# Patient Record
Sex: MALE | Race: BLACK OR AFRICAN AMERICAN | Employment: OTHER | URBAN - METROPOLITAN AREA
[De-identification: names, ages, dates, MRNs, and addresses within clinical notes are randomized per-mention and may not be internally consistent; named-entity substitution may affect disease eponyms.]

---

## 2017-02-01 DIAGNOSIS — R29.898 WEAKNESS OF BOTH LEGS: ICD-10-CM

## 2017-02-01 DIAGNOSIS — G89.29 OTHER CHRONIC PAIN: ICD-10-CM

## 2017-02-01 RX ORDER — HYDROCODONE BITARTRATE AND ACETAMINOPHEN 10; 325 MG/1; MG/1
1 TABLET ORAL
Qty: 90 TAB | Refills: 0 | Status: SHIPPED | OUTPATIENT
Start: 2017-02-01 | End: 2017-02-06 | Stop reason: SDUPTHER

## 2017-02-01 NOTE — TELEPHONE ENCOUNTER
Last Visit: 12/13/2016 with MD Cody Escudero    Next Appointment: 03/14/2017 with MD Cody Escudero   Previous Refill Encounters: 12/13/2016 per MD Cody Escudero #[phone]     Requested Prescriptions     Pending Prescriptions Disp Refills    HYDROcodone-acetaminophen (NORCO)  mg tablet 90 Tab 0     Sig: Take 1 Tab by mouth three (3) times daily as needed for Pain. Max Daily Amount: 3 Tabs.  Take for chronic pain

## 2017-02-06 RX ORDER — HYDROCODONE BITARTRATE AND ACETAMINOPHEN 10; 325 MG/1; MG/1
1 TABLET ORAL
Qty: 90 TAB | Refills: 0 | Status: SHIPPED | OUTPATIENT
Start: 2017-02-06 | End: 2017-03-14 | Stop reason: SDUPTHER

## 2017-02-06 NOTE — TELEPHONE ENCOUNTER
Rx for Norco 10/325 mg # 90 i tid prn pain signed and patient was notified ready for pickup at [facility]

## 2017-03-14 ENCOUNTER — OFFICE VISIT (OUTPATIENT)
Dept: ORTHOPEDIC SURGERY | Age: 72
End: 2017-03-14

## 2017-03-14 VITALS
WEIGHT: 223 LBS | SYSTOLIC BLOOD PRESSURE: 127 MMHG | TEMPERATURE: 98 F | HEART RATE: 76 BPM | RESPIRATION RATE: 18 BRPM | OXYGEN SATURATION: 96 % | BODY MASS INDEX: 33.8 KG/M2 | HEIGHT: 68 IN | DIASTOLIC BLOOD PRESSURE: 71 MMHG

## 2017-03-14 DIAGNOSIS — M48.061 LUMBAR SPINAL STENOSIS: ICD-10-CM

## 2017-03-14 DIAGNOSIS — R29.898 WEAKNESS OF BOTH LEGS: ICD-10-CM

## 2017-03-14 DIAGNOSIS — G89.29 OTHER CHRONIC PAIN: ICD-10-CM

## 2017-03-14 DIAGNOSIS — M47.816 LUMBAR FACET ARTHROPATHY: ICD-10-CM

## 2017-03-14 DIAGNOSIS — H54.8 LEGALLY BLIND: ICD-10-CM

## 2017-03-14 DIAGNOSIS — Z51.81 THERAPEUTIC DRUG MONITORING: ICD-10-CM

## 2017-03-14 DIAGNOSIS — K59.00 CONSTIPATION, UNSPECIFIED CONSTIPATION TYPE: ICD-10-CM

## 2017-03-14 DIAGNOSIS — G89.29 OTHER CHRONIC PAIN: Primary | ICD-10-CM

## 2017-03-14 RX ORDER — HYDROCODONE BITARTRATE AND ACETAMINOPHEN 10; 325 MG/1; MG/1
TABLET ORAL
Qty: 90 TAB | Refills: 0 | Status: SHIPPED | OUTPATIENT
Start: 2017-05-12 | End: 2017-06-13 | Stop reason: SDUPTHER

## 2017-03-14 RX ORDER — FLUTICASONE PROPIONATE 50 MCG
2 SPRAY, SUSPENSION (ML) NASAL
COMMUNITY

## 2017-03-14 RX ORDER — HYDROCODONE BITARTRATE AND ACETAMINOPHEN 10; 325 MG/1; MG/1
1 TABLET ORAL
Qty: 90 TAB | Refills: 0 | Status: SHIPPED | OUTPATIENT
Start: 2017-03-14 | End: 2017-06-13 | Stop reason: SDUPTHER

## 2017-03-14 RX ORDER — POLYETHYLENE GLYCOL 3350 17 G/17G
17 POWDER, FOR SOLUTION ORAL
Qty: 510 G | Refills: 5 | Status: SHIPPED | OUTPATIENT
Start: 2017-03-14 | End: 2018-03-12 | Stop reason: SDUPTHER

## 2017-03-14 RX ORDER — HYDROCODONE BITARTRATE AND ACETAMINOPHEN 10; 325 MG/1; MG/1
TABLET ORAL
Qty: 90 TAB | Refills: 0 | Status: SHIPPED | OUTPATIENT
Start: 2017-04-13 | End: 2017-06-13 | Stop reason: SDUPTHER

## 2017-03-14 NOTE — PATIENT INSTRUCTIONS
Low Back Arthritis: Exercises  Your Care Instructions  Here are some examples of typical rehabilitation exercises for your condition. Start each exercise slowly. Ease off the exercise if you start to have pain. Your doctor or physical therapist will tell you when you can start these exercises and which ones will work best for you. When you are not being active, find a comfortable position for rest. Some people are comfortable on the floor or a medium-firm bed with a small pillow under their head and another under their knees. Some people prefer to lie on their side with a pillow between their knees. Don't stay in one position for too long. Take short walks (10 to 20 minutes) every 2 to 3 hours. Avoid slopes, hills, and stairs until you feel better. Walk only distances you can manage without pain, especially leg pain. How to do the exercises  Pelvic tilt    1. Lie on your back with your knees bent. 2. \"Brace\" your stomachtighten your muscles by pulling in and imagining your belly button moving toward your spine. 3. Press your lower back into the floor. You should feel your hips and pelvis rock back. 4. Hold for 6 seconds while breathing smoothly. 5. Relax and allow your pelvis and hips to rock forward. 6. Repeat 8 to 12 times. Back stretches    1. Get down on your hands and knees on the floor. 2. Relax your head and allow it to droop. Round your back up toward the ceiling until you feel a nice stretch in your upper, middle, and lower back. Hold this stretch for as long as it feels comfortable, or about 15 to 30 seconds. 3. Return to the starting position with a flat back while you are on your hands and knees. 4. Let your back sway by pressing your stomach toward the floor. Lift your buttocks toward the ceiling. 5. Hold this position for 15 to 30 seconds. 6. Repeat 2 to 4 times. Follow-up care is a key part of your treatment and safety.  Be sure to make and go to all appointments, and call your doctor if you are having problems. It's also a good idea to know your test results and keep a list of the medicines you take. Where can you learn more? Go to http://slava-todd.info/. Enter W158 in the search box to learn more about \"Low Back Arthritis: Exercises. \"  Current as of: May 23, 2016  Content Version: 11.1  © 8699-5010 [employer]. Care instructions adapted under license by [employer] (which disclaims liability or warranty for this information). If you have questions about a medical condition or this instruction, always ask your healthcare professional. [place] [address] any warranty or liability for your use of this information.

## 2017-03-14 NOTE — MR AVS SNAPSHOT
Visit Information Date & Time Provider Department Dept. Phone Encounter #  
 3/14/2017 11:30 AM Choco Kidd, [address] [facility] [phone] Follow-up Instructions Return in about 3 months (around 6/14/2017). Routing History Upcoming Health Maintenance Date Due DTaP/Tdap/Td series (1 - Tdap) 11/7/1964 FOBT Q 1 YEAR AGE 50-75 11/7/1993 ZOSTER VACCINE AGE 60> 11/7/2003 GLAUCOMA SCREENING Q2Y 11/7/2008 MEDICARE YEARLY EXAM 11/7/2008 INFLUENZA AGE 9 TO ADULT 8/1/2016 Pneumococcal 65+ High/Highest Risk (2 of 2 - PPSV23) 1/27/2017 Allergies as of 3/14/2017  Review Complete On: 3/14/2017 By: Vandana Cooney LPN Severity Noted Reaction Type Reactions Contrast Agent [Iodine]  01/01/1995    Hives Current Immunizations  Never Reviewed No immunizations on file. Not reviewed this visit You Were Diagnosed With   
  
 Codes Comments Other chronic pain    -  Primary ICD-10-CM: G89.29 ICD-9-CM: 338.29 Therapeutic drug monitoring     ICD-10-CM: Z51.81 
ICD-9-CM: V58.83 Weakness of both legs     ICD-10-CM: M62.81 ICD-9-CM: 729.89 Lumbar spinal stenosis     ICD-10-CM: M48.06 
ICD-9-CM: 724.02 Lumbar facet arthropathy (HCC)     ICD-10-CM: M12.88 ICD-9-CM: 721.3 Legally blind     ICD-10-CM: H54.8 ICD-9-CM: 369.4 Constipation, unspecified constipation type     ICD-10-CM: K59.00 ICD-9-CM: 564.00 Vitals BP Pulse Temp Resp Height(growth percentile) Weight(growth percentile) 127/71 76 98 °F (36.7 °C) (Oral) 18 5' 8\" (1.727 m) 223 lb (101.2 kg) SpO2 BMI Smoking Status 96% 33.91 kg/m2 Never Smoker BMI and BSA Data Body Mass Index Body Surface Area  
 33.91 kg/m 2 2.2 m 2 Preferred Pharmacy Pharmacy Name Phone [address], [facility] [place] [phone] Your Updated Medication List  
  
   
This list is accurate as of: 3/14/17 12:27 PM.  Always use your most recent med list.  
  
  
  
  
 acyclovir 400 mg tablet Commonly known as:  ZOVIRAX ADALAT CC PO Take 60 mg by mouth daily. ALLOPURINOL PO Take 300 mg by mouth daily. aspirin delayed-release 81 mg tablet Take  by mouth daily. CELEXA PO Take 10 mg by mouth daily. DULoxetine 30 mg capsule Commonly known as:  CYMBALTA FLONASE 50 mcg/actuation nasal spray Generic drug:  fluticasone 2 Sprays by Both Nostrils route daily. * HYDROcodone-acetaminophen  mg tablet Commonly known as:  Ericka Willis Take 1 Tab by mouth three (3) times daily as needed for Pain. Max Daily Amount: 3 Tabs. Take for chronic pain  
  
 * HYDROcodone-acetaminophen  mg tablet Commonly known as:  Ericka Willis Take 1 TID as needed for chronic pain  Indications: Pain Start taking on:  4/13/2017  
  
 * HYDROcodone-acetaminophen  mg tablet Commonly known as:  Ericka Willis 1 po TID as needed for chronic pain  Indications: Pain Start taking on:  5/12/2017  
  
 oxyCODONE-acetaminophen 7.5-325 mg per tablet Commonly known as:  PERCOCET 7.5  
1 po q pm prn severe pain  
  
 polyethylene glycol 17 gram/dose powder Commonly known as:  [place] Armor Take 17 g by mouth daily as needed. PROAIR HFA 90 mcg/actuation inhaler Generic drug:  albuterol SPIRONOLACTONE PO Take 25 mg by mouth two (2) times a day. TOPAMAX PO Take 100 mg by mouth two (2) times a day. Indications: TREMORS * Notice: This list has 3 medication(s) that are the same as other medications prescribed for you. Read the directions carefully, and ask your doctor or other care provider to review them with you. Prescriptions Printed Refills HYDROcodone-acetaminophen (NORCO)  mg tablet 0 Sig: Take 1 Tab by mouth three (3) times daily as needed for Pain.  Max Daily Amount: 3 Tabs. Take for chronic pain  
 Class: Print Route: Oral  
 HYDROcodone-acetaminophen (NORCO)  mg tablet 0 Starting on: 2017 Si po TID as needed for chronic pain  Indications: Pain Class: Print HYDROcodone-acetaminophen (NORCO)  mg tablet 0 Starting on: 2017 Sig: Take 1 TID as needed for chronic pain  Indications: Pain Class: Print Prescriptions Sent to Pharmacy Refills  
 polyethylene glycol (MIRALAX) 17 gram/dose powder 5 Sig: Take 17 g by mouth daily as needed. Class: Normal  
 Pharmacy: [facility] [address] [place] [address] #: [phone] Route: Oral  
  
Follow-up Instructions Return in about 3 months (around 2017). To-Do List   
 2017 Lab:  DRUG SCREEN UR - W/ CONFIRM Patient Instructions Low Back Arthritis: Exercises Your Care Instructions Here are some examples of typical rehabilitation exercises for your condition. Start each exercise slowly. Ease off the exercise if you start to have pain. Your doctor or physical therapist will tell you when you can start these exercises and which ones will work best for you. When you are not being active, find a comfortable position for rest. Some people are comfortable on the floor or a medium-firm bed with a small pillow under their head and another under their knees. Some people prefer to lie on their side with a pillow between their knees. Don't stay in one position for too long. Take short walks (10 to 20 minutes) every 2 to 3 hours. Avoid slopes, hills, and stairs until you feel better. Walk only distances you can manage without pain, especially leg pain. How to do the exercises Pelvic tilt 1. Lie on your back with your knees bent. 2. \"Brace\" your stomachtighten your muscles by pulling in and imagining your belly button moving toward your spine. 3. Press your lower back into the floor. You should feel your hips and pelvis rock back. 4. Hold for 6 seconds while breathing smoothly. 5. Relax and allow your pelvis and hips to rock forward. 6. Repeat 8 to 12 times. Back stretches 1. Get down on your hands and knees on the floor. 2. Relax your head and allow it to droop. Round your back up toward the ceiling until you feel a nice stretch in your upper, middle, and lower back. Hold this stretch for as long as it feels comfortable, or about 15 to 30 seconds. 3. Return to the starting position with a flat back while you are on your hands and knees. 4. Let your back sway by pressing your stomach toward the floor. Lift your buttocks toward the ceiling. 5. Hold this position for 15 to 30 seconds. 6. Repeat 2 to 4 times. Follow-up care is a key part of your treatment and safety. Be sure to make and go to all appointments, and call your doctor if you are having problems. It's also a good idea to know your test results and keep a list of the medicines you take. Where can you learn more? Go to http://slava-todd.info/. Enter C562 in the search box to learn more about \"Low Back Arthritis: Exercises. \" Current as of: May 23, 2016 Content Version: 11.1 © 4578-3013 [employer]. Care instructions adapted under license by [employer] (which disclaims liability or warranty for this information). If you have questions about a medical condition or this instruction, always ask your healthcare professional. [place] [address] any warranty or liability for your use of this information. Introducing [facility]! Juan Manuel Hillman introduces iSites patient portal. Now you can access parts of your medical record, email your doctor's office, and request medication refills online. 1. In your internet browser, go to [email or web address] 2. Click on the First Time User? Click Here link in the Sign In box. You will see the New Member Sign Up page. 3. Enter your [employer] Access Code exactly as it appears below. You will not need to use this code after youve completed the sign-up process. If you do not sign up before the expiration date, you must request a new code. · [employer] Access Code: [email or web address] Expires: 6/12/2017 11:31 AM 
 
4. Enter the last four digits of your Social Security Number (xxxx) and Date of Birth (mm/dd/yyyy) as indicated and click Submit. You will be taken to the next sign-up page. 5. Create a Fashion To Figure ID. This will be your [employer] login ID and cannot be changed, so think of one that is secure and easy to remember. 6. Create a [employer] password. You can change your password at any time. 7. Enter your Password Reset Question and Answer. This can be used at a later time if you forget your password. 8. Enter your e-mail address. You will receive e-mail notification when new information is available in [address] 9. Click Sign Up. You can now view and download portions of your medical record. 10. Click the Download Summary menu link to download a portable copy of your medical information. If you have questions, please visit the Frequently Asked Questions section of the Fashion To [employer] website. Remember, Fashion To Figure is NOT to be used for urgent needs. For medical emergencies, dial 911. Now available from your iPhone and Android! Please provide this summary of care documentation to your next provider. Your primary care clinician is listed as Mendel Sprain. If you have any questions after today's visit, please call [phone].

## 2017-03-14 NOTE — PROGRESS NOTES
[facility]  [address] [place], [address]  Phone: [phone]  Fax: [phone]          HISTORY OF PRESENT ILLNESS:  Charlette Caldwell is a 66 y.o. male with history of chronic lumbar pain. Pt reports 7/10 lower back pain today. He states that he ran out of Norco  mg since his last office visit. Pt generally takes the Norco  mg as his pain warrants with benefit. At one point he had discontinued the medication because of the constipation but has gotten significant relief with Miralax. He requests a refill of Miralax at this time. He also recently had his service connection adjusted at the [place]; he reports it is 60% now and he will be receiving an increase in his monthly benefits. He thought he had 70% service connection and he will contact his [place] representative about this. If he has further questions, he will follow up with Dr. Koffi Gastelum at the [place]; his next scheduled appt is in August of 2017. Pt at this time desires to continue with current care. Of note, his wife is having significant health issues which are currently pulmonary and she is no longer able to drive him to his appts. She is currently undergoing testing and is on oxygen.      Pain Scale: 7/10    PCP: Randal Blanc MD       Past Medical History:   Diagnosis Date    Allergy     Anxiety     ED (erectile dysfunction)     Elevated prostate specific antigen (PSA)     Essential hypertension     Essential tremor     Gout     Hypotestosteronism     Ill-defined condition     ESSENTIAL TREMORS    Nocturia     Nodular prostate without urinary obstruction     Panic attack     Prostate cancer ([facility] [address] 2/14/13    PNBx-T2a - Prostatic Volume: 26.7 grams - [place] 6 (3 + 3) -  Dr. Simona Sanchez Thyroid disease     small tumor        Social History     Social History    Marital status:      Spouse name: N/A    Number of children: N/A    Years of education: N/A     Occupational History    Not on file. Social History Main Topics    Smoking status: Never Smoker    Smokeless tobacco: Never Used    Alcohol use Yes      Comment: seldom    Drug use: No    Sexual activity: Not on file     Other Topics Concern    Not on file     Social History Narrative       Current Outpatient Prescriptions   Medication Sig Dispense Refill    fluticasone (FLONASE) 50 mcg/actuation nasal spray 2 Sprays by Both Nostrils route daily.  HYDROcodone-acetaminophen (NORCO)  mg tablet Take 1 Tab by mouth three (3) times daily as needed for Pain. Max Daily Amount: 3 Tabs. Take for chronic pain 90 Tab 0    polyethylene glycol (MIRALAX) 17 gram/dose powder Take 17 g by mouth daily as needed. 510 g 5    [START ON 5/12/2017] HYDROcodone-acetaminophen (NORCO)  mg tablet 1 po TID as needed for chronic pain  Indications: Pain 90 Tab 0    [START ON 4/13/2017] HYDROcodone-acetaminophen (NORCO)  mg tablet Take 1 TID as needed for chronic pain  Indications: Pain 90 Tab 0    DULoxetine (CYMBALTA) 30 mg capsule       aspirin delayed-release 81 mg tablet Take  by mouth daily.  NIFEDIPINE (ADALAT CC PO) Take 60 mg by mouth daily.  SPIRONOLACTONE PO Take 25 mg by mouth two (2) times a day.  ALLOPURINOL PO Take 300 mg by mouth daily.  CITALOPRAM HYDROBROMIDE (CELEXA PO) Take 10 mg by mouth daily.  TOPIRAMATE (TOPAMAX PO) Take 100 mg by mouth two (2) times a day. Indications: TREMORS      oxyCODONE-acetaminophen (PERCOCET 7.5) 7.5-325 mg per tablet 1 po q pm prn severe pain 30 Tab 0    PROAIR HFA 90 mcg/actuation inhaler       acyclovir (ZOVIRAX) 400 mg tablet   3       Allergies   Allergen Reactions    Contrast Agent [Iodine] Hives         REVIEW OF SYSTEMS    Constitutional: Negative for fever, chills, or weight change. Respiratory: Negative for cough or shortness of breath.      Cardiovascular: Negative for chest pain or palpitations. Gastrointestinal: Positive for constipation. Negative for acid reflux or change in bowel habits. Genitourinary: Negative for dysuria and flank pain. Musculoskeletal: Positive for lumbar pain. Skin: Negative for rash. Neurological: Negative for headaches, dizziness, or numbness. Endo/Heme/Allergies: Negative for increased bruising. Psychiatric/Behavioral: Negative for difficulty with sleep. PHYSICAL EXAMINATION  Visit Vitals    /71    Pulse 76    Temp 98 °F (36.7 °C) (Oral)    Resp 18    Ht 5' 8\" (1.727 m)    Wt 223 lb (101.2 kg)    SpO2 96%    BMI 33.91 kg/m2       Constitutional: Awake, alert, and in no acute distress; pt requires assistance with ambulation due to his visual impairment  Neurological: 1+ symmetrical DTRs in the lower extremities. Sensation to light touch is intact. Skin: warm, dry, and intact. Musculoskeletal: Moderate pain with extension and axial loading. No pain with internal or external rotation of his hips. Negative straight leg raise bilaterally. Pt ambulates with the assistance of a single point cane. Hip Flex  Quads Hamstrings Ankle DF EHL Ankle PF   Right  4/5  4/5  4/5  4/5  4/5  4/5   Left  4/5  4/5  4/5  4/5  4/5  4/5     IMAGING:    Lumbar Spine MRI from 5/12/2015 was personally reviewed with the Pt and demonstrated:  IMPRESSION:  1. Partially evaluated complex left renal lesions. Recommend further evaluation with an ultrasound and/or contrast enhanced multiphase CT. 2. Multilevel degenerative disc disease and facet arthropathy with scoliotic deformity. This cause various degrees of spinal canal and neuroforaminal narrowing.    3. There is mild lumbar dextroscoliosis. Vertebral body height and alignment are normal. There is moderate disc space narrowing and osteophytosis from L1-L2 through L5-S1. There is facet arthropathy in the lower lumbar spine. Prostatic seed implants are noted.     [facility] was seen today for back pain and medication refill. Diagnoses and all orders for this visit:    Other chronic pain  -     HYDROcodone-acetaminophen (NORCO)  mg tablet; Take 1 Tab by mouth three (3) times daily as needed for Pain. Max Daily Amount: 3 Tabs. Take for chronic pain  -     DRUG SCREEN UR - W/ CONFIRM; Future  -     HYDROcodone-acetaminophen (NORCO)  mg tablet; 1 po TID as needed for chronic pain  Indications: Pain  -     HYDROcodone-acetaminophen (NORCO)  mg tablet; Take 1 TID as needed for chronic pain  Indications: Pain    Therapeutic drug monitoring  -     HYDROcodone-acetaminophen (NORCO)  mg tablet; 1 po TID as needed for chronic pain  Indications: Pain  -     HYDROcodone-acetaminophen (NORCO)  mg tablet; Take 1 TID as needed for chronic pain  Indications: Pain    Weakness of both legs    Lumbar spinal stenosis    Lumbar facet arthropathy (HCC)    Legally blind    Constipation, unspecified constipation type  -     polyethylene glycol (MIRALAX) 17 gram/dose powder; Take 17 g by mouth daily as needed. IMPRESSION AND PLAN:  Roosevelt Barr is a 66 y.o. male with history of chronic lumbar pain. Pt reports 7/10 lower back pain today. He states that he ran out of Norco  mg since his last office visit and generally takes it as his pain warrants with benefit. 1) Pt was given information on lumbar arthritis exercises. 2) He received a refill of Norco  mg 1 tab TID prn pain. 3) A UDS was obtained. 4) Pt received a refill of Miralax 17 gram daily. 5) Mr. Issac Bronson has a reminder for a \"due or due soon\" health maintenance. I have asked that he contact his primary care provider, Dereje Lloyd MD, for follow-up on this health maintenance. 6)  reviewed. 7) Pt will follow-up in 3 months.       Written by Chon Shields, as dictated by Shruti [place] MD.

## 2017-04-10 DIAGNOSIS — C61 MALIGNANT NEOPLASM OF PROSTATE (HCC): ICD-10-CM

## 2017-04-21 ENCOUNTER — OFFICE VISIT (OUTPATIENT)
Dept: SURGERY | Age: 72
End: 2017-04-21

## 2017-04-21 VITALS
DIASTOLIC BLOOD PRESSURE: 70 MMHG | RESPIRATION RATE: 19 BRPM | WEIGHT: 223 LBS | HEIGHT: 68 IN | OXYGEN SATURATION: 97 % | SYSTOLIC BLOOD PRESSURE: 118 MMHG | BODY MASS INDEX: 33.8 KG/M2

## 2017-04-21 DIAGNOSIS — R10.31 RIGHT LOWER QUADRANT ABDOMINAL PAIN: Primary | ICD-10-CM

## 2017-04-21 PROBLEM — R10.9 ABDOMINAL PAIN: Status: ACTIVE | Noted: 2017-04-21

## 2017-04-21 NOTE — PROGRESS NOTES
Progress Note    Patient: Carlito Jarrell  MRN: [id number]  SSN: [id number]   YOB: 1945  Age: 66 y.o. Sex: male     Chief Complaint   Patient presents with   Estela Horton        HPI    Mr. Shayla Roger is a 64-year-old gentleman who is significantly debilitated who has a history of prostate cancer and blindness. He is here for abdominal wall hernia on the right lower quadrant. He has no surgical wound at that site but it is possible that this could be a Spegailian hernia. He tells me does not bother him very often and is never cause any significant clinical issue. He has a nonpalpable hernia at this point today. We have talked in detail about the possibilities but overall I think he is safe and does not require an immediate operation. Past Medical History:   Diagnosis Date    Allergy     Anxiety     ED (erectile dysfunction)     Elevated prostate specific antigen (PSA)     Essential hypertension     Essential tremor     Gout     Hypotestosteronism     Ill-defined condition     ESSENTIAL TREMORS    Legally blind     Nocturia     Nodular prostate without urinary obstruction     Panic attack     Prostate cancer ([facility] [address] 2/14/13    PNBx-T2a - Prostatic Volume: 26.7 grams - Jeffery 6 (3 + 3) -  Dr. Skelton [facility] Thyroid disease     small tumor     Past Surgical History:   Procedure Laterality Date    HX APPENDECTOMY      HX BACK SURGERY  1982    HX CARPAL TUNNEL RELEASE      HX CATARACT REMOVAL      HX HERNIA REPAIR      umbilical     Allergies   Allergen Reactions    Contrast Agent [Iodine] Hives     Current Outpatient Prescriptions   Medication Sig Dispense Refill    fluticasone (FLONASE) 50 mcg/actuation nasal spray 2 Sprays by Both Nostrils route daily.  HYDROcodone-acetaminophen (NORCO)  mg tablet Take 1 Tab by mouth three (3) times daily as needed for Pain. Max Daily Amount: 3 Tabs.  Take for chronic pain 90 Tab 0    polyethylene glycol (MIRALAX) 17 gram/dose powder Take 17 g by mouth daily as needed. 510 g 5    [START ON 5/12/2017] HYDROcodone-acetaminophen (NORCO)  mg tablet 1 po TID as needed for chronic pain  Indications: Pain 90 Tab 0    HYDROcodone-acetaminophen (NORCO)  mg tablet Take 1 TID as needed for chronic pain  Indications: Pain 90 Tab 0    oxyCODONE-acetaminophen (PERCOCET 7.5) 7.5-325 mg per tablet 1 po q pm prn severe pain 30 Tab 0    PROAIR HFA 90 mcg/actuation inhaler       DULoxetine (CYMBALTA) 30 mg capsule       acyclovir (ZOVIRAX) 400 mg tablet   3    aspirin delayed-release 81 mg tablet Take  by mouth daily.  NIFEDIPINE (ADALAT CC PO) Take 60 mg by mouth daily.  SPIRONOLACTONE PO Take 25 mg by mouth two (2) times a day.  ALLOPURINOL PO Take 300 mg by mouth daily.  CITALOPRAM HYDROBROMIDE (CELEXA PO) Take 10 mg by mouth daily.  TOPIRAMATE (TOPAMAX PO) Take 100 mg by mouth two (2) times a day. Indications: TREMORS       Social History     Social History    Marital status:      Spouse name: N/A    Number of children: N/A    Years of education: N/A     Occupational History    Not on file.      Social History Main Topics    Smoking status: Never Smoker    Smokeless tobacco: Never Used    Alcohol use Yes      Comment: seldom    Drug use: No    Sexual activity: Not on file     Other Topics Concern    Not on file     Social History Narrative     Family History   Problem Relation Age of Onset    Cancer Sister          Review of systems:  Patient denies any reflux, emesis, abdominal pain, change in bowel habits, hematochezia, melena, fever, weight loss, fatigue chills, dermatitis, abnormal moles, change in vision, vertigo, epistaxis, dysphagia, hoarseness, chest pain, palpitations, hypertension, edema, cough, shortness of breath, wheezing, hemoptysis, snoring, hematuria, diabetes, thyroid disease, anemia, bruising, history of blood transfusion, dizziness, headache, or fainting. Physical Examination    Well developed well nourished male in no apparent distress  Visit Vitals    /70 (BP 1 Location: Left arm, BP Patient Position: Sitting)    Resp 19    Ht 5' 8\" (1.727 m)    Wt 101.2 kg (223 lb)    SpO2 97%    BMI 33.91 kg/m2      Head: normocephalic, atraumatic  Mouth: Clear, no overt lesions, oral mucosa pink and moist  Neck: supple, no masses, no adenopathy or carotid bruits, trachea midline  Resp: clear to auscultation bilaterally, no wheeze, rhonchi or rales, excursions normal and symmetrical  Cardio: Regular rate and rhythm, no murmurs, clicks, gallops or rubs, no edema or varicosities  Abdomen: soft, nontender, nondistended, normoactive bowel sounds, no hernias, no hepatosplenomegaly,   Back: Deferred  Extremeties: warm, well-perfused, no tenderness or swelling, normal gait/station  Neuro: sensation and strength grossly intact and symmetrical  Psych: alert and oriented to person, place and time  Breast exam deferred    IMPRESSION  Possible abdominal wall hernia which is minimally symptomatic    PLAN  No orders of the defined types were placed in this encounter.     Follow-up as needed  Carli Hernandez MD

## 2017-04-21 NOTE — MR AVS SNAPSHOT
Visit Information Date & Time Provider Department Dept. Phone Encounter #  
 4/21/2017 10:30 AM Mayank Elkins MD [facility] [phone] [id number] Your Appointments 6/13/2017 10:45 AM  
Follow Up with Diamond Schuster MD  
[facility] Appt Note: 3 MO F/U LOW BACK  
 [address] [facility] [address]  
[phone]  
  
   
 [address] [facility] [address] Upcoming Health Maintenance Date Due DTaP/Tdap/Td series (1 - Tdap) 11/7/1964 FOBT Q 1 YEAR AGE 50-75 11/7/1993 ZOSTER VACCINE AGE 60> 11/7/2003 GLAUCOMA SCREENING Q2Y 11/7/2008 MEDICARE YEARLY EXAM 11/7/2008 INFLUENZA AGE 9 TO ADULT 8/1/2016 Pneumococcal 65+ High/Highest Risk (2 of 2 - PPSV23) 1/27/2017 Allergies as of 4/21/2017  Review Complete On: 4/21/2017 By: Ravi Santos LPN Severity Noted Reaction Type Reactions Contrast Agent [Iodine]  01/01/1995    Hives Current Immunizations  Never Reviewed No immunizations on file. Not reviewed this visit Vitals BP Resp Height(growth percentile) Weight(growth percentile) SpO2 BMI  
 118/70 (BP 1 Location: Left arm, BP Patient Position: Sitting) 19 5' 8\" (1.727 m) 223 lb (101.2 kg) 97% 33.91 kg/m2 Smoking Status Never Smoker Vitals History BMI and BSA Data Body Mass Index Body Surface Area  
 33.91 kg/m 2 2.2 m 2 Preferred Pharmacy Pharmacy Name Phone [address] [place] [address], [address] [place] [address] Road [phone] Your Updated Medication List  
  
   
This list is accurate as of: 4/21/17 10:40 AM.  Always use your most recent med list.  
  
  
  
  
 acyclovir 400 mg tablet Commonly known as:  ZOVIRAX ADALAT CC PO Take 60 mg by mouth daily. ALLOPURINOL PO Take 300 mg by mouth daily. aspirin delayed-release 81 mg tablet Take  by mouth daily. CELEXA PO Take 10 mg by mouth daily. DULoxetine 30 mg capsule Commonly known as:  CYMBALTA FLONASE 50 mcg/actuation nasal spray Generic drug:  fluticasone 2 Sprays by Both Nostrils route daily. * HYDROcodone-acetaminophen  mg tablet Commonly known as:  [address] Take 1 Tab by mouth three (3) times daily as needed for Pain. Max Daily Amount: 3 Tabs. Take for chronic pain  
  
 * HYDROcodone-acetaminophen  mg tablet Commonly known as:  [address] Take 1 TID as needed for chronic pain  Indications: Pain  
  
 * HYDROcodone-acetaminophen  mg tablet Commonly known as:  [address] 1 po TID as needed for chronic pain  Indications: Pain Start taking on:  5/12/2017  
  
 oxyCODONE-acetaminophen 7.5-325 mg per tablet Commonly known as:  PERCOCET 7.5  
1 po q pm prn severe pain  
  
 polyethylene glycol 17 gram/dose powder Commonly known as:  Aida Faribault Take 17 g by mouth daily as needed. PROAIR HFA 90 mcg/actuation inhaler Generic drug:  albuterol SPIRONOLACTONE PO Take 25 mg by mouth two (2) times a day. TOPAMAX PO Take 100 mg by mouth two (2) times a day. Indications: TREMORS * Notice: This list has 3 medication(s) that are the same as other medications prescribed for you. Read the directions carefully, and ask your doctor or other care provider to review them with you. Patient Instructions Call the office at [phone]  if you have any questions or concerns. Introducing [facility]! Tanis Epley introduces [employer] patient portal. Now you can access parts of your medical record, email your doctor's office, and request medication refills online. 1. In your internet browser, go to [email or web address] 2. Click on the First Time User? Click Here link in the Sign In box. You will see the New Member Sign Up page. 3. Enter your Daily [employer] Access Code exactly as it appears below. You will not need to use this code after youve completed the sign-up process. If you do not sign up before the expiration date, you must request a new code. · [employer] Access Code: [email or web address] Expires: 6/12/2017 11:31 AM 
 
4. Enter the last four digits of your Social Security Number (xxxx) and Date of Birth (mm/dd/yyyy) as indicated and click Submit. You will be taken to the next sign-up page. 5. Create a Daily [employer] ID. This will be your Daily [employer] login ID and cannot be changed, so think of one that is secure and easy to remember. 6. Create a [employer] password. You can change your password at any time. 7. Enter your Password Reset Question and Answer. This can be used at a later time if you forget your password. 8. Enter your e-mail address. You will receive e-mail notification when new information is available in [address] 9. Click Sign Up. You can now view and download portions of your medical record. 10. Click the Download Summary menu link to download a portable copy of your medical information. If you have questions, please visit the Frequently Asked Questions section of the [employer] website. Remember, [employer] is NOT to be used for urgent needs. For medical emergencies, dial 911. Now available from your iPhone and Android! Please provide this summary of care documentation to your next provider. Your primary care clinician is listed as Raji Rivera. If you have any questions after today's visit, please call [phone].

## 2017-05-06 ENCOUNTER — HOSPITAL ENCOUNTER (OUTPATIENT)
Dept: LAB | Age: 72
Discharge: HOME | End: 2017-05-06
Payer: MEDICARE

## 2017-05-06 DIAGNOSIS — N40.0 BENIGN PROSTATIC HYPERTROPHY: ICD-10-CM

## 2017-05-06 LAB
ANION GAP BLD CALC-SCNC: 8 MMOL/L (ref 3–18)
BUN SERPL-MCNC: 17 MG/DL (ref 7–18)
BUN/CREAT SERPL: 13 (ref 12–20)
CALCIUM SERPL-MCNC: 9.1 MG/DL (ref 8.5–10.1)
CHLORIDE SERPL-SCNC: 107 MMOL/L (ref 100–108)
CO2 SERPL-SCNC: 26 MMOL/L (ref 21–32)
CREAT SERPL-MCNC: 1.29 MG/DL (ref 0.6–1.3)
GLUCOSE SERPL-MCNC: 103 MG/DL (ref 74–99)
POTASSIUM SERPL-SCNC: 4 MMOL/L (ref 3.5–5.5)
SODIUM SERPL-SCNC: 141 MMOL/L (ref 136–145)

## 2017-05-06 PROCEDURE — 84154 ASSAY OF PSA FREE: CPT | Performed by: RADIOLOGY

## 2017-05-06 PROCEDURE — 84403 ASSAY OF TOTAL TESTOSTERONE: CPT | Performed by: RADIOLOGY

## 2017-05-07 LAB
COMMENT, TESC2: ABNORMAL
PSA FREE MFR SERPL: 10 %
PSA FREE SERPL-MCNC: 0.08 NG/ML
PSA SERPL-MCNC: 0.8 NG/ML (ref 0–4)
TESTOST SERPL-MCNC: 259 NG/DL (ref 348–1197)

## 2017-05-10 ENCOUNTER — HOSPITAL ENCOUNTER (OUTPATIENT)
Dept: RADIATION THERAPY | Age: 72
Discharge: HOME | End: 2017-05-10
Payer: MEDICARE

## 2017-05-10 PROCEDURE — 99211 OFF/OP EST MAY X REQ PHY/QHP: CPT

## 2017-06-13 ENCOUNTER — OFFICE VISIT (OUTPATIENT)
Dept: ORTHOPEDIC SURGERY | Age: 72
End: 2017-06-13

## 2017-06-13 VITALS
OXYGEN SATURATION: 97 % | TEMPERATURE: 98 F | HEART RATE: 72 BPM | BODY MASS INDEX: 33.34 KG/M2 | SYSTOLIC BLOOD PRESSURE: 132 MMHG | DIASTOLIC BLOOD PRESSURE: 69 MMHG | RESPIRATION RATE: 18 BRPM | WEIGHT: 220 LBS | HEIGHT: 68 IN

## 2017-06-13 DIAGNOSIS — H54.8 LEGALLY BLIND: ICD-10-CM

## 2017-06-13 DIAGNOSIS — R26.9 GAIT ABNORMALITY: ICD-10-CM

## 2017-06-13 DIAGNOSIS — G89.29 OTHER CHRONIC PAIN: Primary | ICD-10-CM

## 2017-06-13 DIAGNOSIS — M48.061 LUMBAR SPINAL STENOSIS: ICD-10-CM

## 2017-06-13 DIAGNOSIS — Z51.81 THERAPEUTIC DRUG MONITORING: ICD-10-CM

## 2017-06-13 DIAGNOSIS — K59.00 CONSTIPATION, UNSPECIFIED CONSTIPATION TYPE: ICD-10-CM

## 2017-06-13 DIAGNOSIS — M47.816 LUMBAR FACET ARTHROPATHY: ICD-10-CM

## 2017-06-13 RX ORDER — INDOMETHACIN 25 MG/1
CAPSULE ORAL
COMMUNITY
Start: 2013-12-17 | End: 2017-12-01

## 2017-06-13 RX ORDER — HYDROCODONE BITARTRATE AND ACETAMINOPHEN 10; 325 MG/1; MG/1
1 TABLET ORAL
Qty: 90 TAB | Refills: 0 | Status: SHIPPED | OUTPATIENT
Start: 2017-06-13 | End: 2017-09-12 | Stop reason: SDUPTHER

## 2017-06-13 RX ORDER — LORAZEPAM 0.5 MG/1
TABLET ORAL
COMMUNITY
Start: 2013-08-20 | End: 2017-12-01

## 2017-06-13 RX ORDER — BUTENAFINE HYDROCHLORIDE 10 MG/G
CREAM TOPICAL AS NEEDED
COMMUNITY
Start: 2009-02-12

## 2017-06-13 RX ORDER — HYDROCODONE BITARTRATE AND ACETAMINOPHEN 10; 325 MG/1; MG/1
1 TABLET ORAL
Qty: 90 TAB | Refills: 0 | Status: SHIPPED | OUTPATIENT
Start: 2017-07-13 | End: 2017-09-12 | Stop reason: SDUPTHER

## 2017-06-13 RX ORDER — HYDROCODONE BITARTRATE AND ACETAMINOPHEN 10; 325 MG/1; MG/1
1 TABLET ORAL
Qty: 90 TAB | Refills: 0 | Status: SHIPPED | OUTPATIENT
Start: 2017-08-12 | End: 2017-09-12 | Stop reason: SDUPTHER

## 2017-06-13 RX ORDER — HYDROCORTISONE 25 MG/G
CREAM TOPICAL
COMMUNITY
Start: 2007-07-05 | End: 2018-03-12 | Stop reason: SDUPTHER

## 2017-06-13 RX ORDER — ACETAMINOPHEN 325 MG/1
TABLET ORAL
COMMUNITY
End: 2017-12-01

## 2017-06-13 NOTE — MR AVS SNAPSHOT
Visit Information Date & Time Provider Department Dept. Phone Encounter #  
 6/13/2017 10:45 AM Mey Sterling MD [facility] [phone] [id number] Follow-up Instructions Return in about 3 months (around 9/13/2017) for Medication follow up. Upcoming Health Maintenance Date Due DTaP/Tdap/Td series (1 - Tdap) 11/7/1964 FOBT Q 1 YEAR AGE 50-75 11/7/1993 ZOSTER VACCINE AGE 60> 11/7/2003 GLAUCOMA SCREENING Q2Y 11/7/2008 MEDICARE YEARLY EXAM 11/7/2008 Pneumococcal 65+ High/Highest Risk (2 of 2 - PPSV23) 1/27/2017 INFLUENZA AGE 9 TO ADULT 8/1/2017 Allergies as of 6/13/2017  Review Complete On: 6/13/2017 By: Kyara Laird LPN Severity Noted Reaction Type Reactions Contrast Agent [Iodine]  01/01/1995    Hives Current Immunizations  Never Reviewed No immunizations on file. Not reviewed this visit You Were Diagnosed With   
  
 Codes Comments Other chronic pain    -  Primary ICD-10-CM: G89.29 ICD-9-CM: 338.29 Therapeutic drug monitoring     ICD-10-CM: Z51.81 
ICD-9-CM: V58.83 Lumbar spinal stenosis     ICD-10-CM: M48.06 
ICD-9-CM: 724.02 Lumbar facet arthropathy (HCC)     ICD-10-CM: M12.88 ICD-9-CM: 721.3 Gait abnormality     ICD-10-CM: R26.9 ICD-9-CM: 382. 2 Legally blind     ICD-10-CM: H54.8 ICD-9-CM: 369.4 Constipation, unspecified constipation type     ICD-10-CM: K59.00 ICD-9-CM: 564.00 Vitals BP Pulse Temp Resp Height(growth percentile) Weight(growth percentile) 132/69 72 98 °F (36.7 °C) (Oral) 18 5' 8\" (1.727 m) 220 lb (99.8 kg) SpO2 BMI Smoking Status 97% 33.45 kg/m2 Never Smoker Vitals History BMI and BSA Data Body Mass Index Body Surface Area  
 33.45 kg/m 2 2.19 m 2 Preferred Pharmacy Pharmacy Name Phone [address] [phone] Your Updated Medication List  
  
   
This list is accurate as of: 6/13/17 12:11 PM.  Always use your most recent med list.  
  
  
  
  
 acetaminophen 325 mg tablet Commonly known as:  TYLENOL Take  by mouth. acyclovir 400 mg tablet Commonly known as:  ZOVIRAX ADALAT CC PO Take 60 mg by mouth daily. ALLOPURINOL PO Take 300 mg by mouth daily. aspirin delayed-release 81 mg tablet Take  by mouth daily. DULoxetine 30 mg capsule Commonly known as:  CYMBALTA Take 30 mg by mouth daily. FLONASE 50 mcg/actuation nasal spray Generic drug:  fluticasone 2 Sprays by Both Nostrils route daily. * HYDROcodone-acetaminophen  mg tablet Commonly known as:  Maryfrances Grumbles Take 1 Tab by mouth three (3) times daily as needed for Pain. Max Daily Amount: 3 Tabs. Indications: Chronic Pain  
  
 * HYDROcodone-acetaminophen  mg tablet Commonly known as:  Maryfrances Grumbles Take 1 Tab by mouth three (3) times daily as needed for Pain. Max Daily Amount: 3 Tabs. Indications: Chronic Pain Start taking on:  7/13/2017  
  
 * HYDROcodone-acetaminophen  mg tablet Commonly known as:  Maryfrances Grumbles Take 1 Tab by mouth three (3) times daily as needed for Pain. Max Daily Amount: 3 Tabs. Indications: Chronic Pain Start taking on:  8/12/2017  
  
 hydrocortisone 2.5 % rectal cream  
Commonly known as:  ANUSOL-HC Insert  into rectum. indomethacin 25 mg capsule Commonly known as:  INDOCIN Take  by mouth. LORazepam 0.5 mg tablet Commonly known as:  ATIVAN Take  by mouth. MENTAX 1 % topical cream  
Generic drug:  butenafine Mentax 1 % External Cream APPLY SPARINGLY TO AFFECTED AREA(S) ONCE DAILY  Quantity: 60    Matt Amato M.D.;  Started 12-Feb-2009 Active  
  
 polyethylene glycol 17 gram/dose powder Commonly known as:  Gwenyth Space Take 17 g by mouth daily as needed. SPIRONOLACTONE PO Take 25 mg by mouth two (2) times a day.   
  
 TOPAMAX PO  
 Take 100 mg by mouth two (2) times a day. Indications: TREMORS * Notice: This list has 3 medication(s) that are the same as other medications prescribed for you. Read the directions carefully, and ask your doctor or other care provider to review them with you. Prescriptions Printed Refills HYDROcodone-acetaminophen (NORCO)  mg tablet 0 Sig: Take 1 Tab by mouth three (3) times daily as needed for Pain. Max Daily Amount: 3 Tabs. Indications: Chronic Pain Class: Print Route: Oral  
 HYDROcodone-acetaminophen (NORCO)  mg tablet 0 Starting on: 7/13/2017 Sig: Take 1 Tab by mouth three (3) times daily as needed for Pain. Max Daily Amount: 3 Tabs. Indications: Chronic Pain Class: Print Route: Oral  
 HYDROcodone-acetaminophen (NORCO)  mg tablet 0 Starting on: 8/12/2017 Sig: Take 1 Tab by mouth three (3) times daily as needed for Pain. Max Daily Amount: 3 Tabs. Indications: Chronic Pain Class: Print Route: Oral  
  
Follow-up Instructions Return in about 3 months (around 9/13/2017) for Medication follow up. Please provide this summary of care documentation to your next provider. Your primary care clinician is listed as Eda Coombs. If you have any questions after today's visit, please call [phone].

## 2017-06-13 NOTE — PROGRESS NOTES
[facility]  [address] [place] [address], [facility] [address]  Phone: [phone]  Fax: [phone]      Laurence Kim was seen today for back pain. Diagnoses and all orders for this visit:    Other chronic pain  -     HYDROcodone-acetaminophen (NORCO)  mg tablet; Take 1 Tab by mouth three (3) times daily as needed for Pain. Max Daily Amount: 3 Tabs. Indications: Chronic Pain  -     HYDROcodone-acetaminophen (NORCO)  mg tablet; Take 1 Tab by mouth three (3) times daily as needed for Pain. Max Daily Amount: 3 Tabs. Indications: Chronic Pain  -     HYDROcodone-acetaminophen (NORCO)  mg tablet; Take 1 Tab by mouth three (3) times daily as needed for Pain. Max Daily Amount: 3 Tabs. Indications: Chronic Pain    Therapeutic drug monitoring  -     HYDROcodone-acetaminophen (NORCO)  mg tablet; Take 1 Tab by mouth three (3) times daily as needed for Pain. Max Daily Amount: 3 Tabs. Indications: Chronic Pain  -     HYDROcodone-acetaminophen (NORCO)  mg tablet; Take 1 Tab by mouth three (3) times daily as needed for Pain. Max Daily Amount: 3 Tabs. Indications: Chronic Pain    Lumbar spinal stenosis    Lumbar facet arthropathy (HCC)    Gait abnormality    Legally blind    Constipation, unspecified constipation type         IMPRESSION AND PLAN:  Georgette Patterson is a 66 y.o. male with history of chronic lumbar pain who comes in today for medication refills. He will continue on current medication regimen. 1) Continue on current medications. 4) Mr. Annie Elliott has a reminder for a \"due or due soon\" health maintenance. I have asked that he contact his primary care provider, Yuki Christiansen MD, for follow-up on this health maintenance. 5)  demonstrated consistency with prescribing. 6) Last UDS from 03/24/17 was consistent. 7) Pt will follow-up in 3 months.       HISTORY OF PRESENT ILLNESS:  Georgette Patterson is a 66 y.o. male with history of chronic lumbar pain who comes in today for medication refills. He complains of pain in his buttocks left more than right. It is getting worse but is using cane now after using walker previously. He cannot walk long distances and he feels like his legs will give out if he does not sit down after he stands for a while. He complains that the Norco helps but it makes him sleepy. He denies history of renal problems. Pt needs refill of prescription for Miralax which does help with constipation. Pain Scale: 7/10    PCP: Sarah Laura MD       Past Medical History:   Diagnosis Date    Allergy     Anxiety     ED (erectile dysfunction)     Elevated prostate specific antigen (PSA)     Essential hypertension     Essential tremor     Gout     Hypotestosteronism     Ill-defined condition     ESSENTIAL TREMORS    Legally blind     Nocturia     Nodular prostate without urinary obstruction     Panic attack     Prostate cancer ([facility] [address] 2/14/13    PNBx-T2a - Prostatic Volume: 26.7 grams - Jeffery 6 (3 + 3) -  Dr. Aly Due Thyroid disease     small tumor        Social History     Social History    Marital status:      Spouse name: N/A    Number of children: N/A    Years of education: N/A     Occupational History    Not on file. Social History Main Topics    Smoking status: Never Smoker    Smokeless tobacco: Never Used    Alcohol use Yes      Comment: seldom    Drug use: No    Sexual activity: Not on file     Other Topics Concern    Not on file     Social History Narrative       Current Outpatient Prescriptions   Medication Sig Dispense Refill    hydrocortisone (ANUSOL-HC) 2.5 % rectal cream Insert  into rectum.  acetaminophen (TYLENOL) 325 mg tablet Take  by mouth.       butenafine (MENTAX) 1 % topical cream Mentax 1 % External Cream  APPLY SPARINGLY TO AFFECTED AREA(S) ONCE DAILY   Quantity: 60       Sun Bhat M.D.;  Started 12-Feb-2009  Active      HYDROcodone-acetaminophen (NORCO)  mg tablet Take 1 Tab by mouth three (3) times daily as needed for Pain. Max Daily Amount: 3 Tabs. Indications: Chronic Pain 90 Tab 0    [START ON 7/13/2017] HYDROcodone-acetaminophen (NORCO)  mg tablet Take 1 Tab by mouth three (3) times daily as needed for Pain. Max Daily Amount: 3 Tabs. Indications: Chronic Pain 90 Tab 0    [START ON 8/12/2017] HYDROcodone-acetaminophen (NORCO)  mg tablet Take 1 Tab by mouth three (3) times daily as needed for Pain. Max Daily Amount: 3 Tabs. Indications: Chronic Pain 90 Tab 0    fluticasone (FLONASE) 50 mcg/actuation nasal spray 2 Sprays by Both Nostrils route daily.  polyethylene glycol (MIRALAX) 17 gram/dose powder Take 17 g by mouth daily as needed. 510 g 5    DULoxetine (CYMBALTA) 30 mg capsule Take 30 mg by mouth daily.  aspirin delayed-release 81 mg tablet Take  by mouth daily.  NIFEDIPINE (ADALAT CC PO) Take 60 mg by mouth daily.  SPIRONOLACTONE PO Take 25 mg by mouth two (2) times a day.  ALLOPURINOL PO Take 300 mg by mouth daily.  TOPIRAMATE (TOPAMAX PO) Take 100 mg by mouth two (2) times a day. Indications: TREMORS      indomethacin (INDOCIN) 25 mg capsule Take  by mouth.  LORazepam (ATIVAN) 0.5 mg tablet Take  by mouth.  acyclovir (ZOVIRAX) 400 mg tablet   3       Allergies   Allergen Reactions    Contrast Agent [Iodine] Hives         REVIEW OF SYSTEMS    Constitutional: Negative for fever, chills, or weight change. Respiratory: Negative for cough or shortness of breath. Cardiovascular: Negative for chest pain or palpitations. Gastrointestinal: Negative for acid reflux, change in bowel habits, or constipation. Genitourinary: Negative for dysuria and flank pain. Musculoskeletal: Positive for lumbar/sacral pain. Skin: Negative for rash. Neurological: Negative for headaches, dizziness, or numbness. Endo/Heme/Allergies: Negative for increased bruising. Psychiatric/Behavioral: Negative for difficulty with sleep. PHYSICAL EXAMINATION  Visit Vitals    /69    Pulse 72    Temp 98 °F (36.7 °C) (Oral)    Resp 18    Ht 5' 8\" (1.727 m)    Wt 220 lb (99.8 kg)    SpO2 97%    BMI 33.45 kg/m2       Constitutional: Awake, alert, and in no acute distress; Pt is visually impaired  Eyes: Visually impaired. Neurological: 1+ symmetrical DTRs in the upper extremities. 1+ symmetrical DTRs in the lower extremities. Sensation to light touch is intact. Negative Alexey's sign bilaterally. Skin: warm, dry, and intact. Musculoskeletal: Pain at L4/L5 and L5/S1. Mild-moderate pain with extension, axial loading, less pain with forward flexion. No pain with internal or external rotation of his hips. Negative straight leg raise bilaterally; difficulty transitioning sit to stand        Quads   Right +4/5   Left  4/5     IMAGING:       Lumbar Spine MRI from 5/12/2015 was personally reviewed with the Pt and demonstrated:  IMPRESSION:  1. Partially evaluated complex left renal lesions. Recommend further evaluation with an ultrasound and/or contrast enhanced multiphase CT. 2. Multilevel degenerative disc disease and facet arthropathy with scoliotic deformity. This cause various degrees of spinal canal and neuroforaminal narrowing.    3. There is mild lumbar dextroscoliosis. Vertebral body height and alignment are normal. There is moderate disc space narrowing and osteophytosis from L1-L2 through L5-S1. There is facet arthropathy in the lower lumbar spine. Prostatic seed implants are noted. Written by Corrinne Card, as dictated by Nunu Rosas MD.  I, Dr. Nunu Rosas confirm that all documentation is accurate.

## 2017-09-12 ENCOUNTER — OFFICE VISIT (OUTPATIENT)
Dept: ORTHOPEDIC SURGERY | Age: 72
End: 2017-09-12

## 2017-09-12 VITALS
SYSTOLIC BLOOD PRESSURE: 128 MMHG | HEART RATE: 88 BPM | BODY MASS INDEX: 32.58 KG/M2 | HEIGHT: 68 IN | WEIGHT: 215 LBS | DIASTOLIC BLOOD PRESSURE: 77 MMHG

## 2017-09-12 DIAGNOSIS — Z79.891 USE OF OPIATES FOR THERAPEUTIC PURPOSES: ICD-10-CM

## 2017-09-12 DIAGNOSIS — R29.898 WEAKNESS OF BOTH LEGS: ICD-10-CM

## 2017-09-12 DIAGNOSIS — R26.9 GAIT ABNORMALITY: ICD-10-CM

## 2017-09-12 DIAGNOSIS — Z86.19 HISTORY OF SHINGLES: ICD-10-CM

## 2017-09-12 DIAGNOSIS — M47.816 LUMBAR FACET ARTHROPATHY: ICD-10-CM

## 2017-09-12 DIAGNOSIS — M48.061 LUMBAR SPINAL STENOSIS: ICD-10-CM

## 2017-09-12 DIAGNOSIS — G89.29 OTHER CHRONIC PAIN: ICD-10-CM

## 2017-09-12 DIAGNOSIS — H54.8 LEGALLY BLIND: ICD-10-CM

## 2017-09-12 DIAGNOSIS — G89.29 OTHER CHRONIC PAIN: Primary | ICD-10-CM

## 2017-09-12 RX ORDER — HYDROCODONE BITARTRATE AND ACETAMINOPHEN 10; 325 MG/1; MG/1
1 TABLET ORAL
Qty: 90 TAB | Refills: 0 | Status: SHIPPED | OUTPATIENT
Start: 2017-10-12 | End: 2017-12-01

## 2017-09-12 RX ORDER — HYDROCODONE BITARTRATE AND ACETAMINOPHEN 10; 325 MG/1; MG/1
1 TABLET ORAL
Qty: 90 TAB | Refills: 0 | Status: SHIPPED | OUTPATIENT
Start: 2017-11-11 | End: 2017-12-01

## 2017-09-12 RX ORDER — HYDROCODONE BITARTRATE AND ACETAMINOPHEN 10; 325 MG/1; MG/1
1 TABLET ORAL
Qty: 90 TAB | Refills: 0 | Status: SHIPPED | OUTPATIENT
Start: 2017-09-12 | End: 2017-12-12 | Stop reason: SDUPTHER

## 2017-09-12 NOTE — MR AVS SNAPSHOT
Visit Information Date & Time Provider Department Dept. Phone Encounter #  
 9/12/2017  1:15 PM Cecilia Harding MD 4 [facility], [address] [facility] [phone] [id number] Follow-up Instructions Return in about 3 months (around 12/12/2017) for Medication follow up. Upcoming Health Maintenance Date Due DTaP/Tdap/Td series (1 - Tdap) 11/7/1964 FOBT Q 1 YEAR AGE 50-75 11/7/1993 ZOSTER VACCINE AGE 60> 9/7/2003 GLAUCOMA SCREENING Q2Y 11/7/2008 MEDICARE YEARLY EXAM 11/7/2008 Pneumococcal 65+ High/Highest Risk (2 of 2 - PPSV23) 1/27/2017 INFLUENZA AGE 9 TO ADULT 8/1/2017 Allergies as of 9/12/2017  Review Complete On: 9/12/2017 By: Temitope Jesus Severity Noted Reaction Type Reactions Contrast Agent [Iodine]  01/01/1995    Hives Current Immunizations  Never Reviewed No immunizations on file. Not reviewed this visit You Were Diagnosed With   
  
 Codes Comments Other chronic pain    -  Primary ICD-10-CM: G89.29 ICD-9-CM: 338.29 Use of opiates for therapeutic purposes     ICD-10-CM: Z79.891 ICD-9-CM: V58.69 Lumbar facet arthropathy     ICD-10-CM: M12.88 ICD-9-CM: 721.3 Lumbar spinal stenosis     ICD-10-CM: M48.06 
ICD-9-CM: 724.02 Gait abnormality     ICD-10-CM: R26.9 ICD-9-CM: 336. 2 Weakness of both legs     ICD-10-CM: R29.898 ICD-9-CM: 729.89 Legally blind     ICD-10-CM: H54.8 ICD-9-CM: 369.4 Therapeutic drug monitoring     ICD-10-CM: Z51.81 
ICD-9-CM: V58.83 Vitals BP Pulse Height(growth percentile) Weight(growth percentile) BMI Smoking Status 128/77 88 5' 8\" (1.727 m) 215 lb (97.5 kg) 32.69 kg/m2 Never Smoker Vitals History BMI and BSA Data Body Mass Index Body Surface Area  
 32.69 kg/m 2 2.16 m 2 Preferred Pharmacy Pharmacy Name Phone [address], [address] [phone] Your Updated Medication List  
  
   
This list is accurate as of: 9/12/17  2:23 PM.  Always use your most recent med list.  
  
  
  
  
 acetaminophen 325 mg tablet Commonly known as:  TYLENOL Take  by mouth. acyclovir 400 mg tablet Commonly known as:  ZOVIRAX ADALAT CC PO Take 60 mg by mouth daily. ALLOPURINOL PO Take 300 mg by mouth daily. aspirin delayed-release 81 mg tablet Take  by mouth daily. DULoxetine 30 mg capsule Commonly known as:  CYMBALTA Take 30 mg by mouth daily. FLONASE 50 mcg/actuation nasal spray Generic drug:  fluticasone 2 Sprays by Both Nostrils route daily. * HYDROcodone-acetaminophen  mg tablet Commonly known as:  Candy Quintanilla Take 1 Tab by mouth three (3) times daily as needed for Pain. Max Daily Amount: 3 Tabs. Indications: Chronic Pain  
  
 * HYDROcodone-acetaminophen  mg tablet Commonly known as:  Candy Angieyland Take 1 Tab by mouth three (3) times daily as needed for Pain. Max Daily Amount: 3 Tabs. Indications: Chronic Pain Start taking on:  10/12/2017  
  
 * HYDROcodone-acetaminophen  mg tablet Commonly known as:  Candy Adamsyland Take 1 Tab by mouth three (3) times daily as needed for Pain. Max Daily Amount: 3 Tabs. Indications: Chronic Pain Start taking on:  11/11/2017  
  
 hydrocortisone 2.5 % rectal cream  
Commonly known as:  ANUSOL-HC Insert  into rectum. indomethacin 25 mg capsule Commonly known as:  INDOCIN Take  by mouth. LORazepam 0.5 mg tablet Commonly known as:  ATIVAN Take  by mouth. MENTAX 1 % topical cream  
Generic drug:  butenafine Mentax 1 % External Cream APPLY SPARINGLY TO AFFECTED AREA(S) ONCE DAILY  Quantity: 60    Emanuel Quintero M.D.;  Started 12-Feb-2009 Active  
  
 polyethylene glycol 17 gram/dose powder Commonly known as:  Claudeen Cast Take 17 g by mouth daily as needed. SPIRONOLACTONE PO Take 25 mg by mouth two (2) times a day.   
  
 TOPAMAX PO  
 Take 100 mg by mouth two (2) times a day. Indications: TREMORS * Notice: This list has 3 medication(s) that are the same as other medications prescribed for you. Read the directions carefully, and ask your doctor or other care provider to review them with you. Prescriptions Printed Refills HYDROcodone-acetaminophen (NORCO)  mg tablet 0 Sig: Take 1 Tab by mouth three (3) times daily as needed for Pain. Max Daily Amount: 3 Tabs. Indications: Chronic Pain Class: Print Route: Oral  
 HYDROcodone-acetaminophen (NORCO)  mg tablet 0 Starting on: 10/12/2017 Sig: Take 1 Tab by mouth three (3) times daily as needed for Pain. Max Daily Amount: 3 Tabs. Indications: Chronic Pain Class: Print Route: Oral  
 HYDROcodone-acetaminophen (NORCO)  mg tablet 0 Starting on: 11/11/2017 Sig: Take 1 Tab by mouth three (3) times daily as needed for Pain. Max Daily Amount: 3 Tabs. Indications: Chronic Pain Class: Print Route: Oral  
  
Follow-up Instructions Return in about 3 months (around 12/12/2017) for Medication follow up. To-Do List   
 09/12/2017 Lab:  DRUG SCREEN UR - W/ CONFIRM Patient Instructions Low Back Arthritis: Exercises Your Care Instructions Here are some examples of typical rehabilitation exercises for your condition. Start each exercise slowly. Ease off the exercise if you start to have pain. Your doctor or physical therapist will tell you when you can start these exercises and which ones will work best for you. When you are not being active, find a comfortable position for rest. Some people are comfortable on the floor or a medium-firm bed with a small pillow under their head and another under their knees. Some people prefer to lie on their side with a pillow between their knees. Don't stay in one position for too long. Take short walks (10 to 20 minutes) every 2 to 3 hours.  Avoid slopes, hills, and stairs until you feel better. Walk only distances you can manage without pain, especially leg pain. How to do the exercises Pelvic tilt 1. Lie on your back with your knees bent. 2. \"Brace\" your stomachtighten your muscles by pulling in and imagining your belly button moving toward your spine. 3. Press your lower back into the floor. You should feel your hips and pelvis rock back. 4. Hold for 6 seconds while breathing smoothly. 5. Relax and allow your pelvis and hips to rock forward. 6. Repeat 8 to 12 times. Back stretches 1. Get down on your hands and knees on the floor. 2. Relax your head and allow it to droop. Round your back up toward the ceiling until you feel a nice stretch in your upper, middle, and lower back. Hold this stretch for as long as it feels comfortable, or about 15 to 30 seconds. 3. Return to the starting position with a flat back while you are on your hands and knees. 4. Let your back sway by pressing your stomach toward the floor. Lift your buttocks toward the ceiling. 5. Hold this position for 15 to 30 seconds. 6. Repeat 2 to 4 times. Follow-up care is a key part of your treatment and safety. Be sure to make and go to all appointments, and call your doctor if you are having problems. It's also a good idea to know your test results and keep a list of the medicines you take. Where can you learn more? Go to http://slava-todd.info/. Enter S414 in the search box to learn more about \"Low Back Arthritis: Exercises. \" Current as of: March 21, 2017 Content Version: 11.3 © 0685-2705 [employer]. Care instructions adapted under license by [employer] (which disclaims liability or warranty for this information). If you have questions about a medical condition or this instruction, always ask your healthcare professional. [address] any warranty or liability for your use of this information. Please provide this summary of care documentation to your next provider. Your primary care clinician is listed as Justin Billings. If you have any questions after today's visit, please call [phone].

## 2017-09-12 NOTE — PATIENT INSTRUCTIONS

## 2017-09-12 NOTE — PROGRESS NOTES
[facility]  [address] [place] [address], [facility] [address]  Phone: [phone]  Fax: [phone]      ASSESSMENT   Diagnoses and all orders for this visit:    1. Other chronic pain  -     DRUG SCREEN UR - W/ CONFIRM; Future  -     HYDROcodone-acetaminophen (NORCO)  mg tablet; Take 1 Tab by mouth three (3) times daily as needed for Pain. Max Daily Amount: 3 Tabs. Indications: Chronic Pain  -     HYDROcodone-acetaminophen (NORCO)  mg tablet; Take 1 Tab by mouth three (3) times daily as needed for Pain. Max Daily Amount: 3 Tabs. Indications: Chronic Pain  -     HYDROcodone-acetaminophen (NORCO)  mg tablet; Take 1 Tab by mouth three (3) times daily as needed for Pain. Max Daily Amount: 3 Tabs. Indications: Chronic Pain    2. Use of opiates for therapeutic purposes  -     DRUG SCREEN UR - W/ CONFIRM; Future  -     HYDROcodone-acetaminophen (NORCO)  mg tablet; Take 1 Tab by mouth three (3) times daily as needed for Pain. Max Daily Amount: 3 Tabs. Indications: Chronic Pain  -     HYDROcodone-acetaminophen (NORCO)  mg tablet; Take 1 Tab by mouth three (3) times daily as needed for Pain. Max Daily Amount: 3 Tabs. Indications: Chronic Pain  -     HYDROcodone-acetaminophen (NORCO)  mg tablet; Take 1 Tab by mouth three (3) times daily as needed for Pain. Max Daily Amount: 3 Tabs. Indications: Chronic Pain    3. Lumbar facet arthropathy    4. Lumbar spinal stenosis    5. Gait abnormality    6. Weakness of both legs    7. Legally blind    8. History of shingles         IMPRESSION AND PLAN:  Taz Marquez is a 66 y.o. male with history of chronic lumbar pain. He denies any change in his lower back pain since his last office visit. Pt continues to take Norco  mg 1 tab TID and admits to relief in his shingles symptoms when taking the medication. 1) Pt was given information on lumbar arthritis exercises.    2) He received a refill of Norco  mg 1 tab TID prn pain. 3) A UDS/oral swab was obtained. 4) Mr. Fayrene Dakins has a reminder for a \"due or due soon\" health maintenance. I have asked that he contact his primary care provider, Syd Spring MD, for follow-up on this health maintenance. 5)  demonstrated consistency with prescribing. 6) Last UDS from 03/14/2017 was consistent. 7) Pt will follow-up in 3 months. HISTORY OF PRESENT ILLNESS:  Sandy Stevens is a 66 y.o. male with history of chronic lumbar pain. Pt denies any change in his lower back pain since his last office visit. He admits that he occasionally feels unstable on his feet and generally ambulates with the assistance of a rolling walker. Pt has been prescribed a wheelchair but reports that is cumbersome to use. He continues to take Norco  mg TID with benefit. Pt denies any constipation with the Norco and reports that he takes Miralax as needed. He states that he had shingles in 06/2017 in the right buttock and right thigh that has since healed. Pt reports relief in his shingles symptoms when taking the Norco. He admits that he continues to experience intermittent pain in the right leg and buttock but reports that this is no longer as severe. Pt at this time desires to continue with current care.     Pain Scale: 6/10    PCP: Syd Spring MD       Past Medical History:   Diagnosis Date    Allergy     Anxiety     ED (erectile dysfunction)     Elevated prostate specific antigen (PSA)     Essential hypertension     Essential tremor     Gout     Hypotestosteronism     Ill-defined condition     ESSENTIAL TREMORS    Legally blind     Nocturia     Nodular prostate without urinary obstruction     Panic attack     Prostate cancer ([facility] [address] 2/14/13    PNBx-T2a - Prostatic Volume: 26.7 grams - [place] 6 (3 + 3) -  Dr. Indira Nichols Thyroid disease     small tumor        Social History     Social History    Marital status:      Spouse name: N/A  Number of children: N/A    Years of education: N/A     Occupational History    Not on file. Social History Main Topics    Smoking status: Never Smoker    Smokeless tobacco: Never Used    Alcohol use Yes      Comment: seldom    Drug use: No    Sexual activity: Not on file     Other Topics Concern    Not on file     Social History Narrative       Current Outpatient Prescriptions   Medication Sig Dispense Refill    HYDROcodone-acetaminophen (NORCO)  mg tablet Take 1 Tab by mouth three (3) times daily as needed for Pain. Max Daily Amount: 3 Tabs. Indications: Chronic Pain 90 Tab 0    [START ON 10/12/2017] HYDROcodone-acetaminophen (NORCO)  mg tablet Take 1 Tab by mouth three (3) times daily as needed for Pain. Max Daily Amount: 3 Tabs. Indications: Chronic Pain 90 Tab 0    [START ON 11/11/2017] HYDROcodone-acetaminophen (NORCO)  mg tablet Take 1 Tab by mouth three (3) times daily as needed for Pain. Max Daily Amount: 3 Tabs. Indications: Chronic Pain 90 Tab 0    indomethacin (INDOCIN) 25 mg capsule Take  by mouth.  acetaminophen (TYLENOL) 325 mg tablet Take  by mouth.  fluticasone (FLONASE) 50 mcg/actuation nasal spray 2 Sprays by Both Nostrils route daily.  polyethylene glycol (MIRALAX) 17 gram/dose powder Take 17 g by mouth daily as needed. 510 g 5    DULoxetine (CYMBALTA) 30 mg capsule Take 30 mg by mouth daily.  acyclovir (ZOVIRAX) 400 mg tablet   3    aspirin delayed-release 81 mg tablet Take  by mouth daily.  NIFEDIPINE (ADALAT CC PO) Take 60 mg by mouth daily.  SPIRONOLACTONE PO Take 25 mg by mouth two (2) times a day.  ALLOPURINOL PO Take 300 mg by mouth daily.  TOPIRAMATE (TOPAMAX PO) Take 100 mg by mouth two (2) times a day. Indications: TREMORS      hydrocortisone (ANUSOL-HC) 2.5 % rectal cream Insert  into rectum.  LORazepam (ATIVAN) 0.5 mg tablet Take  by mouth.       butenafine (MENTAX) 1 % topical cream Mentax 1 % External Cream  APPLY SPARINGLY TO AFFECTED AREA(S) ONCE DAILY   Quantity: 60       Rell [place] M.D.;  Started 12-Feb-2009  Active         Allergies   Allergen Reactions    Contrast Agent [Iodine] Hives         REVIEW OF SYSTEMS    Constitutional: Negative for fever, chills, or weight change. Respiratory: Negative for cough or shortness of breath. Cardiovascular: Negative for chest pain or palpitations. Gastrointestinal: Negative for acid reflux, change in bowel habits, or constipation. Genitourinary: Negative for dysuria and flank pain. Musculoskeletal: Positive for lumbar pain. Skin: Negative for rash. Neurological: Negative for headaches, dizziness, or numbness. Endo/Heme/Allergies: Negative for increased bruising. Psychiatric/Behavioral: Negative for difficulty with sleep. PHYSICAL EXAMINATION  Visit Vitals    /77    Pulse 88    Ht 5' 8\" (1.727 m)    Wt 215 lb (97.5 kg)    BMI 32.69 kg/m2       Constitutional: Awake, alert, and in no acute distress  Neurological: 1+ symmetrical DTRs in the lower extremities. Sensation to light touch is intact. Skin: warm, dry, and intact. Musculoskeletal: Tenderness to palpation in the lower lumbar region. Moderate pain with extension and axial loading. No pain with internal or external rotation of his hips. Negative straight leg raise bilaterally. Pt ambulates with the assistance of a rolling walker. Hip Flex  Quads Hamstrings Ankle DF EHL Ankle PF   Right -4/5 -4/5 -4/5 +4/5 +4/5 +4/5   Left -4/5 -4/5 -4/5 +4/5 +4/5 +4/5     IMAGING:    Lumbar Spine MRI from 5/12/2015 was personally reviewed with the Pt and demonstrated:  IMPRESSION:  1. Partially evaluated complex left renal lesions. Recommend further evaluation with an ultrasound and/or contrast enhanced multiphase CT. 2. Multilevel degenerative disc disease and facet arthropathy with scoliotic deformity.  This cause various degrees of spinal canal and neuroforaminal narrowing.    3. There is mild lumbar dextroscoliosis. Vertebral body height and alignment are normal. There is moderate disc space narrowing and osteophytosis from L1-L2 through L5-S1. There is facet arthropathy in the lower lumbar spine. Prostatic seed implants are noted. Written by Eim Lobo, as dictated by Sharron Love MD.  I, Dr. Sharron Love confirm that all documentation is accurate.

## 2017-11-20 ENCOUNTER — HOSPITAL ENCOUNTER (OUTPATIENT)
Dept: LAB | Age: 72
Discharge: HOME | End: 2017-11-20
Payer: MEDICARE

## 2017-11-20 DIAGNOSIS — C61 PROSTATE CANCER (HCC): ICD-10-CM

## 2017-11-20 LAB — PSA SERPL-MCNC: 0.7 NG/ML (ref 0–4)

## 2017-11-20 PROCEDURE — 84153 ASSAY OF PSA TOTAL: CPT | Performed by: UROLOGY

## 2017-11-20 PROCEDURE — 36415 COLL VENOUS BLD VENIPUNCTURE: CPT | Performed by: UROLOGY

## 2017-12-01 NOTE — PROGRESS NOTES
Per 12/1/2017 notes, Dr. Josselin Floyd and patient reviewed PSA. Patient is to follow up in 1 year.

## 2017-12-12 ENCOUNTER — OFFICE VISIT (OUTPATIENT)
Dept: ORTHOPEDIC SURGERY | Age: 72
End: 2017-12-12

## 2017-12-12 VITALS
BODY MASS INDEX: 30.35 KG/M2 | WEIGHT: 212 LBS | HEART RATE: 63 BPM | SYSTOLIC BLOOD PRESSURE: 118 MMHG | HEIGHT: 70 IN | DIASTOLIC BLOOD PRESSURE: 61 MMHG | TEMPERATURE: 98.1 F | RESPIRATION RATE: 14 BRPM

## 2017-12-12 DIAGNOSIS — Z79.891 USE OF OPIATES FOR THERAPEUTIC PURPOSES: ICD-10-CM

## 2017-12-12 DIAGNOSIS — M47.816 LUMBAR FACET ARTHROPATHY: ICD-10-CM

## 2017-12-12 DIAGNOSIS — G89.29 OTHER CHRONIC PAIN: Primary | ICD-10-CM

## 2017-12-12 DIAGNOSIS — G89.29 OTHER CHRONIC PAIN: ICD-10-CM

## 2017-12-12 DIAGNOSIS — M48.062 SPINAL STENOSIS OF LUMBAR REGION WITH NEUROGENIC CLAUDICATION: ICD-10-CM

## 2017-12-12 RX ORDER — HYDROCODONE BITARTRATE AND ACETAMINOPHEN 10; 325 MG/1; MG/1
1 TABLET ORAL
Qty: 90 TAB | Refills: 0 | Status: SHIPPED | OUTPATIENT
Start: 2018-01-11 | End: 2018-03-12 | Stop reason: SDUPTHER

## 2017-12-12 RX ORDER — HYDROCODONE BITARTRATE AND ACETAMINOPHEN 10; 325 MG/1; MG/1
1 TABLET ORAL
Qty: 90 TAB | Refills: 0 | Status: SHIPPED | OUTPATIENT
Start: 2017-12-12 | End: 2018-03-12 | Stop reason: SDUPTHER

## 2017-12-12 RX ORDER — HYDROCODONE BITARTRATE AND ACETAMINOPHEN 10; 325 MG/1; MG/1
1 TABLET ORAL
Qty: 90 TAB | Refills: 0 | Status: SHIPPED | OUTPATIENT
Start: 2018-02-10 | End: 2018-06-11 | Stop reason: SDUPTHER

## 2017-12-12 NOTE — MR AVS SNAPSHOT
Visit Information Date & Time Provider Department Dept. Phone Encounter #  
 12/12/2017  1:45 PM Fabián Santizo MD [facility] [phone] [id number] Follow-up Instructions Return in about 3 months (around 3/12/2018) for Medication follow up.  
  
 12/4/2018  9:30 AM  
Any with Fabrice Neves MD  
[facility]) Appt Note: Return in about 1 year (around 12/1/2018) for Prostate CA, PSA prior. [address] [address]  
[phone]  
  
   
 [place] [id number] Upcoming Health Maintenance Date Due DTaP/Tdap/Td series (1 - Tdap) 11/7/1964 FOBT Q 1 YEAR AGE 50-75 11/7/1993 ZOSTER VACCINE AGE 60> 9/7/2003 GLAUCOMA SCREENING Q2Y 11/7/2008 MEDICARE YEARLY EXAM 11/7/2008 Pneumococcal 65+ High/Highest Risk (2 of 2 - PPSV23) 1/27/2017 Influenza Age 5 to Adult 8/1/2017 Allergies as of 12/12/2017  Review Complete On: 12/12/2017 By: Fabián Santizo MD  
  
 Severity Noted Reaction Type Reactions Contrast Agent [Iodine]  01/01/1995    Hives Current Immunizations  Never Reviewed No immunizations on file. Not reviewed this visit You Were Diagnosed With   
  
 Codes Comments Other chronic pain    -  Primary ICD-10-CM: G89.29 ICD-9-CM: 338.29 Use of opiates for therapeutic purposes     ICD-10-CM: Z79.891 ICD-9-CM: V58.69 Spinal stenosis of lumbar region with neurogenic claudication     ICD-10-CM: M48.062 
ICD-9-CM: 724.03 Lumbar facet arthropathy     ICD-10-CM: M12.88 ICD-9-CM: 721.3 Vitals BP Pulse Temp Resp Height(growth percentile) Weight(growth percentile)  
 118/61 63 98.1 °F (36.7 °C) (Oral) 14 5' 10\" (1.778 m) 212 lb (96.2 kg) BMI Smoking Status 30.42 kg/m2 Never Smoker BMI and BSA Data  Body Mass Index Body Surface Area  
 30.42 kg/m 2 2.18 m 2  
  
  
 Preferred Pharmacy Pharmacy Name Phone [address], [facility] [phone] Your Updated Medication List  
  
   
This list is accurate as of: 12/12/17  2:35 PM.  Always use your most recent med list.  
  
  
  
  
 ADALAT CC PO Take 60 mg by mouth daily. ALLOPURINOL PO Take 300 mg by mouth daily. aspirin delayed-release 81 mg tablet Take  by mouth daily. DULoxetine 30 mg capsule Commonly known as:  CYMBALTA Take 30 mg by mouth daily. FLONASE 50 mcg/actuation nasal spray Generic drug:  fluticasone 2 Sprays by Both Nostrils route daily. * HYDROcodone-acetaminophen  mg tablet Commonly known as:  Patriziaa Pock Take 1 Tab by mouth three (3) times daily as needed. Max Daily Amount: 3 Tabs. Indications: Chronic Pain G89.29, DNF until start date * HYDROcodone-acetaminophen  mg tablet Commonly known as:  Patriziaa Pock Take 1 Tab by mouth three (3) times daily as needed. Max Daily Amount: 3 Tabs. Indications: Chronic Pain G89.29, DNF until start date Start taking on:  1/11/2018  
  
 * HYDROcodone-acetaminophen  mg tablet Commonly known as:  Patriziaa Pock Take 1 Tab by mouth three (3) times daily as needed. Max Daily Amount: 3 Tabs. Indications: Chronic Pain G89.29, DNF until start date. Start taking on:  2/10/2018  
  
 hydrocortisone 2.5 % rectal cream  
Commonly known as:  ANUSOL-HC Insert  into rectum. MENTAX 1 % topical cream  
Generic drug:  butenafine Mentax 1 % External Cream APPLY SPARINGLY TO AFFECTED AREA(S) ONCE DAILY  Quantity: 60    Ellyn Jc M.D.;  Started 12-Feb-2009 Active  
  
 polyethylene glycol 17 gram/dose powder Commonly known as:  Christine Clause Take 17 g by mouth daily as needed. SPIRONOLACTONE PO Take 25 mg by mouth two (2) times a day. TOPAMAX PO Take 100 mg by mouth two (2) times a day.  Indications: TREMORS  
  
 * Notice: This list has 3 medication(s) that are the same as other medications prescribed for you. Read the directions carefully, and ask your doctor or other care provider to review them with you. Prescriptions Printed Refills HYDROcodone-acetaminophen (NORCO)  mg tablet 0 Sig: Take 1 Tab by mouth three (3) times daily as needed. Max Daily Amount: 3 Tabs. Indications: Chronic Pain G89.29, DNF until start date Class: Print Route: Oral  
 HYDROcodone-acetaminophen (NORCO)  mg tablet 0 Starting on: 1/11/2018 Sig: Take 1 Tab by mouth three (3) times daily as needed. Max Daily Amount: 3 Tabs. Indications: Chronic Pain G89.29, DNF until start date Class: Print Route: Oral  
 HYDROcodone-acetaminophen (NORCO)  mg tablet 0 Starting on: 2/10/2018 Sig: Take 1 Tab by mouth three (3) times daily as needed. Max Daily Amount: 3 Tabs. Indications: Chronic Pain G89.29, DNF until start date. Class: Print Route: Oral  
  
Follow-up Instructions Return in about 3 months (around 3/12/2018) for Medication follow up. Patient Instructions Low Back Arthritis: Exercises Your Care Instructions Here are some examples of typical rehabilitation exercises for your condition. Start each exercise slowly. Ease off the exercise if you start to have pain. Your doctor or physical therapist will tell you when you can start these exercises and which ones will work best for you. When you are not being active, find a comfortable position for rest. Some people are comfortable on the floor or a medium-firm bed with a small pillow under their head and another under their knees. Some people prefer to lie on their side with a pillow between their knees. Don't stay in one position for too long. Take short walks (10 to 20 minutes) every 2 to 3 hours. Avoid slopes, hills, and stairs until you feel better.  Walk only distances you can manage without pain, especially leg pain. How to do the exercises Pelvic tilt 1. Lie on your back with your knees bent. 2. \"Brace\" your stomach-tighten your muscles by pulling in and imagining your belly button moving toward your spine. 3. Press your lower back into the floor. You should feel your hips and pelvis rock back. 4. Hold for 6 seconds while breathing smoothly. 5. Relax and allow your pelvis and hips to rock forward. 6. Repeat 8 to 12 times. Back stretches 1. Get down on your hands and knees on the floor. 2. Relax your head and allow it to droop. Round your back up toward the ceiling until you feel a nice stretch in your upper, middle, and lower back. Hold this stretch for as long as it feels comfortable, or about 15 to 30 seconds. 3. Return to the starting position with a flat back while you are on your hands and knees. 4. Let your back sway by pressing your stomach toward the floor. Lift your buttocks toward the ceiling. 5. Hold this position for 15 to 30 seconds. 6. Repeat 2 to 4 times. Follow-up care is a key part of your treatment and safety. Be sure to make and go to all appointments, and call your doctor if you are having problems. It's also a good idea to know your test results and keep a list of the medicines you take. Where can you learn more? Go to http://slava-todd.info/. Enter B212 in the search box to learn more about \"Low Back Arthritis: Exercises. \" Current as of: March 21, 2017 Content Version: 11.4 © 0155-7636 [employer]. Care instructions adapted under license by [employer] (which disclaims liability or warranty for this information). If you have questions about a medical condition or this instruction, always ask your healthcare professional. [address] any warranty or liability for your use of this information. Please provide this summary of care documentation to your next provider. Your primary care clinician is listed as David Monsivais. If you have any questions after today's visit, please call [phone].

## 2017-12-12 NOTE — PROGRESS NOTES
[facility]  [address] [place] [address], [facility]  Phone: [phone]  Fax: [phone]      ASSESSMENT   Diagnoses and all orders for this visit:    1. Other chronic pain  -     HYDROcodone-acetaminophen (NORCO)  mg tablet; Take 1 Tab by mouth three (3) times daily as needed. Max Daily Amount: 3 Tabs. Indications: Chronic Pain G89.29, DNF until start date  -     HYDROcodone-acetaminophen (NORCO)  mg tablet; Take 1 Tab by mouth three (3) times daily as needed. Max Daily Amount: 3 Tabs. Indications: Chronic Pain G89.29, DNF until start date  -     HYDROcodone-acetaminophen (NORCO)  mg tablet; Take 1 Tab by mouth three (3) times daily as needed. Max Daily Amount: 3 Tabs. Indications: Chronic Pain G89.29, DNF until start date.  -     DRUG SCREEN UR - W/ CONFIRM; Future    2. Use of opiates for therapeutic purposes  -     HYDROcodone-acetaminophen (NORCO)  mg tablet; Take 1 Tab by mouth three (3) times daily as needed. Max Daily Amount: 3 Tabs. Indications: Chronic Pain G89.29, DNF until start date  -     HYDROcodone-acetaminophen (NORCO)  mg tablet; Take 1 Tab by mouth three (3) times daily as needed. Max Daily Amount: 3 Tabs. Indications: Chronic Pain G89.29, DNF until start date  -     HYDROcodone-acetaminophen (NORCO)  mg tablet; Take 1 Tab by mouth three (3) times daily as needed. Max Daily Amount: 3 Tabs. Indications: Chronic Pain G89.29, DNF until start date.  -     DRUG SCREEN UR - W/ CONFIRM; Future    3. Spinal stenosis of lumbar region with neurogenic claudication    4. Lumbar facet arthropathy       IMPRESSION AND PLAN:  Ambrocio Abbott is a 74 y.o. male with history of chronic lumbar pain. He denies any change since his last office visit and takes up to 3 tabs Norco  mg as his pain warrants. 1) Pt was given information on lumbar arthritis exercises.    2) He received a refill of Norco  mg 1 tab TID prn pain. 3) A UDS was obtained. 4) Mr. Lóen Mae has a reminder for a \"due or due soon\" health maintenance. I have asked that he contact his primary care provider, Epifanio Sorto MD, for follow-up on this health maintenance. 5)  demonstrated consistency with prescribing. 6) Last UDS from 09/12/2017 was consistent. 7) Pt will follow-up in 3 months or sooner if needed. HISTORY OF PRESENT ILLNESS:  Susana Cassidy is a 74 y.o. male with history of chronic lumbar pain. He denies any change since his last office visit. Pt has been prescribed Norco  mg and takes up to 3 tabs daily as his pain warrants. He admits to intermittent constipation with the Norco and takes Miralax with benefit. Pt states that he keeps his pain medication locked up. Of note, patient's MME/day is 30. Pt at this time desires to continue with current care. Pain Scale: /10    PCP: Epifanio Sorto MD       Past Medical History:   Diagnosis Date    Allergy     Anxiety     ED (erectile dysfunction)     Elevated prostate specific antigen (PSA)     Essential hypertension     Essential tremor     Gout     Hypotestosteronism     Ill-defined condition     ESSENTIAL TREMORS    Legally blind     Nocturia     Nodular prostate without urinary obstruction     Panic attack     Prostate cancer ([facility] [address] 2/14/13    PNBx-T2a - Prostatic Volume: 26.7 grams - [place] 6 (3 + 3) -  Dr. Kinza Domingo Thyroid disease     small tumor        Social History     Social History    Marital status:      Spouse name: N/A    Number of children: N/A    Years of education: N/A     Occupational History    Not on file.      Social History Main Topics    Smoking status: Never Smoker    Smokeless tobacco: Never Used    Alcohol use Yes      Comment: seldom    Drug use: No    Sexual activity: Not on file     Other Topics Concern    Not on file     Social History Narrative       Current Outpatient Prescriptions   Medication Sig Dispense Refill    HYDROcodone-acetaminophen (NORCO)  mg tablet Take 1 Tab by mouth three (3) times daily as needed. Max Daily Amount: 3 Tabs. Indications: Chronic Pain G89.29, DNF until start date 90 Tab 0    [START ON 1/11/2018] HYDROcodone-acetaminophen (NORCO)  mg tablet Take 1 Tab by mouth three (3) times daily as needed. Max Daily Amount: 3 Tabs. Indications: Chronic Pain G89.29, DNF until start date 90 Tab 0    [START ON 2/10/2018] HYDROcodone-acetaminophen (NORCO)  mg tablet Take 1 Tab by mouth three (3) times daily as needed. Max Daily Amount: 3 Tabs. Indications: Chronic Pain G89.29, DNF until start date. 90 Tab 0    hydrocortisone (ANUSOL-HC) 2.5 % rectal cream Insert  into rectum.  butenafine (MENTAX) 1 % topical cream Mentax 1 % External Cream  APPLY SPARINGLY TO AFFECTED AREA(S) ONCE DAILY   Quantity: 60       Any Shoulders M.D.;  Started 12-Feb-2009  Active      fluticasone (FLONASE) 50 mcg/actuation nasal spray 2 Sprays by Both Nostrils route daily.  polyethylene glycol (MIRALAX) 17 gram/dose powder Take 17 g by mouth daily as needed. 510 g 5    DULoxetine (CYMBALTA) 30 mg capsule Take 30 mg by mouth daily.  aspirin delayed-release 81 mg tablet Take  by mouth daily.  NIFEDIPINE (ADALAT CC PO) Take 60 mg by mouth daily.  SPIRONOLACTONE PO Take 25 mg by mouth two (2) times a day.  ALLOPURINOL PO Take 300 mg by mouth daily.  TOPIRAMATE (TOPAMAX PO) Take 100 mg by mouth two (2) times a day. Indications: TREMORS         Allergies   Allergen Reactions    Contrast Agent [Iodine] Hives         REVIEW OF SYSTEMS    Constitutional: Negative for fever, chills, or weight change. Respiratory: Negative for cough or shortness of breath. Cardiovascular: Negative for chest pain or palpitations. Gastrointestinal: Negative for acid reflux, change in bowel habits, or constipation. Genitourinary: Negative for dysuria and flank pain. Musculoskeletal: Positive for lumbar pain. Skin: Negative for rash. Neurological: Negative for headaches, dizziness, or numbness. Endo/Heme/Allergies: Negative for increased bruising. Psychiatric/Behavioral: Negative for difficulty with sleep. PHYSICAL EXAMINATION  Visit Vitals    /61    Pulse 63    Temp 98.1 °F (36.7 °C) (Oral)    Resp 14    Ht 5' 10\" (1.778 m)    Wt 212 lb (96.2 kg)    BMI 30.42 kg/m2       Constitutional: Awake, alert, and in no acute distress. Neurological: 1+ symmetrical DTRs in the lower extremities. Sensation to light touch is intact. Skin: warm, dry, and intact. Musculoskeletal: Tenderness to palpation in the lower lumbar region. Moderate pain with extension and axial loading. No pain with internal or external rotation of his hips. Negative straight leg raise bilaterally. Patient ambulates with the assistance of a rolling walker. He is visually impaired. Hip Flex  Quads Hamstrings Ankle DF EHL Ankle PF   Right +4/5 +4/5 +4/5 +4/5 +4/5 +4/5   Left -4/5 -4/5 -4/5 +4/5 +4/5 +4/5     IMAGING:    Lumbar Spine MRI from 5/12/2015 was personally reviewed with the Pt and demonstrated:  IMPRESSION:  1. Partially evaluated complex left renal lesions. Recommend further evaluation with an ultrasound and/or contrast enhanced multiphase CT. 2. Multilevel degenerative disc disease and facet arthropathy with scoliotic deformity. This cause various degrees of spinal canal and neuroforaminal narrowing.    3. There is mild lumbar dextroscoliosis. Vertebral body height and alignment are normal. There is moderate disc space narrowing and osteophytosis from L1-L2 through L5-S1. There is facet arthropathy in the lower lumbar spine. Prostatic seed implants are noted. Written by Hugo Mcdowell, as dictated by Judi Saez MD.  I, Dr. Judi Saez confirm that all documentation is accurate.

## 2018-03-12 ENCOUNTER — OFFICE VISIT (OUTPATIENT)
Dept: ORTHOPEDIC SURGERY | Age: 73
End: 2018-03-12

## 2018-03-12 VITALS
TEMPERATURE: 98.6 F | HEIGHT: 70 IN | SYSTOLIC BLOOD PRESSURE: 130 MMHG | RESPIRATION RATE: 16 BRPM | WEIGHT: 202 LBS | DIASTOLIC BLOOD PRESSURE: 72 MMHG | HEART RATE: 80 BPM | BODY MASS INDEX: 28.92 KG/M2 | OXYGEN SATURATION: 98 %

## 2018-03-12 DIAGNOSIS — R26.9 GAIT ABNORMALITY: ICD-10-CM

## 2018-03-12 DIAGNOSIS — K64.9 HEMORRHOIDS, UNSPECIFIED HEMORRHOID TYPE: ICD-10-CM

## 2018-03-12 DIAGNOSIS — H54.8 LEGALLY BLIND: ICD-10-CM

## 2018-03-12 DIAGNOSIS — R29.898 WEAKNESS OF BOTH LEGS: ICD-10-CM

## 2018-03-12 DIAGNOSIS — K59.00 CONSTIPATION, UNSPECIFIED CONSTIPATION TYPE: ICD-10-CM

## 2018-03-12 DIAGNOSIS — Z79.891 USE OF OPIATES FOR THERAPEUTIC PURPOSES: ICD-10-CM

## 2018-03-12 DIAGNOSIS — G89.29 OTHER CHRONIC PAIN: Primary | ICD-10-CM

## 2018-03-12 RX ORDER — HYDROCORTISONE 25 MG/G
CREAM TOPICAL 2 TIMES DAILY
Qty: 90 G | Refills: 1 | Status: SHIPPED | OUTPATIENT
Start: 2018-03-12

## 2018-03-12 RX ORDER — HYDROCODONE BITARTRATE AND ACETAMINOPHEN 10; 325 MG/1; MG/1
1 TABLET ORAL
Qty: 90 TAB | Refills: 0 | Status: SHIPPED | OUTPATIENT
Start: 2018-04-11 | End: 2018-06-11 | Stop reason: SDUPTHER

## 2018-03-12 RX ORDER — ERGOCALCIFEROL 1.25 MG/1
50000 CAPSULE ORAL
COMMUNITY
End: 2019-12-11

## 2018-03-12 RX ORDER — POLYETHYLENE GLYCOL 3350 17 G/17G
17 POWDER, FOR SOLUTION ORAL
Qty: 510 G | Refills: 5 | Status: SHIPPED | OUTPATIENT
Start: 2018-03-12 | End: 2019-07-02 | Stop reason: SDUPTHER

## 2018-03-12 RX ORDER — HYDROCODONE BITARTRATE AND ACETAMINOPHEN 10; 325 MG/1; MG/1
1 TABLET ORAL
Qty: 90 TAB | Refills: 0 | Status: SHIPPED | OUTPATIENT
Start: 2018-05-10 | End: 2018-06-11 | Stop reason: SDUPTHER

## 2018-03-12 RX ORDER — HYDROCODONE BITARTRATE AND ACETAMINOPHEN 10; 325 MG/1; MG/1
1 TABLET ORAL
Qty: 90 TAB | Refills: 0 | Status: CANCELLED | OUTPATIENT
Start: 2018-03-12

## 2018-03-12 NOTE — PATIENT INSTRUCTIONS

## 2018-03-12 NOTE — MR AVS SNAPSHOT
[address] [place] [address] [address] [place] [address] 
[phone] Patient: Yolis Fuller MRN: [id number] Visit Information Date & Time Provider Department Dept. Phone Encounter #  
 3/12/2018  1:45 PM Liz Raza MD [facility] [phone] [id number] Follow-up Instructions Return in about 3 months (around 6/12/2018) for Medication follow up. Routing History 12/4/2018  9:30 AM  
Any with Sebastian Garcia MD  
[facility]) Appt Note: Return in about 1 year (around 12/1/2018) for Prostate CA, PSA prior. [address] [place] [address]  
[phone]  
  
   
 [place] [id number] Upcoming Health Maintenance Date Due DTaP/Tdap/Td series (1 - Tdap) 11/7/1964 FOBT Q 1 YEAR AGE 50-75 11/7/1993 ZOSTER VACCINE AGE 60> 9/7/2003 GLAUCOMA SCREENING Q2Y 11/7/2008 Bone Densitometry (Dexa) Screening 11/7/2008 MEDICARE YEARLY EXAM 11/7/2008 Pneumococcal 65+ High/Highest Risk (2 of 2 - PPSV23) 1/27/2017 Influenza Age 5 to Adult 8/1/2017 Allergies as of 3/12/2018  Review Complete On: 3/12/2018 By: Liz Raza MD  
  
 Severity Noted Reaction Type Reactions Contrast Agent [Iodine]  01/01/1995    Hives Current Immunizations  Never Reviewed No immunizations on file. Not reviewed this visit You Were Diagnosed With   
  
 Codes Comments Other chronic pain    -  Primary ICD-10-CM: G89.29 ICD-9-CM: 338.29 Use of opiates for therapeutic purposes     ICD-10-CM: Z79.891 ICD-9-CM: V58.69 Weakness of both legs     ICD-10-CM: R29.898 ICD-9-CM: 729.89 Constipation, unspecified constipation type     ICD-10-CM: K59.00 ICD-9-CM: 564.00 Gait abnormality     ICD-10-CM: R26.9 ICD-9-CM: 781.2 Hemorrhoids, unspecified hemorrhoid type     ICD-10-CM: K64.9 ICD-9-CM: 455.6 Legally blind     ICD-10-CM: H54.8 ICD-9-CM: 369.4 Vitals BP Pulse Temp Resp Height(growth percentile) Weight(growth percentile) 130/72 80 98.6 °F (37 °C) (Oral) 16 5' 10\" (1.778 m) 202 lb (91.6 kg) SpO2 BMI Smoking Status 98% 28.98 kg/m2 Never Smoker Vitals History BMI and BSA Data Body Mass Index Body Surface Area  
 28.98 kg/m 2 2.13 m 2 Preferred Pharmacy Pharmacy Name Phone [address] [place] [address], [facility] [address] [facility] [address] [phone] Your Updated Medication List  
  
   
This list is accurate as of 3/12/18  2:44 PM.  Always use your most recent med list.  
  
  
  
  
 ADALAT CC PO Take 60 mg by mouth daily. ALLOPURINOL PO Take 300 mg by mouth daily. aspirin delayed-release 81 mg tablet Take  by mouth daily. DULoxetine 30 mg capsule Commonly known as:  CYMBALTA Take 30 mg by mouth daily. FLONASE 50 mcg/actuation nasal spray Generic drug:  fluticasone 2 Sprays by Both Nostrils route daily. * HYDROcodone-acetaminophen  mg tablet Commonly known as:  Donna Records Take 1 Tab by mouth three (3) times daily as needed. Max Daily Amount: 3 Tabs. Indications: Chronic Pain G89.29, DNF until start date. * HYDROcodone-acetaminophen  mg tablet Commonly known as:  Donna Records Take 1 Tab by mouth three (3) times daily as needed. Max Daily Amount: 3 Tabs. Indications: Chronic Pain G89.29 Start taking on:  4/11/2018  
  
 * HYDROcodone-acetaminophen  mg tablet Commonly known as:  Donna Records Take 1 Tab by mouth three (3) times daily as needed. Max Daily Amount: 3 Tabs. Indications: Chronic Pain G89.29 Start taking on:  5/10/2018  
  
 hydrocortisone 2.5 % rectal cream  
Commonly known as:  ANUSOL-HC Insert  into rectum two (2) times a day. Use as needed as directed MENTAX 1 % topical cream  
Generic drug:  butenafine Mentax 1 % External Cream APPLY SPARINGLY TO AFFECTED AREA(S) ONCE DAILY  Quantity: 60    Mane An M.D.;  Started 12-Feb-2009 Active  
  
 polyethylene glycol 17 gram/dose powder Commonly known as:  Luli Christie Take 17 g by mouth daily as needed. SPIRONOLACTONE PO Take 25 mg by mouth two (2) times a day. TOPAMAX PO Take 100 mg by mouth two (2) times a day. Indications: TREMORS  
  
 VITAMIN D2 50,000 unit capsule Generic drug:  ergocalciferol Take 50,000 Units by mouth. * Notice: This list has 3 medication(s) that are the same as other medications prescribed for you. Read the directions carefully, and ask your doctor or other care provider to review them with you. Prescriptions Printed Refills HYDROcodone-acetaminophen (NORCO)  mg tablet 0 Starting on: 5/10/2018 Sig: Take 1 Tab by mouth three (3) times daily as needed. Max Daily Amount: 3 Tabs. Indications: Chronic Pain G89.29 Class: Print Route: Oral  
 HYDROcodone-acetaminophen (NORCO)  mg tablet 0 Starting on: 4/11/2018 Sig: Take 1 Tab by mouth three (3) times daily as needed. Max Daily Amount: 3 Tabs. Indications: Chronic Pain G89.29 Class: Print Route: Oral  
  
Prescriptions Sent to Pharmacy Refills  
 polyethylene glycol (MIRALAX) 17 gram/dose powder 5 Sig: Take 17 g by mouth daily as needed. Class: Normal  
 Pharmacy: [address] [place] [address] Ph #: [phone] Route: Oral  
 hydrocortisone (ANUSOL-HC) 2.5 % rectal cream 1 Sig: Insert  into rectum two (2) times a day. Use as needed as directed Class: Normal  
 Pharmacy: [address] [place] [address] Ph #: [phone] Route: Rectal  
  
Follow-up Instructions Return in about 3 months (around 6/12/2018) for Medication follow up. Patient Instructions Low Back Arthritis: Exercises Your Care Instructions Here are some examples of typical rehabilitation exercises for your condition. Start each exercise slowly. Ease off the exercise if you start to have pain. Your doctor or physical therapist will tell you when you can start these exercises and which ones will work best for you. When you are not being active, find a comfortable position for rest. Some people are comfortable on the floor or a medium-firm bed with a small pillow under their head and another under their knees. Some people prefer to lie on their side with a pillow between their knees. Don't stay in one position for too long. Take short walks (10 to 20 minutes) every 2 to 3 hours. Avoid slopes, hills, and stairs until you feel better. Walk only distances you can manage without pain, especially leg pain. How to do the exercises Pelvic tilt 1. Lie on your back with your knees bent. 2. \"Brace\" your stomach-tighten your muscles by pulling in and imagining your belly button moving toward your spine. 3. Press your lower back into the floor. You should feel your hips and pelvis rock back. 4. Hold for 6 seconds while breathing smoothly. 5. Relax and allow your pelvis and hips to rock forward. 6. Repeat 8 to 12 times. Back stretches 1. Get down on your hands and knees on the floor. 2. Relax your head and allow it to droop. Round your back up toward the ceiling until you feel a nice stretch in your upper, middle, and lower back. Hold this stretch for as long as it feels comfortable, or about 15 to 30 seconds. 3. Return to the starting position with a flat back while you are on your hands and knees. 4. Let your back sway by pressing your stomach toward the floor. Lift your buttocks toward the ceiling. 5. Hold this position for 15 to 30 seconds. 6. Repeat 2 to 4 times. Follow-up care is a key part of your treatment and safety.  Be sure to make and go to all appointments, and call your doctor if you are having problems. It's also a good idea to know your test results and keep a list of the medicines you take. Where can you learn more? Go to http://slava-todd.info/. Enter F024 in the search box to learn more about \"Low Back Arthritis: Exercises. \" Current as of: March 21, 2017 Content Version: 11.4 © 5215-1574 [employer]. Care instructions adapted under license by [employer] (which disclaims liability or warranty for this information). If you have questions about a medical condition or this instruction, always ask your healthcare professional. [address] any warranty or liability for your use of this information. Please provide this summary of care documentation to your next provider. Your primary care clinician is listed as [employer]. If you have any questions after today's visit, please call [phone].

## 2018-03-12 NOTE — PROGRESS NOTES
[facility]  Denise Hanna., [address] [place] [address], [facility] [address]  Phone: [phone]  Fax: [phone]    Pt's YOB: 1945    ASSESSMENT   Diagnoses and all orders for this visit:    1. Other chronic pain  -     HYDROcodone-acetaminophen (NORCO)  mg tablet; Take 1 Tab by mouth three (3) times daily as needed. Max Daily Amount: 3 Tabs. Indications: Chronic Pain G89.29  -     HYDROcodone-acetaminophen (NORCO)  mg tablet; Take 1 Tab by mouth three (3) times daily as needed. Max Daily Amount: 3 Tabs. Indications: Chronic Pain G89.29    2. Use of opiates for therapeutic purposes  -     HYDROcodone-acetaminophen (NORCO)  mg tablet; Take 1 Tab by mouth three (3) times daily as needed. Max Daily Amount: 3 Tabs. Indications: Chronic Pain G89.29  -     HYDROcodone-acetaminophen (NORCO)  mg tablet; Take 1 Tab by mouth three (3) times daily as needed. Max Daily Amount: 3 Tabs. Indications: Chronic Pain G89.29    3. Weakness of both legs    4. Constipation, unspecified constipation type  -     polyethylene glycol (MIRALAX) 17 gram/dose powder; Take 17 g by mouth daily as needed. 5. Gait abnormality    6. Hemorrhoids, unspecified hemorrhoid type  -     hydrocortisone (ANUSOL-HC) 2.5 % rectal cream; Insert  into rectum two (2) times a day. Use as needed as directed    7. Legally blind         IMPRESSION AND PLAN:  Archie Herrera is a 74 y.o. male with history of chronic lumbar pain. Pt reports 7/10 pain and continues to take Norco  mg 2-3 tabs daily as his pain warrants with benefit. 1) Pt was given information on lumbar arthritis exercises. 2) He received a refill of Norco  mg 1 tab TID prn chronic pain. 3) Pt also received a refill of Miralax 17 gram daily as needed. 4) He was encouraged to keep his pain medication under lock and key.    5) Pt received a refill of Anusol-HC 2.5% rectal cream.  6) Mr. Santacruz Deepa has a reminder for a \"due or due soon\" health maintenance. I have asked that he contact his primary care provider, Fred Santamaria MD, for follow-up on this health maintenance. 7)  demonstrated consistency with prescribing. 8) Last UDS from 12/12/2017 was consistent. 9) Pt will follow-up in 3 months or sooner if needed. HISTORY OF PRESENT ILLNESS:  Yolis Fuller is a 74 y.o. male with history of chronic lumbar pain. Pt reports that he is doing well at this time and is experiencing 7/10 pain. Pt has been prescribed Norco  mg and takes 2-3 tabs daily as his pain warrants. He admits to improvement in his pain when taking the Norco and this allows him to be more functional.. Of note, patient's MME/day is 20-30. His wife recently passed away from terminal COPD since his last office visit. He was able to assist in a limited way with her care and she had been on hospice due to her severe respiratory condition. He does have in home assistance Monday-Friday from the [facility]. He gets help with cooking, ADLs and in home needs from 8-12 and reports he goes to his sister-in-law's home or his daughter'sy home on the weekend. He is visually impaired and weak and not able to provide for his own physical needs without assistance. He requests a refill on the Anusol pads (which he forgot to request from Dr. Noemi Rojas) as well as the Miralax which he uses for constipation. He does keep his medicine locked up. . Pt at this time desires to continue with current care. He has three daughter's, a step-daughter, one that lives in [place], and one that lives in [place].      Pain Scale: 7/10    PCP: Fred Santamaria MD     Past Medical History:   Diagnosis Date    Allergy     Anxiety     ED (erectile dysfunction)     Elevated prostate specific antigen (PSA)     Essential hypertension     Essential tremor     Gout     Hypotestosteronism     Ill-defined condition     ESSENTIAL TREMORS    Legally blind  Nocturia     Nodular prostate without urinary obstruction     Panic attack     Prostate cancer ([facility] [address] 2/14/13    PNBx-T2a - Prostatic Volume: 26.7 grams - [place] 6 (3 + 3) -  Dr. Claudia Contreras Thyroid disease     small tumor        Social History     Social History    Marital status:      Spouse name: N/A    Number of children: N/A    Years of education: N/A     Occupational History    Not on file. Social History Main Topics    Smoking status: Never Smoker    Smokeless tobacco: Never Used    Alcohol use Yes      Comment: seldom    Drug use: No    Sexual activity: Not on file     Other Topics Concern    Not on file     Social History Narrative       Current Outpatient Prescriptions   Medication Sig Dispense Refill    ergocalciferol (VITAMIN D2) 50,000 unit capsule Take 50,000 Units by mouth.  [START ON 5/10/2018] HYDROcodone-acetaminophen (NORCO)  mg tablet Take 1 Tab by mouth three (3) times daily as needed. Max Daily Amount: 3 Tabs. Indications: Chronic Pain G89.29 90 Tab 0    [START ON 4/11/2018] HYDROcodone-acetaminophen (NORCO)  mg tablet Take 1 Tab by mouth three (3) times daily as needed. Max Daily Amount: 3 Tabs. Indications: Chronic Pain G89.29 90 Tab 0    polyethylene glycol (MIRALAX) 17 gram/dose powder Take 17 g by mouth daily as needed. 510 g 5    hydrocortisone (ANUSOL-HC) 2.5 % rectal cream Insert  into rectum two (2) times a day. Use as needed as directed 90 g 1    HYDROcodone-acetaminophen (NORCO)  mg tablet Take 1 Tab by mouth three (3) times daily as needed. Max Daily Amount: 3 Tabs. Indications: Chronic Pain G89.29, DNF until start date. 90 Tab 0    butenafine (MENTAX) 1 % topical cream Mentax 1 % External Cream  APPLY SPARINGLY TO AFFECTED AREA(S) ONCE DAILY   Quantity: 60       Stefanie Cage M.D.;  Started 12-Feb-2009  Active      fluticasone (FLONASE) 50 mcg/actuation nasal spray 2 Sprays by Both Nostrils route daily.       DULoxetine (CYMBALTA) 30 mg capsule Take 30 mg by mouth daily.  aspirin delayed-release 81 mg tablet Take  by mouth daily.  NIFEDIPINE (ADALAT CC PO) Take 60 mg by mouth daily.  SPIRONOLACTONE PO Take 25 mg by mouth two (2) times a day.  ALLOPURINOL PO Take 300 mg by mouth daily.  TOPIRAMATE (TOPAMAX PO) Take 100 mg by mouth two (2) times a day. Indications: TREMORS         Allergies   Allergen Reactions    Contrast Agent [Iodine] Hives         REVIEW OF SYSTEMS    Constitutional: Negative for fever, chills, or weight change; pt is legally blind   Respiratory: Negative for cough or shortness of breath. Cardiovascular: Negative for chest pain or palpitations. Gastrointestinal: Negative for acid reflux, change in bowel habits; Positive for hemorrhoids and constipation. Genitourinary: Negative for dysuria and flank pain. Musculoskeletal: Positive for lumbar pain and leg weakness  Skin: Negative for rash. Neurological: Negative for headaches, dizziness, or numbness. Endo/Heme/Allergies: Negative for increased bruising. Psychiatric/Behavioral: Negative for difficulty with sleep. PHYSICAL EXAMINATION  Visit Vitals    /72    Pulse 80    Temp 98.6 °F (37 °C) (Oral)    Resp 16    Ht 5' 10\" (1.778 m)    Wt 202 lb (91.6 kg)    SpO2 98%    BMI 28.98 kg/m2       Constitutional: Awake, alert, and in no acute distress. Neurological: 1+ symmetrical DTRs in the lower extremities. Sensation to light touch is intact. Skin: warm, dry, and intact. Musculoskeletal: Tenderness to palpation in the lower lumbar region. Moderate pain with extension and axial loading. Difficulty transitioning sit to stand. No pain with internal or external rotation of his hips. Negative straight leg raise bilaterally. Patient ambulates with the assistance of a rolling walker.          Biceps  Triceps Deltoids Wrist Ext Wrist Flex Hand Intrin   Right +4/5 +4/5 +4/5 +4/5 +4/5 +4/5   Left +4/5 +4/5 +4/5 +4/5 +4/5 +4/5      Hip Flex  Quads Hamstrings Ankle DF EHL Ankle PF   Right -4/5 -4/5 -4/5 +4/5 +4/5 +4/5   Left -4/5 -4/5 -4/5 +4/5 +4/5 +4/5     IMAGING:    Lumbar Spine MRI from 5/12/2015 was personally reviewed with the Pt and demonstrated:  IMPRESSION:  1. Partially evaluated complex left renal lesions. Recommend further evaluation with an ultrasound and/or contrast enhanced multiphase CT. 2. Multilevel degenerative disc disease and facet arthropathy with scoliotic deformity. This cause various degrees of spinal canal and neuroforaminal narrowing.    3. There is mild lumbar dextroscoliosis. Vertebral body height and alignment are normal. There is moderate disc space narrowing and osteophytosis from L1-L2 through L5-S1. There is facet arthropathy in the lower lumbar spine. Prostatic seed implants are noted. Written by Gomez Mahoney, as dictated by Tiki Carson MD.  I, Dr. Tiki Carson confirm that all documentation is accurate.

## 2018-05-02 ENCOUNTER — HOSPITAL ENCOUNTER (OUTPATIENT)
Dept: LAB | Age: 73
Discharge: HOME | End: 2018-05-02
Payer: MEDICARE

## 2018-05-02 DIAGNOSIS — E29.1 HYPOGONADISM MALE: ICD-10-CM

## 2018-05-02 DIAGNOSIS — C61 MALIGNANT NEOPLASM OF PROSTATE (HCC): ICD-10-CM

## 2018-05-02 DIAGNOSIS — C61 PROSTATE CANCER (HCC): ICD-10-CM

## 2018-05-02 LAB — PSA SERPL-MCNC: 0.4 NG/ML (ref 0–4)

## 2018-05-02 PROCEDURE — 84153 ASSAY OF PSA TOTAL: CPT | Performed by: UROLOGY

## 2018-05-02 PROCEDURE — 36415 COLL VENOUS BLD VENIPUNCTURE: CPT | Performed by: UROLOGY

## 2018-05-09 ENCOUNTER — HOSPITAL ENCOUNTER (OUTPATIENT)
Dept: RADIATION THERAPY | Age: 73
Discharge: HOME | End: 2018-05-09

## 2018-06-11 ENCOUNTER — OFFICE VISIT (OUTPATIENT)
Dept: ORTHOPEDIC SURGERY | Age: 73
End: 2018-06-11

## 2018-06-11 VITALS
HEIGHT: 70 IN | HEART RATE: 69 BPM | SYSTOLIC BLOOD PRESSURE: 124 MMHG | TEMPERATURE: 97.5 F | BODY MASS INDEX: 29.95 KG/M2 | DIASTOLIC BLOOD PRESSURE: 75 MMHG | WEIGHT: 209.2 LBS | OXYGEN SATURATION: 99 % | RESPIRATION RATE: 15 BRPM

## 2018-06-11 DIAGNOSIS — G89.29 OTHER CHRONIC PAIN: Primary | ICD-10-CM

## 2018-06-11 DIAGNOSIS — M25.551 RIGHT HIP PAIN: ICD-10-CM

## 2018-06-11 DIAGNOSIS — R29.898 WEAKNESS OF BOTH LEGS: ICD-10-CM

## 2018-06-11 DIAGNOSIS — Z79.891 USE OF OPIATES FOR THERAPEUTIC PURPOSES: ICD-10-CM

## 2018-06-11 DIAGNOSIS — M48.062 SPINAL STENOSIS OF LUMBAR REGION WITH NEUROGENIC CLAUDICATION: ICD-10-CM

## 2018-06-11 DIAGNOSIS — G89.29 OTHER CHRONIC PAIN: ICD-10-CM

## 2018-06-11 DIAGNOSIS — M70.61 TROCHANTERIC BURSITIS OF RIGHT HIP: ICD-10-CM

## 2018-06-11 DIAGNOSIS — R26.9 GAIT ABNORMALITY: ICD-10-CM

## 2018-06-11 DIAGNOSIS — M47.816 LUMBAR FACET ARTHROPATHY: ICD-10-CM

## 2018-06-11 RX ORDER — HYDROCODONE BITARTRATE AND ACETAMINOPHEN 10; 325 MG/1; MG/1
1 TABLET ORAL
Qty: 90 TAB | Refills: 0 | Status: SHIPPED | OUTPATIENT
Start: 2018-08-10 | End: 2018-09-13

## 2018-06-11 RX ORDER — DICLOFENAC SODIUM 10 MG/G
GEL TOPICAL 4 TIMES DAILY
Qty: 5 EACH | Refills: 3 | Status: SHIPPED | OUTPATIENT
Start: 2018-06-11 | End: 2019-07-02 | Stop reason: SDUPTHER

## 2018-06-11 RX ORDER — HYDROCODONE BITARTRATE AND ACETAMINOPHEN 10; 325 MG/1; MG/1
1 TABLET ORAL
Qty: 90 TAB | Refills: 0 | Status: SHIPPED | OUTPATIENT
Start: 2018-07-11 | End: 2018-09-13

## 2018-06-11 RX ORDER — HYDROCODONE BITARTRATE AND ACETAMINOPHEN 10; 325 MG/1; MG/1
1 TABLET ORAL
Qty: 90 TAB | Refills: 0 | Status: SHIPPED | OUTPATIENT
Start: 2018-06-11 | End: 2018-09-13

## 2018-06-11 NOTE — PROGRESS NOTES
[facility]  [address] [place] [address], [facility]  Phone: [phone]  Fax: [phone]    Pt's YOB: 1945    ASSESSMENT   Diagnoses and all orders for this visit:    1. Other chronic pain  -     DRUG SCREEN UR - W/ CONFIRM; Future  -     HYDROcodone-acetaminophen (NORCO)  mg tablet; Take 1 Tab by mouth three (3) times daily as needed. Max Daily Amount: 3 Tabs. Indications: Chronic Pain G89.29, DNF until start date.  -     HYDROcodone-acetaminophen (NORCO)  mg tablet; Take 1 Tab by mouth three (3) times daily as needed. Max Daily Amount: 3 Tabs. Indications: Chronic Pain G89.29, DNF until start date.  -     HYDROcodone-acetaminophen (NORCO)  mg tablet; Take 1 Tab by mouth three (3) times daily as needed. Max Daily Amount: 3 Tabs. Indications: Chronic Pain G89.29, DNF until start date. 2. Use of opiates for therapeutic purposes  -     DRUG SCREEN UR - W/ CONFIRM; Future  -     HYDROcodone-acetaminophen (NORCO)  mg tablet; Take 1 Tab by mouth three (3) times daily as needed. Max Daily Amount: 3 Tabs. Indications: Chronic Pain G89.29, DNF until start date.  -     HYDROcodone-acetaminophen (NORCO)  mg tablet; Take 1 Tab by mouth three (3) times daily as needed. Max Daily Amount: 3 Tabs. Indications: Chronic Pain G89.29, DNF until start date.  -     HYDROcodone-acetaminophen (NORCO)  mg tablet; Take 1 Tab by mouth three (3) times daily as needed. Max Daily Amount: 3 Tabs. Indications: Chronic Pain G89.29, DNF until start date. 3. Spinal stenosis of lumbar region with neurogenic claudication    4. Trochanteric bursitis of right hip    5. Lumbar facet arthropathy (HCC)    6. Right hip pain  -     diclofenac (VOLTAREN) 1 % gel; Apply  to affected area four (4) times daily. Apply to right hip 4 times per day as directed    7. Gait abnormality    8.  Weakness of both legs         IMPRESSION AND PLAN:  [facility] Maxi Walker is a 74 y.o. male with history of chronic lumbar pain. He complains of increased pain in the right hip that extends down the leg below the knee when walking. Pt takes Norco  mg 1 tab TID with benefit. Malik Garcia 1) Pt was given information on lumbar arthritis and trochanteric bursitis exercises. 2) He received a refill of Norco  mg 1 tab TID prn pain. 3) Pt was prescribed Voltaren 1% gel 4 grams to right hip 4x daily. 4) A UDS was obtained. 5) Mr. Diamond Wilson has a reminder for a \"due or due soon\" health maintenance. I have asked that he contact his primary care provider, Georgette Shelton MD, for follow-up on this health maintenance. 6)  demonstrated consistency with prescribing. 7) Last UDS from 03/12/2018 was consistent. 8) Pt will follow-up in 3 months or sooner if needed. HISTORY OF PRESENT ILLNESS:  Thomas Disla is a 74 y.o. male with history of chronic lumbar pain. He complains of increased pain in the right hip that extends down the leg below the knee when walking. Pt states that he can only stand for short periods of time to brush his teeth due to pain. He admits that he also experiences pain in the right leg/hip when sitting and laying down. Pt has been prescribed Norco  mg (MME is 30)  and takes 1 tab TID. He currently takes aspirin. Pt admits to frequent pain in the elbows but denies any pain in the shoulders or knees. He takes Miralax as needed for constipation. Pt will follow up with his PCP, Dr. Maritza Dhaliwal, tomorrow and at that time will request a refill of his hemorrhoid cream. Pt at this time desires to proceed with medication evaluation.     Pain Scale: 7/10    PCP: Georgette Shelton MD     Past Medical History:   Diagnosis Date    Allergy     Anxiety     ED (erectile dysfunction)     Elevated prostate specific antigen (PSA)     Essential hypertension     Essential tremor     Gout     Hypotestosteronism     Ill-defined condition     ESSENTIAL TREMORS    Legally blind     Nocturia     Nodular prostate without urinary obstruction     Panic attack     Prostate cancer ([facility] [address] 2/14/13    PNBx-T2a - Prostatic Volume: 26.7 grams - [place] 6 (3 + 3) -  Dr. Danielle Uribe Thyroid disease     small tumor        Social History     Social History    Marital status:      Spouse name: N/A    Number of children: N/A    Years of education: N/A     Occupational History    Not on file. Social History Main Topics    Smoking status: Never Smoker    Smokeless tobacco: Never Used    Alcohol use Yes      Comment: seldom    Drug use: No    Sexual activity: Not on file     Other Topics Concern    Not on file     Social History Narrative       Current Outpatient Prescriptions   Medication Sig Dispense Refill    AFEDITAB CR 60 mg ER tablet       diclofenac (VOLTAREN) 1 % gel Apply  to affected area four (4) times daily. Apply to right hip 4 times per day as directed 5 Each 3    [START ON 8/10/2018] HYDROcodone-acetaminophen (NORCO)  mg tablet Take 1 Tab by mouth three (3) times daily as needed. Max Daily Amount: 3 Tabs. Indications: Chronic Pain G89.29, DNF until start date. 90 Tab 0    [START ON 7/11/2018] HYDROcodone-acetaminophen (NORCO)  mg tablet Take 1 Tab by mouth three (3) times daily as needed. Max Daily Amount: 3 Tabs. Indications: Chronic Pain G89.29, DNF until start date. 90 Tab 0    HYDROcodone-acetaminophen (NORCO)  mg tablet Take 1 Tab by mouth three (3) times daily as needed. Max Daily Amount: 3 Tabs. Indications: Chronic Pain G89.29, DNF until start date. 90 Tab 0    ergocalciferol (VITAMIN D2) 50,000 unit capsule Take 50,000 Units by mouth.  polyethylene glycol (MIRALAX) 17 gram/dose powder Take 17 g by mouth daily as needed. 510 g 5    hydrocortisone (ANUSOL-HC) 2.5 % rectal cream Insert  into rectum two (2) times a day.  Use as needed as directed 90 g 1    butenafine (MENTAX) 1 % topical cream Mentax 1 % External Cream  APPLY SPARINGLY TO AFFECTED AREA(S) ONCE DAILY   Quantity: 60       Vivian Harvey M.D.;  Started 12-Feb-2009  Active      fluticasone (FLONASE) 50 mcg/actuation nasal spray 2 Sprays by Both Nostrils route daily.  DULoxetine (CYMBALTA) 30 mg capsule Take 30 mg by mouth daily.  aspirin delayed-release 81 mg tablet Take  by mouth daily.  NIFEDIPINE (ADALAT CC PO) Take 60 mg by mouth daily.  SPIRONOLACTONE PO Take 25 mg by mouth two (2) times a day.  ALLOPURINOL PO Take 300 mg by mouth daily.  TOPIRAMATE (TOPAMAX PO) Take 100 mg by mouth two (2) times a day. Indications: TREMORS         Allergies   Allergen Reactions    Contrast Agent [Iodine] Hives         REVIEW OF SYSTEMS    Constitutional: Negative for fever, chills, or weight change. Respiratory: Negative for cough or shortness of breath. Cardiovascular: Negative for chest pain or palpitations. Gastrointestinal: Negative for acid reflux, change in bowel habits, or constipation. Genitourinary: Negative for dysuria and flank pain. Musculoskeletal: Positive for lumbar and hip pain. Skin: Negative for rash. Neurological: Negative for headaches, dizziness, or numbness. Endo/Heme/Allergies: Negative for increased bruising. Psychiatric/Behavioral: Negative for difficulty with sleep. PHYSICAL EXAMINATION  Visit Vitals    /75    Pulse 69    Temp 97.5 °F (36.4 °C)    Resp 15    Ht 5' 10\" (1.778 m)    Wt 209 lb 3.2 oz (94.9 kg)    SpO2 99%    BMI 30.02 kg/m2       Constitutional: Awake, alert, and in no acute distress. Neurological: 1+ symmetrical DTRs in the lower extremities. Sensation to light touch is intact. Skin: warm, dry, and intact. Musculoskeletal: Tenderness to palpation in the lower lumbar region. Moderate pain with extension and axial loading. Pain with palpation over the right greater trochanter. No pain with internal or external rotation of his hips.  Negative straight leg raise bilaterally. Patient ambulates with the assistance of a rolling walker; he is visually impaired     Hip Flex  Quads Hamstrings Ankle DF EHL Ankle PF   Right  4/5 4/5 4/5 4/5 4/5 4/5   Left  4/5 4/5 4/5 4/5 4/5 4/5     IMAGING:    Lumbar Spine MRI from 5/12/2015 was personally reviewed with the Pt and demonstrated:  IMPRESSION:  1. Partially evaluated complex left renal lesions. Recommend further evaluation with an ultrasound and/or contrast enhanced multiphase CT. 2. Multilevel degenerative disc disease and facet arthropathy with scoliotic deformity. This cause various degrees of spinal canal and neuroforaminal narrowing.    3. There is mild lumbar dextroscoliosis. Vertebral body height and alignment are normal. There is moderate disc space narrowing and osteophytosis from L1-L2 through L5-S1. There is facet arthropathy in the lower lumbar spine. Prostatic seed implants are noted. Written by Douglas Marie, as dictated by Hal Arzate MD.  I, Dr. Hal Arzate confirm that all documentation is accurate.

## 2018-06-11 NOTE — MR AVS SNAPSHOT
[address] [place] [address] [address] [place] [address] 
[phone] Patient: Thomas Disla MRN: [id number] CLL:[id number] Visit Information Date & Time Provider Department Dept. Phone Encounter #  
 6/11/2018  1:30 PM Lisette Bedolla MD [facility] [phone] Follow-up Instructions Return in about 3 months (around 9/11/2018) for Medication follow up.  
  
 12/4/2018  9:30 AM  
Any with Parul Duncan MD  
[facility]) Appt Note: Return in about 1 year (around 12/1/2018) for Prostate CA, PSA prior. [address] [facility] [address]  
[phone]  
  
   
 [place] [id number] Upcoming Health Maintenance Date Due DTaP/Tdap/Td series (1 - Tdap) 11/7/1964 FOBT Q 1 YEAR AGE 50-75 11/7/1993 ZOSTER VACCINE AGE 60> 9/7/2003 GLAUCOMA SCREENING Q2Y 11/7/2008 Pneumococcal 65+ High/Highest Risk (2 of 2 - PPSV23) 1/27/2017 MEDICARE YEARLY EXAM 3/14/2018 Influenza Age 5 to Adult 8/1/2018 Allergies as of 6/11/2018  Review Complete On: 6/11/2018 By: Lisette Bedolla MD  
  
 Severity Noted Reaction Type Reactions Contrast Agent [Iodine]  01/01/1995    Hives Current Immunizations  Never Reviewed No immunizations on file. Not reviewed this visit You Were Diagnosed With   
  
 Codes Comments Other chronic pain    -  Primary ICD-10-CM: G89.29 ICD-9-CM: 338.29 Use of opiates for therapeutic purposes     ICD-10-CM: Z79.891 ICD-9-CM: V58.69 Spinal stenosis of lumbar region with neurogenic claudication     ICD-10-CM: M48.062 
ICD-9-CM: 724.03 Lumbar facet arthropathy (HCC)     ICD-10-CM: M46.96 
ICD-9-CM: 721.3 Right hip pain     ICD-10-CM: M25.551 ICD-9-CM: 719.45 Gait abnormality     ICD-10-CM: R26.9 ICD-9-CM: 781.2 Weakness of both legs     ICD-10-CM: R29.898 ICD-9-CM: 729.89 Vitals BP Pulse Temp Resp Height(growth percentile) Weight(growth percentile) 124/75 69 97.5 °F (36.4 °C) 15 5' 10\" (1.778 m) 209 lb 3.2 oz (94.9 kg) SpO2 BMI Smoking Status 99% 30.02 kg/m2 Never Smoker BMI and BSA Data Body Mass Index Body Surface Area 30.02 kg/m 2 2.16 m 2 Preferred Pharmacy Pharmacy Name Phone [address], [facility] [address] [phone] Your Updated Medication List  
  
   
This list is accurate as of 6/11/18  2:14 PM.  Always use your most recent med list.  
  
  
  
  
 * ADALAT CC PO Take 60 mg by mouth daily. * AFEDITAB CR 60 mg ER tablet Generic drug:  NIFEdipine ER  
  
 ALLOPURINOL PO Take 300 mg by mouth daily. aspirin delayed-release 81 mg tablet Take  by mouth daily. diclofenac 1 % Gel Commonly known as:  VOLTAREN Apply  to affected area four (4) times daily. Apply to right hip 4 times per day as directed DULoxetine 30 mg capsule Commonly known as:  CYMBALTA Take 30 mg by mouth daily. FLONASE 50 mcg/actuation nasal spray Generic drug:  fluticasone 2 Sprays by Both Nostrils route daily. * HYDROcodone-acetaminophen  mg tablet Commonly known as:  Bisi Daniels Take 1 Tab by mouth three (3) times daily as needed. Max Daily Amount: 3 Tabs. Indications: Chronic Pain G89.29, DNF until start date. * HYDROcodone-acetaminophen  mg tablet Commonly known as:  Bisi Daniels Take 1 Tab by mouth three (3) times daily as needed. Max Daily Amount: 3 Tabs. Indications: Chronic Pain G89.29, DNF until start date. Start taking on:  7/11/2018  
  
 * HYDROcodone-acetaminophen  mg tablet Commonly known as:  Bisi Daniels Take 1 Tab by mouth three (3) times daily as needed. Max Daily Amount: 3 Tabs. Indications: Chronic Pain G89.29, DNF until start date. Start taking on:  8/10/2018  
  
 hydrocortisone 2.5 % rectal cream  
Commonly known as:  ANUSOL-HC Insert  into rectum two (2) times a day. Use as needed as directed MENTAX 1 % topical cream  
Generic drug:  butenafine Mentax 1 % External Cream APPLY SPARINGLY TO AFFECTED AREA(S) ONCE DAILY  Quantity: 60    Katelin Sorenson M.D.;  Started 12-Feb-2009 Active  
  
 polyethylene glycol 17 gram/dose powder Commonly known as:  José Miguel Burnettter Take 17 g by mouth daily as needed. SPIRONOLACTONE PO Take 25 mg by mouth two (2) times a day. TOPAMAX PO Take 100 mg by mouth two (2) times a day. Indications: TREMORS  
  
 VITAMIN D2 50,000 unit capsule Generic drug:  ergocalciferol Take 50,000 Units by mouth. * Notice: This list has 5 medication(s) that are the same as other medications prescribed for you. Read the directions carefully, and ask your doctor or other care provider to review them with you. Prescriptions Printed Refills  
 diclofenac (VOLTAREN) 1 % gel 3 Sig: Apply  to affected area four (4) times daily. Apply to right hip 4 times per day as directed Class: Print Route: Topical  
 HYDROcodone-acetaminophen (NORCO)  mg tablet 0 Starting on: 8/10/2018 Sig: Take 1 Tab by mouth three (3) times daily as needed. Max Daily Amount: 3 Tabs. Indications: Chronic Pain G89.29, DNF until start date. Class: Print Route: Oral  
 HYDROcodone-acetaminophen (NORCO)  mg tablet 0 Starting on: 7/11/2018 Sig: Take 1 Tab by mouth three (3) times daily as needed. Max Daily Amount: 3 Tabs. Indications: Chronic Pain G89.29, DNF until start date. Class: Print Route: Oral  
 HYDROcodone-acetaminophen (NORCO)  mg tablet 0 Sig: Take 1 Tab by mouth three (3) times daily as needed. Max Daily Amount: 3 Tabs. Indications: Chronic Pain G89.29, DNF until start date. Class: Print Route: Oral  
  
Follow-up Instructions Return in about 3 months (around 9/11/2018) for Medication follow up. To-Do List   
 06/11/2018 Lab:  DRUG SCREEN UR - W/ CONFIRM   
  
 05/08/2019 8:00 AM  
  Appointment with Lizzette [facility] RADIATION THERAPY ([phone]) ATTENTION: The Appointment Time in [address] may not reflect your scheduled appointment time as the time is only a place mg. If you have any questions about your appointment time, please call [facility] Radiation Therapy at [phone]. Patient Instructions Low Back Arthritis: Exercises Your Care Instructions Here are some examples of typical rehabilitation exercises for your condition. Start each exercise slowly. Ease off the exercise if you start to have pain. Your doctor or physical therapist will tell you when you can start these exercises and which ones will work best for you. When you are not being active, find a comfortable position for rest. Some people are comfortable on the floor or a medium-firm bed with a small pillow under their head and another under their knees. Some people prefer to lie on their side with a pillow between their knees. Don't stay in one position for too long. Take short walks (10 to 20 minutes) every 2 to 3 hours. Avoid slopes, hills, and stairs until you feel better. Walk only distances you can manage without pain, especially leg pain. How to do the exercises Pelvic tilt 1. Lie on your back with your knees bent. 2. \"Brace\" your stomach-tighten your muscles by pulling in and imagining your belly button moving toward your spine. 3. Press your lower back into the floor. You should feel your hips and pelvis rock back. 4. Hold for 6 seconds while breathing smoothly. 5. Relax and allow your pelvis and hips to rock forward. 6. Repeat 8 to 12 times. Back stretches 1. Get down on your hands and knees on the floor. 2. Relax your head and allow it to droop.  Round your back up toward the ceiling until you feel a nice stretch in your upper, middle, and lower back. Hold this stretch for as long as it feels comfortable, or about 15 to 30 seconds. 3. Return to the starting position with a flat back while you are on your hands and knees. 4. Let your back sway by pressing your stomach toward the floor. Lift your buttocks toward the ceiling. 5. Hold this position for 15 to 30 seconds. 6. Repeat 2 to 4 times. Follow-up care is a key part of your treatment and safety. Be sure to make and go to all appointments, and call your doctor if you are having problems. It's also a good idea to know your test results and keep a list of the medicines you take. Where can you learn more? Go to http://slava-todd.info/. Enter G651 in the search box to learn more about \"Low Back Arthritis: Exercises. \" Current as of: March 21, 2017 Content Version: 11.4 © 8292-7974 [employer]. Care instructions adapted under license by [employer] (which disclaims liability or warranty for this information). If you have questions about a medical condition or this instruction, always ask your healthcare professional. [address] any warranty or liability for your use of this information. Trochanteric Bursitis: Exercises Your Care Instructions Here are some examples of typical rehabilitation exercises for your condition. Start each exercise slowly. Ease off the exercise if you start to have pain. Your doctor or physical therapist will tell you when you can start these exercises and which ones will work best for you. How to do the exercises Hamstring wall stretch 7. Lie on your back in a doorway, with your good leg through the open door. 8. Slide your affected leg up the wall to straighten your knee. You should feel a gentle stretch down the back of your leg. 1. Do not arch your back. 2. Do not bend either knee. 3. Keep one heel touching the floor and the other heel touching the wall. Do not point your toes. 9. Hold the stretch for at least 1 minute to begin. Then try to lengthen the time you hold the stretch to as long as 6 minutes. 10. Repeat 2 to 4 times. 11. If you do not have a place to do this exercise in a doorway, there is another way to do it: 
12. Lie on your back, and bend the knee of your affected leg. 13. Loop a towel under the ball and toes of that foot, and hold the ends of the towel in your hands. 14. Straighten your knee, and slowly pull back on the towel. You should feel a gentle stretch down the back of your leg. 15. Hold the stretch for 15 to 30 seconds. Or even better, hold the stretch for 1 minute if you can. 16. Repeat 2 to 4 times. Straight-leg raises to the outside 7. Lie on your side, with your affected leg on top. 8. Tighten the front thigh muscles of your top leg to keep your knee straight. 9. Keep your hip and your leg straight in line with the rest of your body, and keep your knee pointing forward. Do not drop your hip back. 10. Lift your top leg straight up toward the ceiling, about 12 inches off the floor. Hold for about 6 seconds, then slowly lower your leg. 11. Repeat 8 to 12 times. Clamshell 1. Lie on your side, with your affected leg on top and your head propped on a pillow. Keep your feet and knees together and your knees bent. 2. Raise your top knee, but keep your feet together. Do not let your hips roll back. Your legs should open up like a clamshell. 3. Hold for 6 seconds. 4. Slowly lower your knee back down. Rest for 10 seconds. 5. Repeat 8 to 12 times. Standing quadriceps stretch 1. If you are not steady on your feet, hold on to a chair, counter, or wall. You can also lie on your stomach or your side to do this exercise.  
2. Bend the knee of the leg you want to stretch, and reach behind you to grab the front of your foot or ankle with the hand on the same side. For example, if you are stretching your right leg, use your right hand. 3. Keeping your knees next to each other, pull your foot toward your buttock until you feel a gentle stretch across the front of your hip and down the front of your thigh. Your knee should be pointed directly to the ground, and not out to the side. 4. Hold the stretch for 15 to 30 seconds. 5. Repeat 2 to 4 times. Piriformis stretch 1. Lie on your back with your legs straight. 2. Lift your affected leg and bend your knee. With your opposite hand, reach across your body, and then gently pull your knee toward your opposite shoulder. 3. Hold the stretch for 15 to 30 seconds. 4. Repeat 2 to 4 times. Double knee-to-chest 
 
1. Lie on your back with your knees bent and your feet flat on the floor. You can put a small pillow under your head and neck if it is more comfortable. 2. Bring both knees to your chest. 
3. Keep your lower back pressed to the floor. Hold for 15 to 30 seconds. 4. Relax, and lower your knees to the starting position. 5. Repeat 2 to 4 times. Follow-up care is a key part of your treatment and safety. Be sure to make and go to all appointments, and call your doctor if you are having problems. It's also a good idea to know your test results and keep a list of the medicines you take. Where can you learn more? Go to http://slava-todd.info/. Enter H643 in the search box to learn more about \"Trochanteric Bursitis: Exercises. \" Current as of: March 21, 2017 Content Version: 11.4 © 7190-2630 Healthwise[employer] Incorporated. Care instructions adapted under license by [employer] (which disclaims liability or warranty for this information).  If you have questions about a medical condition or this instruction, always ask your healthcare professional. [address] any warranty or liability for your use of this information. Introducing [facility]! [employer] introduces [employer] patient portal. Now you can access parts of your medical record, email your doctor's office, and request medication refills online. 1. In your internet browser, go to https://[employer]. [employer] 2. Click on the First Time User? Click Here link in the Sign In box. You will see the New Member Sign Up page. 3. Enter your [employer] Access Code exactly as it appears below. You will not need to use this code after youve completed the sign-up process. If you do not sign up before the expiration date, you must request a new code. · [employer] Access Code: [email or web address] Expires: 9/9/2018  1:34 PM 
 
4. Enter the last four digits of your Social Security Number (xxxx) and Date of Birth (mm/dd/yyyy) as indicated and click Submit. You will be taken to the next sign-up page. 5. Create a [employer] ID. This will be your [employer] login ID and cannot be changed, so think of one that is secure and easy to remember. 6. Create a [employer] password. You can change your password at any time. 7. Enter your Password Reset Question and Answer. This can be used at a later time if you forget your password. 8. Enter your e-mail address. You will receive e-mail notification when new information is available in [address] 9. Click Sign Up. You can now view and download portions of your medical record. 10. Click the Download Summary menu link to download a portable copy of your medical information. If you have questions, please visit the Frequently Asked Questions section of the [employer] website. Remember, [employer] is NOT to be used for urgent needs. For medical emergencies, dial 911. Now available from your iPhone and Android! Please provide this summary of care documentation to your next provider. Your primary care clinician is listed as Justyn Stuart.  If you have any questions after today's visit, please call [phone].

## 2018-06-11 NOTE — PATIENT INSTRUCTIONS
Low Back Arthritis: Exercises  Your Care Instructions  Here are some examples of typical rehabilitation exercises for your condition. Start each exercise slowly. Ease off the exercise if you start to have pain. Your doctor or physical therapist will tell you when you can start these exercises and which ones will work best for you. When you are not being active, find a comfortable position for rest. Some people are comfortable on the floor or a medium-firm bed with a small pillow under their head and another under their knees. Some people prefer to lie on their side with a pillow between their knees. Don't stay in one position for too long. Take short walks (10 to 20 minutes) every 2 to 3 hours. Avoid slopes, hills, and stairs until you feel better. Walk only distances you can manage without pain, especially leg pain. How to do the exercises  Pelvic tilt    1. Lie on your back with your knees bent. 2. \"Brace\" your stomach-tighten your muscles by pulling in and imagining your belly button moving toward your spine. 3. Press your lower back into the floor. You should feel your hips and pelvis rock back. 4. Hold for 6 seconds while breathing smoothly. 5. Relax and allow your pelvis and hips to rock forward. 6. Repeat 8 to 12 times. Back stretches    1. Get down on your hands and knees on the floor. 2. Relax your head and allow it to droop. Round your back up toward the ceiling until you feel a nice stretch in your upper, middle, and lower back. Hold this stretch for as long as it feels comfortable, or about 15 to 30 seconds. 3. Return to the starting position with a flat back while you are on your hands and knees. 4. Let your back sway by pressing your stomach toward the floor. Lift your buttocks toward the ceiling. 5. Hold this position for 15 to 30 seconds. 6. Repeat 2 to 4 times. Follow-up care is a key part of your treatment and safety.  Be sure to make and go to all appointments, and call your doctor if you are having problems. It's also a good idea to know your test results and keep a list of the medicines you take. Where can you learn more? Go to http://slava-todd.info/. Enter C136 in the search box to learn more about \"Low Back Arthritis: Exercises. \"  Current as of: March 21, 2017  Content Version: 11.4  © 3746-6507 [employer]. Care instructions adapted under license by [employer] (which disclaims liability or warranty for this information). If you have questions about a medical condition or this instruction, always ask your healthcare professional. [place] [address] any warranty or liability for your use of this information. Trochanteric Bursitis: Exercises  Your Care Instructions  Here are some examples of typical rehabilitation exercises for your condition. Start each exercise slowly. Ease off the exercise if you start to have pain. Your doctor or physical therapist will tell you when you can start these exercises and which ones will work best for you. How to do the exercises  Hamstring wall stretch    7. Lie on your back in a doorway, with your good leg through the open door. 8. Slide your affected leg up the wall to straighten your knee. You should feel a gentle stretch down the back of your leg. 1. Do not arch your back. 2. Do not bend either knee. 3. Keep one heel touching the floor and the other heel touching the wall. Do not point your toes. 9. Hold the stretch for at least 1 minute to begin. Then try to lengthen the time you hold the stretch to as long as 6 minutes. 10. Repeat 2 to 4 times. 11. If you do not have a place to do this exercise in a doorway, there is another way to do it:  12. Lie on your back, and bend the knee of your affected leg. 13. Loop a towel under the ball and toes of that foot, and hold the ends of the towel in your hands. 14. Straighten your knee, and slowly pull back on the towel.  You should feel a gentle stretch down the back of your leg. 15. Hold the stretch for 15 to 30 seconds. Or even better, hold the stretch for 1 minute if you can. 16. Repeat 2 to 4 times. Straight-leg raises to the outside    7. Lie on your side, with your affected leg on top. 8. Tighten the front thigh muscles of your top leg to keep your knee straight. 9. Keep your hip and your leg straight in line with the rest of your body, and keep your knee pointing forward. Do not drop your hip back. 10. Lift your top leg straight up toward the ceiling, about 12 inches off the floor. Hold for about 6 seconds, then slowly lower your leg. 11. Repeat 8 to 12 times. Clamshell    1. Lie on your side, with your affected leg on top and your head propped on a pillow. Keep your feet and knees together and your knees bent. 2. Raise your top knee, but keep your feet together. Do not let your hips roll back. Your legs should open up like a clamshell. 3. Hold for 6 seconds. 4. Slowly lower your knee back down. Rest for 10 seconds. 5. Repeat 8 to 12 times. Standing quadriceps stretch    1. If you are not steady on your feet, hold on to a chair, counter, or wall. You can also lie on your stomach or your side to do this exercise. 2. Bend the knee of the leg you want to stretch, and reach behind you to grab the front of your foot or ankle with the hand on the same side. For example, if you are stretching your right leg, use your right hand. 3. Keeping your knees next to each other, pull your foot toward your buttock until you feel a gentle stretch across the front of your hip and down the front of your thigh. Your knee should be pointed directly to the ground, and not out to the side. 4. Hold the stretch for 15 to 30 seconds. 5. Repeat 2 to 4 times. Piriformis stretch    1. Lie on your back with your legs straight. 2. Lift your affected leg and bend your knee.  With your opposite hand, reach across your body, and then gently pull your knee toward your opposite shoulder. 3. Hold the stretch for 15 to 30 seconds. 4. Repeat 2 to 4 times. Double knee-to-chest    1. Lie on your back with your knees bent and your feet flat on the floor. You can put a small pillow under your head and neck if it is more comfortable. 2. Bring both knees to your chest.  3. Keep your lower back pressed to the floor. Hold for 15 to 30 seconds. 4. Relax, and lower your knees to the starting position. 5. Repeat 2 to 4 times. Follow-up care is a key part of your treatment and safety. Be sure to make and go to all appointments, and call your doctor if you are having problems. It's also a good idea to know your test results and keep a list of the medicines you take. Where can you learn more? Go to http://slava-todd.info/. Enter Y790 in the search box to learn more about \"Trochanteric Bursitis: Exercises. \"  Current as of: March 21, 2017  Content Version: 11.4  © 1719-5863 Healthwise, Incorporated. Care instructions adapted under license by [employer] (which disclaims liability or warranty for this information). If you have questions about a medical condition or this instruction, always ask your healthcare professional. [address] any warranty or liability for your use of this information.

## 2018-09-11 ENCOUNTER — OFFICE VISIT (OUTPATIENT)
Dept: ORTHOPEDIC SURGERY | Age: 73
End: 2018-09-11

## 2018-09-11 VITALS
SYSTOLIC BLOOD PRESSURE: 124 MMHG | WEIGHT: 211 LBS | HEIGHT: 70 IN | BODY MASS INDEX: 30.21 KG/M2 | DIASTOLIC BLOOD PRESSURE: 73 MMHG | HEART RATE: 71 BPM | OXYGEN SATURATION: 96 % | TEMPERATURE: 97.8 F | RESPIRATION RATE: 16 BRPM

## 2018-09-11 DIAGNOSIS — H54.8 LEGALLY BLIND: ICD-10-CM

## 2018-09-11 DIAGNOSIS — M48.062 SPINAL STENOSIS OF LUMBAR REGION WITH NEUROGENIC CLAUDICATION: ICD-10-CM

## 2018-09-11 DIAGNOSIS — G47.9 SLEEP DISORDER: ICD-10-CM

## 2018-09-11 DIAGNOSIS — M47.816 LUMBAR SPONDYLOSIS: ICD-10-CM

## 2018-09-11 DIAGNOSIS — Z79.891 USE OF OPIATES FOR THERAPEUTIC PURPOSES: ICD-10-CM

## 2018-09-11 DIAGNOSIS — G89.29 OTHER CHRONIC PAIN: Primary | ICD-10-CM

## 2018-09-11 RX ORDER — HYDROCODONE BITARTRATE AND ACETAMINOPHEN 10; 325 MG/1; MG/1
1 TABLET ORAL
Qty: 90 TAB | Refills: 0 | Status: CANCELLED | OUTPATIENT
Start: 2018-11-10

## 2018-09-11 RX ORDER — HYDROCODONE BITARTRATE AND ACETAMINOPHEN 10; 325 MG/1; MG/1
1 TABLET ORAL
Qty: 90 TAB | Refills: 0 | Status: CANCELLED | OUTPATIENT
Start: 2018-09-11

## 2018-09-11 RX ORDER — ACETAMINOPHEN AND CODEINE PHOSPHATE 300; 30 MG/1; MG/1
TABLET ORAL
Qty: 60 TAB | Refills: 2 | Status: SHIPPED | OUTPATIENT
Start: 2018-09-11 | End: 2018-12-12 | Stop reason: SDUPTHER

## 2018-09-11 RX ORDER — HYDROCODONE BITARTRATE AND ACETAMINOPHEN 10; 325 MG/1; MG/1
1 TABLET ORAL
Qty: 90 TAB | Refills: 0 | Status: CANCELLED | OUTPATIENT
Start: 2018-10-11

## 2018-09-11 RX ORDER — ACETAMINOPHEN AND CODEINE PHOSPHATE 300; 30 MG/1; MG/1
1 TABLET ORAL
COMMUNITY
End: 2018-12-12 | Stop reason: SDUPTHER

## 2018-09-11 NOTE — MR AVS SNAPSHOT
303 [facility] [address] [facility] [address] 
[phone] Patient: Caryn Delacruz MRN: [id number] Visit Information Date & Time Provider Department Dept. Phone Encounter #  
 2018 11:30 AM Paola Webb, 27 [facility] [phone] Follow-up Instructions Return in about 3 months (around 2018) for Medication follow up.  
  
 2018  9:30 AM  
Any with Shashi Miller MD  
[facility]) Appt Note: Return in about 1 year (around 2018) for Prostate CA, PSA prior. [address] [place] [address]  
[phone]  
  
   
 [place] [id number] Upcoming Health Maintenance Date Due DTaP/Tdap/Td series (1 - Tdap) 1964 FOBT Q 1 YEAR AGE 50-75 1993 ZOSTER VACCINE AGE 60> 2003 GLAUCOMA SCREENING Q2Y 2008 Pneumococcal 65+ High/Highest Risk (2 of 2 - PPSV23) 2017 MEDICARE YEARLY EXAM 3/14/2018 Influenza Age 5 to Adult 2018 Allergies as of 2018  Review Complete On: 2018 By: Paola Webb MD  
  
 Severity Noted Reaction Type Reactions Contrast Agent [Iodine]  1995    Hives Current Immunizations  Never Reviewed No immunizations on file. Not reviewed this visit You Were Diagnosed With   
  
 Codes Comments Other chronic pain    -  Primary ICD-10-CM: G89.29 ICD-9-CM: 338.29 Use of opiates for therapeutic purposes     ICD-10-CM: Z79.891 ICD-9-CM: V58.69 Lumbar spondylosis     ICD-10-CM: M47.816 ICD-9-CM: 721.3 Spinal stenosis of lumbar region with neurogenic claudication     ICD-10-CM: M48.062 
ICD-9-CM: 724.03 Visual impairment     ICD-10-CM: H54.7 ICD-9-CM: 369.9 Vitals BP Pulse Temp Resp Height(growth percentile) Weight(growth percentile) 124/73 (BP 1 Location: Left arm, BP Patient Position: Sitting) 71 97.8 °F (36.6 °C) (Oral) 16 5' 10\" (1.778 m) 211 lb (95.7 kg) SpO2 BMI Smoking Status 96% 30.28 kg/m2 Never Smoker BMI and BSA Data Body Mass Index Body Surface Area  
 30.28 kg/m 2 2.17 m 2 Preferred Pharmacy Pharmacy Name Phone [address] [facility] [address], [facility] [address] [facility] [address] [phone] Your Updated Medication List  
  
   
This list is accurate as of 9/11/18 12:42 PM.  Always use your most recent med list.  
  
  
  
  
 * ADALAT CC PO Take 60 mg by mouth daily. * AFEDITAB CR 60 mg ER tablet Generic drug:  NIFEdipine ER  
  
 ALLOPURINOL PO Take 300 mg by mouth daily. aspirin delayed-release 81 mg tablet Take  by mouth daily. diclofenac 1 % Gel Commonly known as:  VOLTAREN Apply  to affected area four (4) times daily. Apply to right hip 4 times per day as directed DULoxetine 30 mg capsule Commonly known as:  CYMBALTA Take 30 mg by mouth daily. FLONASE 50 mcg/actuation nasal spray Generic drug:  fluticasone 2 Sprays by Both Nostrils route daily. * HYDROcodone-acetaminophen  mg tablet Commonly known as:  Lindsey Barrs Take 1 Tab by mouth three (3) times daily as needed. Max Daily Amount: 3 Tabs. Indications: Chronic Pain G89.29, DNF until start date. * HYDROcodone-acetaminophen  mg tablet Commonly known as:  Lindsey Barrs Take 1 Tab by mouth three (3) times daily as needed. Max Daily Amount: 3 Tabs. Indications: Chronic Pain G89.29, DNF until start date. * HYDROcodone-acetaminophen  mg tablet Commonly known as:  Lindsey Barrs Take 1 Tab by mouth three (3) times daily as needed. Max Daily Amount: 3 Tabs. Indications: Chronic Pain G89.29, DNF until start date. hydrocortisone 2.5 % rectal cream  
Commonly known as:  ANUSOL-HC Insert  into rectum two (2) times a day. Use as needed as directed MENTAX 1 % topical cream  
Generic drug:  butenafine Mentax 1 % External Cream APPLY SPARINGLY TO AFFECTED AREA(S) ONCE DAILY  Quantity: 60    Any Shoulders M.D.;  Started 2009 Active  
  
 polyethylene glycol 17 gram/dose powder Commonly known as:  Hennikhil Milka Take 17 g by mouth daily as needed. SPIRONOLACTONE PO Take 25 mg by mouth two (2) times a day. TOPAMAX PO Take 100 mg by mouth two (2) times a day. Indications: TREMORS  
  
 * TYLENOL-CODEINE #3 300-30 mg per tablet Generic drug:  acetaminophen-codeine Take 1 Tab by mouth every four (4) hours as needed for Pain. * acetaminophen-codeine 300-30 mg per tablet Commonly known as:  TYLENOL #3  
1 po bid as needed for severe chronic pain  Indications: Pain VITAMIN D2 50,000 unit capsule Generic drug:  ergocalciferol Take 50,000 Units by mouth. * Notice: This list has 7 medication(s) that are the same as other medications prescribed for you. Read the directions carefully, and ask your doctor or other care provider to review them with you. Prescriptions Printed Refills  
 acetaminophen-codeine (TYLENOL #3) 300-30 mg per tablet 2 Si po bid as needed for severe chronic pain  Indications: Pain Class: Print Follow-up Instructions Return in about 3 months (around 2018) for Medication follow up. To-Do List   
 2019 8:00 AM  
  Appointment with [address] [place] [address] RADIATION THERAPY ([phone]) ATTENTION: The Appointment Time in [address] may not reflect your scheduled appointment time as the time is only a place mg. If you have any questions about your appointment time, please call [facility] at [phone]. Patient Instructions Low Back Arthritis: Exercises Your Care Instructions Here are some examples of typical rehabilitation exercises for your condition. Start each exercise slowly. Ease off the exercise if you start to have pain. Your doctor or physical therapist will tell you when you can start these exercises and which ones will work best for you. When you are not being active, find a comfortable position for rest. Some people are comfortable on the floor or a medium-firm bed with a small pillow under their head and another under their knees. Some people prefer to lie on their side with a pillow between their knees. Don't stay in one position for too long. Take short walks (10 to 20 minutes) every 2 to 3 hours. Avoid slopes, hills, and stairs until you feel better. Walk only distances you can manage without pain, especially leg pain. How to do the exercises Pelvic tilt 1. Lie on your back with your knees bent. 2. \"Brace\" your stomach-tighten your muscles by pulling in and imagining your belly button moving toward your spine. 3. Press your lower back into the floor. You should feel your hips and pelvis rock back. 4. Hold for 6 seconds while breathing smoothly. 5. Relax and allow your pelvis and hips to rock forward. 6. Repeat 8 to 12 times. Back stretches 1. Get down on your hands and knees on the floor. 2. Relax your head and allow it to droop. Round your back up toward the ceiling until you feel a nice stretch in your upper, middle, and lower back. Hold this stretch for as long as it feels comfortable, or about 15 to 30 seconds. 3. Return to the starting position with a flat back while you are on your hands and knees. 4. Let your back sway by pressing your stomach toward the floor. Lift your buttocks toward the ceiling. 5. Hold this position for 15 to 30 seconds. 6. Repeat 2 to 4 times. Follow-up care is a key part of your treatment and safety. Be sure to make and go to all appointments, and call your doctor if you are having problems. It's also a good idea to know your test results and keep a list of the medicines you take. Where can you learn more? Go to http://slava-todd.info/. Enter T306 in the search box to learn more about \"Low Back Arthritis: Exercises. \" Current as of: November 29, 2017 Content Version: 11.7 © 8789-7075 [employer]. Care instructions adapted under license by [employer] (which disclaims liability or warranty for this information). If you have questions about a medical condition or this instruction, always ask your healthcare professional. [address] any warranty or liability for your use of this information. Introducing [facility] & HEALTH SERVICES! [facility] introduces [employer] patient portal. Now you can access parts of your medical record, email your doctor's office, and request medication refills online. 1. In your internet browser, go to [email or web address] 2. Click on the First Time User? Click Here link in the Sign In box. You will see the New Member Sign Up page. 3. Enter your [employer] Access Code exactly as it appears below. You will not need to use this code after youve completed the sign-up process. If you do not sign up before the expiration date, you must request a new code. · [employer] Access Code: [email or web address] Expires: 12/10/2018 12:39 PM 
 
4. Enter the last four digits of your Social Security Number (xxxx) and Date of Birth (mm/dd/yyyy) as indicated and click Submit. You will be taken to the next sign-up page. 5. Create a [employer] ID. This will be your [employer] login ID and cannot be changed, so think of one that is secure and easy to remember. 6. Create a [employer] password. You can change your password at any time. 7. Enter your Password Reset Question and Answer. This can be used at a later time if you forget your password. 8. Enter your e-mail address. You will receive e-mail notification when new information is available in [address] 9. Click Sign Up. You can now view and download portions of your medical record. 10. Click the Download Summary menu link to download a portable copy of your medical information. If you have questions, please visit the Frequently Asked Questions section of the [employer] ([employer]) website. Remember, [employer] ([employer]) is NOT to be used for urgent needs. For medical emergencies, dial 911. Now available from your iPhone and Android! Please provide this summary of care documentation to your next provider. Your primary care clinician is listed as Dixon Bean. If you have any questions after today's visit, please call [phone].
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
## 2018-11-30 ENCOUNTER — HOSPITAL ENCOUNTER (OUTPATIENT)
Dept: LAB | Age: 73
Discharge: HOME | End: 2018-11-30
Payer: MEDICARE

## 2018-11-30 DIAGNOSIS — C61 PROSTATE CANCER (HCC): ICD-10-CM

## 2018-11-30 LAB — PSA SERPL-MCNC: 0.4 NG/ML (ref 0–4)

## 2018-11-30 PROCEDURE — 84153 ASSAY OF PSA TOTAL: CPT

## 2018-11-30 PROCEDURE — 36415 COLL VENOUS BLD VENIPUNCTURE: CPT

## 2018-12-12 ENCOUNTER — OFFICE VISIT (OUTPATIENT)
Dept: ORTHOPEDIC SURGERY | Age: 73
End: 2018-12-12

## 2018-12-12 VITALS
RESPIRATION RATE: 16 BRPM | WEIGHT: 212.5 LBS | HEART RATE: 82 BPM | TEMPERATURE: 98.1 F | BODY MASS INDEX: 31.47 KG/M2 | OXYGEN SATURATION: 98 % | DIASTOLIC BLOOD PRESSURE: 62 MMHG | HEIGHT: 69 IN | SYSTOLIC BLOOD PRESSURE: 126 MMHG

## 2018-12-12 DIAGNOSIS — G89.29 OTHER CHRONIC PAIN: ICD-10-CM

## 2018-12-12 DIAGNOSIS — R29.898 WEAKNESS OF BOTH LEGS: ICD-10-CM

## 2018-12-12 DIAGNOSIS — M48.062 SPINAL STENOSIS OF LUMBAR REGION WITH NEUROGENIC CLAUDICATION: ICD-10-CM

## 2018-12-12 DIAGNOSIS — M47.816 LUMBAR SPONDYLOSIS: ICD-10-CM

## 2018-12-12 DIAGNOSIS — H54.8 LEGALLY BLIND: ICD-10-CM

## 2018-12-12 DIAGNOSIS — Z79.891 USE OF OPIATES FOR THERAPEUTIC PURPOSES: ICD-10-CM

## 2018-12-12 DIAGNOSIS — G89.29 OTHER CHRONIC PAIN: Primary | ICD-10-CM

## 2018-12-12 RX ORDER — ACETAMINOPHEN AND CODEINE PHOSPHATE 300; 30 MG/1; MG/1
TABLET ORAL
Qty: 60 TAB | Refills: 2 | Status: SHIPPED | OUTPATIENT
Start: 2018-12-12 | End: 2019-03-26 | Stop reason: SDUPTHER

## 2018-12-12 NOTE — PROGRESS NOTES
[facility]  Denise Hanna., [address] [place] [address], [facility] [address]  Phone: [phone]  Fax: [phone]    Pt's YOB: 1945    ASSESSMENT   Diagnoses and all orders for this visit:    1. Other chronic pain  -     acetaminophen-codeine (TYLENOL #3) 300-30 mg per tablet; 1 po bid as needed for severe chronic pain  -     DRUG SCREEN UR - W/ CONFIRM; Future    2. Use of opiates for therapeutic purposes  -     acetaminophen-codeine (TYLENOL #3) 300-30 mg per tablet; 1 po bid as needed for severe chronic pain  -     DRUG SCREEN UR - W/ CONFIRM; Future    3. Lumbar spondylosis  -     acetaminophen-codeine (TYLENOL #3) 300-30 mg per tablet; 1 po bid as needed for severe chronic pain  -     DRUG SCREEN UR - W/ CONFIRM; Future  -     MRI LUMB SPINE W WO CONT; Future    4. Spinal stenosis of lumbar region with neurogenic claudication  -     MRI LUMB SPINE W WO CONT; Future    5. Weakness of both legs  -     MRI LUMB SPINE W WO CONT; Future    6. Legally blind  -     MRI LUMB SPINE W WO CONT; Future         IMPRESSION AND PLAN:  Fern Childers is a 74 y.o. male with history of chronic lumbar pain. Pt complains of 7-8/10 lower back pain. He notes that he fell in 10/2018 and states that his legs gave way on 11/23/2018. Pt has been prescribed Tylenol #3 and takes 1-3 tabs daily as his pain warrants. 1) Pt was given information on lumbar arthritis exercises. 2) He received a refill of Tylenol #3 1 tab BID prn severe chronic pain. 3) A UDS was obtained. 4) A lumbar MRI was ordered. He has progressive lower back pain, bilateral leg weakness, and difficulty standing/walking more than 5 minutes. [facility] 5) Mr. Ashley Lane has a reminder for a \"due or due soon\" health maintenance. I have asked that he contact his primary care provider, Reed Cristobal MD, for follow-up on this health maintenance. 6)  demonstrated consistency with prescribing.    7) Last UDS from 06/11/2018 was consistent. 8) Pt will follow-up in 6 weeks or sooner if needed. HISTORY OF PRESENT ILLNESS:  Jaqueline Wilcox is a 74 y.o. male with history of chronic lumbar pain and presents to the office today for follow up. Pt complains of 7-8/10 lower back pain. He notes that he fell in 10/2018, is unable to recall how/why he fell, and states that he did not lose consciousness with the fall. Pt notes that his home help arrived about 30 minutes later but he was able to get off the floor by himself. He reports one incident where he lost all strength in both legs on 11/23/2018. Pt notes that a family member was trying to see how far he could walk on his own but his legs gave out/felt exteremly heavy after about 5 minutes of ambulation. He states that he took a break to sit down and then his cousin came in his truck to pick him up. Pt notes increased lower back pain lasting 2 days after the incident. He admits that he only had a cane with him since he was out of town and visiting his family for Thanksgiving. Of note, he went to [place], [place] for Thanksgiving to be with family and took a train. He last followed up with his PCP, Huy Velez MD, prior to his fall/legs giving way. Of note, he had prior lumbar surgery in 1982. Pt has been prescribed Tylenol #3 and takes 1-3 tabs daily as his pain warrants. Of note, patient's MME/day is 4.5-9. He admits to constipation and notes this is well managed with Miralax. Pt at this time desires to proceed with a lumbar MRI.     Pain Scale: 8/10    PCP: Huy Velez MD     Past Medical History:   Diagnosis Date    Allergy     Anxiety     ED (erectile dysfunction)     Elevated prostate specific antigen (PSA)     Essential hypertension     Essential tremor     Gout     Hypogonadism in male     Hypotestosteronism     Ill-defined condition     ESSENTIAL TREMORS    Legally blind     Nocturia     Nodular prostate without urinary obstruction  Panic attack     Prostate cancer ([facility] [address] 2/14/13    PNBx-T2a - Prostatic Volume: 26.7 grams - [place] 6 (3 + 3) -  Dr. Ernesto Dallas Thyroid disease     small tumor        Social History     Socioeconomic History    Marital status:      Spouse name: Not on file    Number of children: Not on file    Years of education: Not on file    Highest education level: Not on file   Social Needs    Financial resource strain: Not on file    Food insecurity - worry: Not on file    Food insecurity - inability: Not on file    Transportation needs - medical: Not on file   [email or web address] needs - non-medical: Not on file   Occupational History    Not on file   Tobacco Use    Smoking status: Never Smoker    Smokeless tobacco: Never Used   Substance and Sexual Activity    Alcohol use: Yes     Comment: seldom    Drug use: No    Sexual activity: No   Other Topics Concern    Not on file   Social History Narrative    Not on file       Current Outpatient Medications   Medication Sig Dispense Refill    acetaminophen-codeine (TYLENOL #3) 300-30 mg per tablet 1 po bid as needed for severe chronic pain 60 Tab 2    alprostadil (MUSE) 500 mcg supp 500 mcg by Urethral route as needed. 6 Suppository 1    AFEDITAB CR 60 mg ER tablet       diclofenac (VOLTAREN) 1 % gel Apply  to affected area four (4) times daily. Apply to right hip 4 times per day as directed 5 Each 3    ergocalciferol (VITAMIN D2) 50,000 unit capsule Take 50,000 Units by mouth.  polyethylene glycol (MIRALAX) 17 gram/dose powder Take 17 g by mouth daily as needed. 510 g 5    hydrocortisone (ANUSOL-HC) 2.5 % rectal cream Insert  into rectum two (2) times a day.  Use as needed as directed 90 g 1    butenafine (MENTAX) 1 % topical cream Mentax 1 % External Cream  APPLY SPARINGLY TO AFFECTED AREA(S) ONCE DAILY   Quantity: 60       Chestine Ellis M.D.;  Started 12-Feb-2009  Active      fluticasone (FLONASE) 50 mcg/actuation nasal spray 2 Sprays by Both Nostrils route daily.  DULoxetine (CYMBALTA) 30 mg capsule Take 30 mg by mouth daily.  aspirin delayed-release 81 mg tablet Take  by mouth daily.  NIFEDIPINE (ADALAT CC PO) Take 60 mg by mouth daily.  SPIRONOLACTONE PO Take 25 mg by mouth two (2) times a day.  ALLOPURINOL PO Take 300 mg by mouth daily.  TOPIRAMATE (TOPAMAX PO) Take 100 mg by mouth two (2) times a day. Indications: TREMORS         Allergies   Allergen Reactions    Contrast Agent [Iodine] Hives    Iodinated Contrast- Oral And Iv Dye Itching         REVIEW OF SYSTEMS    Constitutional: Negative for fever, chills, or weight change. Respiratory: Negative for cough or shortness of breath. Cardiovascular: Negative for chest pain or palpitations. Gastrointestinal: Negative for acid reflux or change in bowel habits. Positive for constipation. Genitourinary: Negative for dysuria and flank pain. Musculoskeletal: Positive for lumbar pain and leg weakness. Skin: Negative for rash. Neurological: Negative for headaches, dizziness, or numbness. Endo/Heme/Allergies: Negative for increased bruising. Psychiatric/Behavioral: Negative for difficulty with sleep. PHYSICAL EXAMINATION  Visit Vitals  /62   Pulse 82   Temp 98.1 °F (36.7 °C) (Oral)   Resp 16   Ht 5' 9\" (1.753 m)   Wt 212 lb 8 oz (96.4 kg)   SpO2 98%   BMI 31.38 kg/m²       Constitutional: Awake, alert, and in no acute distress. Neurological: 1+ symmetrical DTRs in the lower extremities. Sensation to light touch is intact. Skin: warm, dry, and intact. Musculoskeletal: Tenderness to palpation in the lower lumbar region. Moderate pain with extension and axial loading. No pain with internal or external rotation of his hips. Negative straight leg raise bilaterally. Patient ambulates with the assistance of a rolling walker.      Hip Flex Quads Hamstrings Ankle DF EHL Ankle PF   Right 4/5 4/5 4/5 4/5 4/5 4/5   Left 4/5 4/5 4/5 4/5 4/5 4/5 IMAGING:    Lumbar spine MRI from 5/12/2015 was personally reviewed with the Pt and demonstrated:  IMPRESSION:  1. Partially evaluated complex left renal lesions. Recommend further evaluation with an ultrasound and/or contrast enhanced multiphase CT. 2. Multilevel degenerative disc disease and facet arthropathy with scoliotic deformity. This cause various degrees of spinal canal and neuroforaminal narrowing.    3. There is mild lumbar dextroscoliosis. Vertebral body height and alignment are normal. There is moderate disc space narrowing and osteophytosis from L1-L2 through L5-S1. There is facet arthropathy in the lower lumbar spine. Prostatic seed implants are noted. Written by Alessandro Epstein, as dictated by Inderjit Chung MD.  I, Dr. Inderjit Chung confirm that all documentation is accurate.

## 2018-12-12 NOTE — PATIENT INSTRUCTIONS
Low Back Arthritis: Exercises  Your Care Instructions  Here are some examples of typical rehabilitation exercises for your condition. Start each exercise slowly. Ease off the exercise if you start to have pain. Your doctor or physical therapist will tell you when you can start these exercises and which ones will work best for you. When you are not being active, find a comfortable position for rest. Some people are comfortable on the floor or a medium-firm bed with a small pillow under their head and another under their knees. Some people prefer to lie on their side with a pillow between their knees. Don't stay in one position for too long. Take short walks (10 to 20 minutes) every 2 to 3 hours. Avoid slopes, hills, and stairs until you feel better. Walk only distances you can manage without pain, especially leg pain. How to do the exercises  Pelvic tilt    1. Lie on your back with your knees bent. 2. \"Brace\" your stomachtighten your muscles by pulling in and imagining your belly button moving toward your spine. 3. Press your lower back into the floor. You should feel your hips and pelvis rock back. 4. Hold for 6 seconds while breathing smoothly. 5. Relax and allow your pelvis and hips to rock forward. 6. Repeat 8 to 12 times. Back stretches    1. Get down on your hands and knees on the floor. 2. Relax your head and allow it to droop. Round your back up toward the ceiling until you feel a nice stretch in your upper, middle, and lower back. Hold this stretch for as long as it feels comfortable, or about 15 to 30 seconds. 3. Return to the starting position with a flat back while you are on your hands and knees. 4. Let your back sway by pressing your stomach toward the floor. Lift your buttocks toward the ceiling. 5. Hold this position for 15 to 30 seconds. 6. Repeat 2 to 4 times. Follow-up care is a key part of your treatment and safety.  Be sure to make and go to all appointments, and call your doctor if you are having problems. It's also a good idea to know your test results and keep a list of the medicines you take. Where can you learn more? Go to http://slava-todd.info/. Enter Q353 in the search box to learn more about \"Low Back Arthritis: Exercises. \"  Current as of: November 29, 2017  Content Version: 11.8  © 8623-1126 [employer]. Care instructions adapted under license by [employer] (which disclaims liability or warranty for this information). If you have questions about a medical condition or this instruction, always ask your healthcare professional. [address] any warranty or liability for your use of this information.

## 2018-12-17 ENCOUNTER — HOSPITAL ENCOUNTER (OUTPATIENT)
Dept: MRI IMAGING | Age: 73
Discharge: HOME | End: 2018-12-17
Attending: PHYSICAL MEDICINE & REHABILITATION
Payer: MEDICARE

## 2018-12-17 VITALS — WEIGHT: 210 LBS | BODY MASS INDEX: 31.01 KG/M2

## 2018-12-17 DIAGNOSIS — H54.8 LEGALLY BLIND: ICD-10-CM

## 2018-12-17 DIAGNOSIS — M48.062 SPINAL STENOSIS OF LUMBAR REGION WITH NEUROGENIC CLAUDICATION: ICD-10-CM

## 2018-12-17 DIAGNOSIS — R29.898 WEAKNESS OF BOTH LEGS: ICD-10-CM

## 2018-12-17 DIAGNOSIS — M47.816 LUMBAR SPONDYLOSIS: ICD-10-CM

## 2018-12-17 LAB — CREAT UR-MCNC: 1.3 MG/DL (ref 0.6–1.3)

## 2018-12-17 PROCEDURE — 82565 ASSAY OF CREATININE: CPT

## 2018-12-17 PROCEDURE — A9575 INJ GADOTERATE MEGLUMI 0.1ML: HCPCS | Performed by: PHYSICAL MEDICINE & REHABILITATION

## 2018-12-17 PROCEDURE — 74011636320 HC RX REV CODE- 636/320: Performed by: PHYSICAL MEDICINE & REHABILITATION

## 2018-12-17 PROCEDURE — 72158 MRI LUMBAR SPINE W/O & W/DYE: CPT

## 2018-12-17 RX ADMIN — GADOTERATE MEGLUMINE 19 ML: 376.9 INJECTION INTRAVENOUS at 18:00

## 2019-01-29 ENCOUNTER — OFFICE VISIT (OUTPATIENT)
Dept: ORTHOPEDIC SURGERY | Age: 74
End: 2019-01-29

## 2019-01-29 VITALS
OXYGEN SATURATION: 99 % | RESPIRATION RATE: 14 BRPM | BODY MASS INDEX: 29.98 KG/M2 | HEART RATE: 64 BPM | HEIGHT: 70 IN | DIASTOLIC BLOOD PRESSURE: 74 MMHG | SYSTOLIC BLOOD PRESSURE: 145 MMHG | TEMPERATURE: 98 F | WEIGHT: 209.4 LBS

## 2019-01-29 DIAGNOSIS — M47.816 LUMBAR SPONDYLOSIS: ICD-10-CM

## 2019-01-29 DIAGNOSIS — Z79.891 USE OF OPIATES FOR THERAPEUTIC PURPOSES: ICD-10-CM

## 2019-01-29 DIAGNOSIS — G89.29 OTHER CHRONIC PAIN: ICD-10-CM

## 2019-01-29 DIAGNOSIS — M51.9 FORAMINAL STENOSIS DUE TO INTERVERTEBRAL DISC DISEASE: ICD-10-CM

## 2019-01-29 DIAGNOSIS — R29.898 WEAKNESS OF BOTH LEGS: Primary | ICD-10-CM

## 2019-01-29 DIAGNOSIS — M99.79 FORAMINAL STENOSIS DUE TO INTERVERTEBRAL DISC DISEASE: ICD-10-CM

## 2019-01-29 DIAGNOSIS — H54.8 LEGALLY BLIND: ICD-10-CM

## 2019-01-29 RX ORDER — ACETAMINOPHEN AND CODEINE PHOSPHATE 300; 30 MG/1; MG/1
TABLET ORAL
Qty: 60 TAB | Refills: 2 | Status: CANCELLED | OUTPATIENT
Start: 2019-03-12

## 2019-01-29 NOTE — PATIENT INSTRUCTIONS
Low Back Arthritis: Exercises Your Care Instructions Here are some examples of typical rehabilitation exercises for your condition. Start each exercise slowly. Ease off the exercise if you start to have pain. Your doctor or physical therapist will tell you when you can start these exercises and which ones will work best for you. When you are not being active, find a comfortable position for rest. Some people are comfortable on the floor or a medium-firm bed with a small pillow under their head and another under their knees. Some people prefer to lie on their side with a pillow between their knees. Don't stay in one position for too long. Take short walks (10 to 20 minutes) every 2 to 3 hours. Avoid slopes, hills, and stairs until you feel better. Walk only distances you can manage without pain, especially leg pain. How to do the exercises Pelvic tilt 1. Lie on your back with your knees bent. 2. \"Brace\" your stomachtighten your muscles by pulling in and imagining your belly button moving toward your spine. 3. Press your lower back into the floor. You should feel your hips and pelvis rock back. 4. Hold for 6 seconds while breathing smoothly. 5. Relax and allow your pelvis and hips to rock forward. 6. Repeat 8 to 12 times. Back stretches 1. Get down on your hands and knees on the floor. 2. Relax your head and allow it to droop. Round your back up toward the ceiling until you feel a nice stretch in your upper, middle, and lower back. Hold this stretch for as long as it feels comfortable, or about 15 to 30 seconds. 3. Return to the starting position with a flat back while you are on your hands and knees. 4. Let your back sway by pressing your stomach toward the floor. Lift your buttocks toward the ceiling. 5. Hold this position for 15 to 30 seconds. 6. Repeat 2 to 4 times. Follow-up care is a key part of your treatment and safety.  Be sure to make and go to all appointments, and call your doctor if you are having problems. It's also a good idea to know your test results and keep a list of the medicines you take. Where can you learn more? Go to http://slava-todd.info/. Enter D752 in the search box to learn more about \"Low Back Arthritis: Exercises. \" Current as of: September 20, 2018 Content Version: 11.9 © 5161-7337 [employer]. Care instructions adapted under license by [employer] (which disclaims liability or warranty for this information). If you have questions about a medical condition or this instruction, always ask your healthcare professional. [address] any warranty or liability for your use of this information.

## 2019-01-29 NOTE — PROGRESS NOTES
[address] 
[address] [place] Phone: [phone] Fax: [phone] Pt's YOB: 1945 ASSESSMENT Diagnoses and all orders for this visit: 
 
1. Weakness of both legs 
-     REFERRAL TO PHYSICAL THERAPY 2. Other chronic pain 
-     REFERRAL TO PHYSICAL THERAPY 3. Use of opiates for therapeutic purposes 4. Lumbar spondylosis -     REFERRAL TO PHYSICAL THERAPY 5. Legally blind 6. Foraminal stenosis due to intervertebral disc disease 
-     REFERRAL TO PHYSICAL THERAPY IMPRESSION AND PLAN: 
Benito Mccann is a 74 y.o. male with history of chronic lumbar pain. He complains of pain across the lower back with weakness in the legs with ambulation. Pt takes Tylenol #3 as his pain warrants, Topamax 100 mg 1 tab QAM and 2 tabs QHS, and Cymbalta 30 mg.  
 
 
1) Pt was given information on lumbar arthritis exercises. 2) Discussed treatment options with the Pt, including medication, physical therapy, and an L5 SNRB. 3) He received a refill of Tylenol #3 1 tab BID prn severe pain. 4) Pt was given printed orders for physical therapy-- discussed in home therapy and out patient therapy-- pt will need transportation to be set up for outpatient therapy and he will discuss this with his friend who has brought him today and make a decision about what Mr Noam Calderón prefers to do. 5) Mr. Noam Calderón has a reminder for a \"due or due soon\" health maintenance. I have asked that he contact his primary care provider, Lucia Smith MD, for follow-up on this health maintenance. 6)  demonstrated consistency with prescribing. 7) Last UDS from 12/12/2018 was consistent. 8) Pt will follow-up in 2 months or sooner if needed. 9) Consider further workup for vascular insufficiency pending response to therapy HISTORY OF PRESENT ILLNESS: 
Benito Mccann is a 74 y.o. male with history of chronic lumbar pain and presents to the office today for MRI follow up. He complains of pain across the lower back with weakness in the legs. Pt also complains of pain radiating both legs. He notes difficulty when ambulating 10 minutes or longer. Pt denies any pain or weakness in the arms/hands. He last completed physical therapy about 4 years ago. Pt reports temporary relief with previous steroid injections. He has been prescribed Tylenol #3 as his pain warrants. He continues to take Topamax 100 mg 1 tab QAM and 2 tabs QHS and Cymbalta 30 mg. Pt at this time desires to proceed with a referral to physical therapy. Of note, he is a nonsmoker and quit about 40 years ago. He notes that he will be getting a Life Alert system in the near future as ordered through the [place]. Pain Scale: 8/10 PCP: Jamel Rutherford MD 
  
Past Medical History:  
Diagnosis Date  Allergy  Anxiety  ED (erectile dysfunction)  Elevated prostate specific antigen (PSA)  Essential hypertension  Essential tremor  Gout  Hypogonadism in male  Hypotestosteronism  Ill-defined condition ESSENTIAL TREMORS  Legally blind  Nocturia  Nodular prostate without urinary obstruction  Panic attack  Prostate cancer ([facility] [address] 2/14/13 PNBx-T2a - Prostatic Volume: 26.7 grams - [place] 6 (3 + 3) -  Dr. Abby Tarango  Thyroid disease   
 small tumor Social History Socioeconomic History  Marital status:  Spouse name: Not on file  Number of children: Not on file  Years of education: Not on file  Highest education level: Not on file Social Needs  Financial resource strain: Not on file  Food insecurity - worry: Not on file  Food insecurity - inability: Not on file  Transportation needs - medical: Not on file  Transportation needs - non-medical: Not on file Occupational History  Not on file Tobacco Use  Smoking status: Never Smoker  Smokeless tobacco: Never Used Substance and Sexual Activity  Alcohol use: Yes Comment: seldom  Drug use: No  
 Sexual activity: No  
Other Topics Concern  Not on file Social History Narrative  Not on file Current Outpatient Medications Medication Sig Dispense Refill  acetaminophen-codeine (TYLENOL #3) 300-30 mg per tablet 1 po bid as needed for severe chronic pain 60 Tab 2  
 alprostadil (MUSE) 500 mcg supp 500 mcg by Urethral route as needed. 6 Suppository 1  
 AFEDITAB CR 60 mg ER tablet  diclofenac (VOLTAREN) 1 % gel Apply  to affected area four (4) times daily. Apply to right hip 4 times per day as directed 5 Each 3  
 ergocalciferol (VITAMIN D2) 50,000 unit capsule Take 50,000 Units by mouth.  polyethylene glycol (MIRALAX) 17 gram/dose powder Take 17 g by mouth daily as needed. 510 g 5  
 hydrocortisone (ANUSOL-HC) 2.5 % rectal cream Insert  into rectum two (2) times a day. Use as needed as directed 90 g 1  
 butenafine (MENTAX) 1 % topical cream Mentax 1 % External Cream 
APPLY SPARINGLY TO AFFECTED AREA(S) ONCE DAILY Quantity: 60 Kyle Alfonso M.D.;  Started 12-Feb-2009 Active  fluticasone (FLONASE) 50 mcg/actuation nasal spray 2 Sprays by Both Nostrils route daily.  DULoxetine (CYMBALTA) 30 mg capsule Take 30 mg by mouth daily.  aspirin delayed-release 81 mg tablet Take  by mouth daily.  NIFEDIPINE (ADALAT CC PO) Take 60 mg by mouth daily.  SPIRONOLACTONE PO Take 25 mg by mouth two (2) times a day.  ALLOPURINOL PO Take 300 mg by mouth daily.  TOPIRAMATE (TOPAMAX PO) Take 100 mg by mouth two (2) times a day. Indications: TREMORS Allergies Allergen Reactions  Contrast Agent [Iodine] Hives  Iodinated Contrast- Oral And Iv Dye Itching REVIEW OF SYSTEMS Constitutional: Negative for fever, chills, or weight change. Respiratory: Negative for cough or shortness of breath. Cardiovascular: Negative for chest pain or palpitations. Gastrointestinal: Negative for acid reflux, change in bowel habits, or constipation. Genitourinary: Negative for dysuria and flank pain. Musculoskeletal: Positive for lumbar pain and right leg weakness. Skin: Negative for rash. Neurological: Negative for headaches, dizziness, or numbness. Endo/Heme/Allergies: Negative for increased bruising. Psychiatric/Behavioral: Negative for difficulty with sleep. PHYSICAL EXAMINATION Visit Vitals /74 Pulse 64 Temp 98 °F (36.7 °C) (Oral) Resp 14 Ht 5' 10\" (1.778 m) Wt 209 lb 6.4 oz (95 kg) SpO2 99% BMI 30.05 kg/m² Constitutional: Awake, alert, and in no acute distress. Neurological: 1+ symmetrical DTRs in the upper extremities. 1+ symmetrical DTRs in the lower extremities. Sensation to light touch is intact. Skin: warm, dry, and intact. Musculoskeletal: Good range of motion of both shoulders. Pain with palpation in the lower lumbar region. Moderate pain with extension and axial loading. No pain with internal or external rotation of his hips. Positive straight leg raise on the right; negative on the left. Patient ambulates with the assistance of a rollator. Biceps  Triceps Deltoids Wrist Ext Wrist Flex Hand Intrin Right +4/5 +4/5 +4/5 +4/5 +4/5 +4/5 Left +4/5 +4/5 +4/5 +4/5 +4/5 +4/5 Hip Flex  Quads Hamstrings Ankle DF EHL Ankle PF Right  4/5 +4/5 +4/5 +4/5 +4/5 +4/5 Left +4/5 +4/5 +4/5 +4/5 +4/5 +4/5 IMAGING: 
 
Lumbar spine MRI from 12/17/2018 was personally reviewed with the patient and demonstrated: 
Results from [place] encounter on 12/17/18 MRI LUMB SPINE W WO CONT Narrative Sagittal and axial multisequence MR images of lumbar spine were obtained without 
and with 19 cc Dotarem gadolinium IV contrast. 
 
HISTORY: Chronic low back pain with bilateral leg weakness. Also fell in tub tablet 2018. History of prostate cancer with lumbar surgery in 1982. COMPARISON: July 23, 2014 MRI. Straightening of the lumbar lordosis, stable. Trace retrolisthesis L5 and L4, 
stable. No compression fracture or pathologic marrow signal. Heterogeneous fatty 
elements in the vertebral body bone marrow, without pathologic marrow signal. 
 
Edema within the spinous processes of L4-L5 and L5-S1, less severe at L2-L3. 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 Conus medullaris ends at L1 with normal morphology and signal intensity. No 
abnormal enhancement. Presumed bilateral renal cysts. Stable. Partially imaged 
however. T12-L1: No abnormalities. L1-L2: Mild posterior lateral corner disc protrusion with left posterior lateral 
annular tear. No central stenosis. Facet hypertrophy with no significant 
foraminal stenosis. L2-L3: Posterior disc bulge and small central disc protrusion. Facet ligamentous 
hypertrophy. Posterior epidural fat. AP canal measures 10.4 mm. No central 
stenosis. Mild bilateral foraminal narrowing with no nerve root compression. L3-L4: Posterior disc bulge. Mild facet ligamentous hypertrophy. No significant 
central stenosis. Mild foraminal narrowing with no nerve root compression. L4-L5: Posterior disc bulge with posterior lateral disc protrusion and left 
annular tear. No significant central stenosis. Facet and ligamentous hypertrophy 
with mild foraminal narrowing with no nerve root compression. L5-S1: Posterior disc bulge. Heterogeneous left lateral anterior epidural 
scar/fat, with mild left lateral displacement of the left descending S1 nerve 
root. Facet and ligamentous hypertrophy. Severe bilateral foraminal stenosis 
with compression of bilateral exiting L5 nerve roots. Similar to prior study. More scarring tissues with sclerosis at the left posterior lamina, with mild 
mass effect to the left posterior lateral thecal sac.  
  
 Impression IMPRESSION: 
 1. Most severe disease at L5-S1, with L5 foraminal stenosis and exiting L5 nerve 
root compression bilaterally. Left anterior epidural scar with mild left lateral 
displacement of the left descending S1 nerve root. 2. Additional findings as described. Written by Juan Evans MD, [address], as dictated by Ron Falcon MD. 
I, Dr. Ron Falcon confirm that all documentation is accurate.

## 2019-02-21 ENCOUNTER — HOSPITAL ENCOUNTER (OUTPATIENT)
Dept: PHYSICAL THERAPY | Age: 74
Discharge: HOME | End: 2019-02-21
Payer: MEDICARE

## 2019-02-21 PROCEDURE — 97530 THERAPEUTIC ACTIVITIES: CPT

## 2019-02-21 PROCEDURE — 97110 THERAPEUTIC EXERCISES: CPT

## 2019-02-21 PROCEDURE — 97162 PT EVAL MOD COMPLEX 30 MIN: CPT

## 2019-02-21 NOTE — PROGRESS NOTES
PT DAILY TREATMENT NOTE - [facility]  Patient Name: Vikki Chiu Date:2019 : 1945 [x]  Patient  Verified Payor: [place] MEDICARE / Plan: Aleksey Siddiqui / Product Type: Medicare / In time:800  Out time:850 Total Treatment Time (min): 50 Total Timed Codes (min): 30 
1:1 Treatment Time ( only): 50 Visit #: 1 of 8 Treatment Area: Low back pain [M54.5] Muscle weakness (generalized) [M62.81] SUBJECTIVE Pain Level (0-10 scale): 8 Any medication changes, allergies to medications, adverse drug reactions, diagnosis change, or new procedure performed?: [x] No    [] Yes (see summary sheet for update) Subjective functional status:   [x] See Eval form in paper chart OBJECTIVE 20 min [x]Eval                  []Re-Eval  
 
15 min Therapeutic Activity:  [x]  See flow sheet : balance training with dynamic progression. Patient education on improving home activities/ADLs. Home modification Rationale: increase ROM, increase strength, improve coordination, improve balance and increase proprioception  to improve the patients ability to perform ADLs. 15 min Therapeutic Exercise:  [x] See flow sheet :HEP Rationale: increase ROM, increase strength, improve coordination, improve balance and increase proprioception to improve the patients ability to perform ADLs. With 
 [] TE 
 [] TA 
 [] neuro 
 [] other: Patient Education: [x] Review HEP [] Progressed/Changed HEP based on:  
[] positioning   [] body mechanics   [] transfers   [] heat/ice application   
[] other:   
      
 
  
 
Pain Level (0-10 scale) post treatment: 8 
 
ASSESSMENT:  
[x]  See Evaluation Goals: 
Short Term Goals: To be accomplished in 1 weeks: 1. Establish HEP for ROM & Strengthening. Long Term Goals: To be accomplished in 4 weeks: 1. Patient will be independent with HEP for ROM & Strengthening. Eval Status:na 2. Pt will increase score on Tinetti Balance & Gait Assessment to > 24/28 to demonstrate decreased fall risk. Eval Status:20/28 - moderate fall risk 3. Pt will increase FOTO score to 43 points to demonstrate increased ease with ADLs. Eval Status: FOTO: 28 
4. Pt will increase B knee ext/ hip flexion strength to grossly 5/5 to improve ease with gait. Eval Status:right knee ext: 3/5, left knee ext: 4/5. Right hip flexion:3/5, left hip flexion: 4/5 PLAN     [x]  Continue plan of care 
 
[]  Other:_   
 
Veronica Felix, PT 2/21/2019  9:04 AM

## 2019-02-21 NOTE — PROGRESS NOTES
[facility]  [facility]  [address] [place], [address] [phone] fax Plan of Care/ Statement of Necessity for Physical Therapy Services Patient name: James Belcher Start of Care: 2019 Referral source: Suzanne Diaz MD : 1945 Medical Diagnosis: Low back pain [M54.5] Muscle weakness (generalized) [M62.81] Payor: Michell Nunez / Plan: 222 Room 75y / Product Type: Medicare /  
 Onset Date:19 Treatment Diagnosis: LBP, balance and gait deficits Prior Hospitalization: see medical history Provider#: [id number] Medications: Verified on Patient summary List  
 Comorbidities: depression, enlarged prostate with hx of CA, HTN, OA, legally blind Prior Level of Function: lives alone in 1 level home. Has aide 3 days/week for 3 hours to assist with shopping/bathing/cleaning/cooking. Uses rollator for ambulation short distances The Plan of Care and following information is based on the information from the initial evaluation. Assessment/ key information: Patient is a 74 y. o.male presenting with Low back pain [M54.5] Muscle weakness (generalized) [M62.81]. Mr. Darnell Mcginnis presents to initial PT evaluation with c/o worsening low back pain, as well as balance and gait deficits leading to falls. He reports fatigue and severe back pain with prolonged walking, and has been falling frequently in his home. He uses rollator for most ambulation at home and in the community. There are noted LE strength deficits , right > left. He displays a cleared lumbar flexion bias for relief of symptoms and is unable to obtain erect standing posturing due to rigidity and pain. Symptoms appear consistent with spinal stenosis and generalized muscle weakness from sedentary lifestyle. We will work to address pain with emphasis on balance/gait training for safety with everyday life.  Patient will benefit from skilled PT services to address deficits and facilitate return to premorbid activity level and promote improved quality of life. Evaluation Complexity History HIGH Complexity :3+ comorbidities / personal factors will impact the outcome/ POC ; Examination MEDIUM Complexity : 3 Standardized tests and measures addressing body structure, function, activity limitation and / or participation in recreation  ;Presentation MEDIUM Complexity : Evolving with changing characteristics  ; Clinical Decision Making MEDIUM Complexity : FOTO score of 26-74 Overall Complexity Rating: MEDIUM Problem List: pain affecting function, decrease ROM, decrease strength, edema affecting function, impaired gait/ balance, decrease ADL/ functional abilitiies, decrease activity tolerance, decrease flexibility/ joint mobility and decrease transfer abilities Treatment Plan may include any combination of the following: Therapeutic exercise, Therapeutic activities, Neuromuscular re-education, Physical agent/modality, Gait/balance training, Manual therapy, Aquatic therapy, Patient education, Self Care training, Functional mobility training, Home safety training and Stair training Patient / Family readiness to learn indicated by: asking questions, trying to perform skills and interest 
Persons(s) to be included in education: patient (P) Barriers to Learning/Limitations: None Patient Goal (s): decrease pain Patient Self Reported Health Status: fair Rehabilitation Potential: fair Short Term Goals: To be accomplished in 1 weeks: 1. Establish HEP for ROM & Strengthening. Long Term Goals: To be accomplished in 4 weeks: 1. Patient will be independent with HEP for ROM & Strengthening. Eval Status:na 2. Pt will increase score on Tinetti Balance & Gait Assessment to > 24/28 to demonstrate decreased fall risk. Eval Status:20/28 - moderate fall risk 3. Pt will increase FOTO score to 43 points to demonstrate increased ease with ADLs.    
 Eval Status: FOTO: 28 
 4. Pt will increase B knee ext/ hip flexion strength to grossly 5/5 to improve ease with gait. Eval Status:right knee ext: 3/5, left knee ext: 4/5. Right hip flexion:3/5, left hip flexion: 4/5 Frequency / Duration: Patient to be seen 2 times per week for 4 weeks. Patient/ Caregiver education and instruction: Diagnosis, prognosis, self care, activity modification and exercises 
 [x]  Plan of care has been reviewed with PTA Certification Period: 2/21/19 - 3/23/19 Keira Moise, PT 2/21/2019 8:57 AM 
 
________________________________________________________________________ I certify that the above Therapy Services are being furnished while the patient is under my care. I agree with the treatment plan and certify that this therapy is necessary. [de-identified] Signature:____________Date:_________TIME:________ 
 
[employer], Date and Time must be completed for valid certification ** Please sign and return to [facility]  [facility] [address] [place], [address] 
[phone] fax

## 2019-02-26 ENCOUNTER — HOSPITAL ENCOUNTER (OUTPATIENT)
Dept: PHYSICAL THERAPY | Age: 74
Discharge: HOME | End: 2019-02-26
Payer: MEDICARE

## 2019-02-26 PROCEDURE — 97110 THERAPEUTIC EXERCISES: CPT

## 2019-02-26 PROCEDURE — 97112 NEUROMUSCULAR REEDUCATION: CPT

## 2019-02-26 NOTE — PROGRESS NOTES
PT DAILY TREATMENT NOTE 10-18 Patient Name: Kal Sebastian Date:2019 : 1945 [x]  Patient  Verified Payor: [place] MEDICARE / Plan: Aleksey Holguin y / Product Type: Medicare / In time:325  Out time:416 Total Treatment Time (min): 51 Visit #: 2 of 8 Medicare/BCBS Only Total Timed Codes (min):  51 1:1 Treatment Time:  45 Treatment Area: Low back pain [M54.5] Muscle weakness (generalized) [M62.81] SUBJECTIVE Pain Level (0-10 scale): 4 Any medication changes, allergies to medications, adverse drug reactions, diagnosis change, or new procedure performed?: [x] No    [] Yes (see summary sheet for update) Subjective functional status/changes:   [] No changes reported \"I'm doing ok right now because I took pain meds. \" OBJECTIVE 20 min Therapeutic Exercise:  [x] See flow sheet :  
Rationale: increase ROM, increase strength and improve coordination to improve the patients ability to perform ADLs. 31 min Neuromuscular Re-education:  [x]  See flow sheet :  
Rationale: increase ROM, increase strength, improve coordination, improve balance and increase proprioception  to improve the patients ability to decrease fall risk. With 
 [] TE 
 [] TA 
 [] neuro 
 [] other: Patient Education: [x] Review HEP [] Progressed/Changed HEP based on:  
[] positioning   [] body mechanics   [] transfers   [] heat/ice application   
[] other:   
 
Other Objective/Functional Measures:   
 
Pain Level (0-10 scale) post treatment: 0 
 
ASSESSMENT/Changes in Function: Mr. Drew Morrison fatigues quickly with standing activities and needs 2-3 seated rest breaks to accommodate all standing activities. Had some pain with seated cervical isometric activation for posture so will hold off next session.   
 
Patient will continue to benefit from skilled PT services to modify and progress therapeutic interventions, address functional mobility deficits, address ROM deficits, address strength deficits, analyze and address soft tissue restrictions, analyze and cue movement patterns, analyze and modify body mechanics/ergonomics, assess and modify postural abnormalities, address imbalance/dizziness and instruct in home and community integration to attain remaining goals. []  See Plan of Care 
[]  See progress note/recertification 
[]  See Discharge Summary Progress towards goals / Updated goals: 
Short Term Goals: To be accomplished in 1 weeks: 1. Establish HEP for ROM & Strengthening. 
  
Long Term Goals: To be accomplished in 4 weeks: 1. Patient will be independent with HEP for ROM & Strengthening. Eval Status:na 2. Pt will increase score on Tinetti Balance & Gait Assessment to > 24/28 to demonstrate decreased fall risk. Eval Status:20/28 - moderate fall risk 3. Pt will increase FOTO score to 43 points to demonstrate increased ease with ADLs. Eval Status: FOTO: 28 
4. Pt will increase B knee ext/ hip flexion strength to grossly 5/5 to improve ease with gait. Eval Status:right knee ext: 3/5, left knee ext: 4/5. Right hip flexion:3/5, left hip flexion: 4/5 
  
 
PLAN 
[]  Upgrade activities as tolerated     [x]  Continue plan of care 
[]  Update interventions per flow sheet      
[]  Discharge due to:_ 
[]  Other:_   
 
Noe Manzano PT 2/26/2019  4:42 PM 
 
Future Appointments Date Time Provider Karla Griffith 2/28/2019 10:30 AM PAULO Link [facility]  
3/5/2019 11:00 AM PAULO Link [facility]  
3/7/2019 10:30 AM PAULO Link [facility]  
3/12/2019 11:00 AM PAULO Link [facility]  
3/14/2019 10:30 AM PAULO Link [facility]  
3/19/2019 11:00 AM PAULO Link [facility]  
3/21/2019 10:30 AM [facility]UL[facility] Link [facility]  
3/26/2019 10:30 AM Thomas Woods  [address]  
 5/8/2019  8:00 AM [facility] RADIATION ONCOLOGY [facility], [facility]. [facility]  
12/11/2019 10:00 AM Peter Alexander MD [address] [facility]

## 2019-02-28 ENCOUNTER — HOSPITAL ENCOUNTER (OUTPATIENT)
Dept: PHYSICAL THERAPY | Age: 74
Discharge: HOME | End: 2019-02-28
Payer: MEDICARE

## 2019-02-28 PROCEDURE — 97112 NEUROMUSCULAR REEDUCATION: CPT

## 2019-02-28 PROCEDURE — 97110 THERAPEUTIC EXERCISES: CPT

## 2019-02-28 NOTE — PROGRESS NOTES
PT DAILY TREATMENT NOTE 10-18 Patient Name: Mariangel Pearson Date:2019 : 1945 [x]  Patient  Verified Payor: [place] MEDICARE / Plan: Aleksey Holguin y / Product Type: Medicare / In time:1028  Out time:1111 Total Treatment Time (min): 43 Visit #: 3 of 8 Medicare/BCBS Only Total Timed Codes (min):  43 1:1 Treatment Time: 33 Treatment Area: Low back pain [M54.5] Muscle weakness (generalized) [M62.81] SUBJECTIVE Pain Level (0-10 scale): 3-4 Any medication changes, allergies to medications, adverse drug reactions, diagnosis change, or new procedure performed?: [x] No    [] Yes (see summary sheet for update) Subjective functional status/changes:   [] No changes reported \"I'm a little stiff. \" OBJECTIVE 20 min Therapeutic Exercise:  [x] See flow sheet :  
Rationale: increase ROM, increase strength and improve coordination to improve the patients ability to perform ADLs.   
  
23 min Neuromuscular Re-education:  [x]  See flow sheet :  
Rationale: increase ROM, increase strength, improve coordination, improve balance and increase proprioception  to improve the patients ability to decrease fall risk. With 
 [] TE 
 [] TA 
 [] neuro 
 [] other: Patient Education: [x] Review HEP [] Progressed/Changed HEP based on:  
[] positioning   [] body mechanics   [] transfers   [] heat/ice application   
[] other:   
 
Other Objective/Functional Measures:    
 
Pain Level (0-10 scale) post treatment: 0 
 
ASSESSMENT/Changes in Function: Mr. Luis Antonio Coyne did well with progression of core stability activities. Remains limited with dynamic balance activities.   
 
Patient will continue to benefit from skilled PT services to modify and progress therapeutic interventions, address functional mobility deficits, address ROM deficits, address strength deficits, analyze and address soft tissue restrictions, analyze and cue movement patterns, analyze and modify body mechanics/ergonomics, assess and modify postural abnormalities, address imbalance/dizziness and instruct in home and community integration to attain remaining goals. []  See Plan of Care 
[]  See progress note/recertification 
[]  See Discharge Summary Progress towards goals / Updated goals: 
Short Term Goals: To be accomplished in 1 weeks: 1. Establish HEP for ROM & Strengthening.  MET Long Term Goals: To be accomplished in 4 weeks: 1. Patient will be independent with HEP for ROM & Strengthening. 
            Eval Status:na 2. Pt will increase score on Tinetti Balance & Gait Assessment to > 24/28 to demonstrate decreased fall risk. 
            Eval Status:20/28 - moderate fall risk 3. Pt will increase FOTO score to 43 points to demonstrate increased ease with ADLs.   
            Eval Status: FOTO: 28 
4. Pt will increase B knee ext/ hip flexion strength to grossly 5/5 to improve ease with gait.             Eval Status:right knee ext: 3/5, left knee ext: 4/5. Right hip flexion:3/5, left hip flexion: 4/5 PLAN 
[]  Upgrade activities as tolerated     [x]  Continue plan of care 
[]  Update interventions per flow sheet      
[]  Discharge due to:_ 
[]  Other:_   
 
Melyssa Shay, PT 2/28/2019  11:16 AM 
 
Future Appointments Date Time Provider Karla Griffith 3/5/2019 11:00 AM Laurent Llamas, PT [facility]  
3/7/2019 10:30 AM Laurent Llamas PT [facility]  
3/12/2019 11:00 AM Laurent Llamas, PT [facility]  
3/14/2019 10:30 AM Laurent Llamas, PT [facility]  
3/19/2019 11:00 AM Laurent Llamas, PT [facility]  
3/21/2019 10:30 AM Laurent Llamas, PT [facility]  
3/26/2019 10:30 AM Te Nunes  [address] 5/8/2019  8:00 AM [facility] RADIATION ONCOLOGY [facility], [facility]. [facility]  
12/11/2019 10:00 AM Sonido Alexander MD [address] [facility]

## 2019-03-05 ENCOUNTER — HOSPITAL ENCOUNTER (OUTPATIENT)
Dept: PHYSICAL THERAPY | Age: 74
Discharge: HOME | End: 2019-03-05
Payer: MEDICARE

## 2019-03-05 PROCEDURE — 97110 THERAPEUTIC EXERCISES: CPT

## 2019-03-05 PROCEDURE — 97112 NEUROMUSCULAR REEDUCATION: CPT

## 2019-03-05 NOTE — PROGRESS NOTES
PT DAILY TREATMENT NOTE 10-18    Patient Name: Judge Sahu  Date:3/5/2019  : 1945  [x]  Patient  Verified  Payor: VA MEDICARE / Plan: VA MEDICARE PART A & B / Product Type: Medicare /    In time:1100  Out time:1155  Total Treatment Time (min): 55  Visit #: 4 of 8    Medicare/BCBS Only   Total Timed Codes (min):  55 1:1 Treatment Time:  55       Treatment Area: Low back pain [M54.5]  Muscle weakness (generalized) [M62.81]    SUBJECTIVE  Pain Level (0-10 scale): 2-3  Any medication changes, allergies to medications, adverse drug reactions, diagnosis change, or new procedure performed?: [x] No    [] Yes (see summary sheet for update)  Subjective functional status/changes:   [] No changes reported  \"I'm feeling good today. \"    OBJECTIVE            25 min Therapeutic Exercise:  [x] See flow sheet :   Rationale: increase ROM, increase strength and improve coordination to improve the patients ability to perform ADLs.       30 min Neuromuscular Re-education:  [x]  See flow sheet :   Rationale: increase ROM, increase strength, improve coordination, improve balance and increase proprioception  to improve the patients ability to decrease fall risk.            With   [] TE   [] TA   [] neuro   [] other: Patient Education: [x] Review HEP    [] Progressed/Changed HEP based on:   [] positioning   [] body mechanics   [] transfers   [] heat/ice application    [] other:      Other Objective/Functional Measures:      Pain Level (0-10 scale) post treatment: 0    ASSESSMENT/Changes in Function: Mr. Kailee Dwakins did excellent with progression of squatting activities today/ needs less seated rest breaks to accommodate exercises and pain is improving in low back.      Patient will continue to benefit from skilled PT services to modify and progress therapeutic interventions, address functional mobility deficits, address ROM deficits, address strength deficits, analyze and address soft tissue restrictions, analyze and cue movement patterns, analyze and modify body mechanics/ergonomics, assess and modify postural abnormalities, address imbalance/dizziness and instruct in home and community integration to attain remaining goals. []  See Plan of Care  []  See progress note/recertification  []  See Discharge Summary         Progress towards goals / Updated goals:  Short Term Goals: To be accomplished in 1 weeks:  1. Establish HEP for ROM & Strengthening.  MET   Long Term Goals: To be accomplished in 4 weeks:  1. Patient will be independent with HEP for ROM & Strengthening.              Eval Status:na  2. Pt will increase score on Tinetti Balance & Gait Assessment to > 24/28 to demonstrate decreased fall risk.              Eval Status:20/28 - moderate fall risk  3. Pt will increase FOTO score to 43 points to demonstrate increased ease with ADLs.                Eval Status: FOTO: 28  4. Pt will increase B knee ext/ hip flexion strength to grossly 5/5 to improve ease with gait.             Eval Status:right knee ext: 3/5, left knee ext: 4/5.  Right hip flexion:3/5, left hip flexion: 4/5      PLAN  []  Upgrade activities as tolerated     [x]  Continue plan of care  []  Update interventions per flow sheet       []  Discharge due to:_  []  Other:_      Jovany Álvarez, PT 3/5/2019  11:53 AM    Future Appointments   Date Time Provider Karla Griffith   3/7/2019 10:30 AM Daniela Hoyt PT [facility] [address]   3/12/2019 11:00 AM [facility] [address]   3/14/2019 10:30 AM [facility] [address]   3/19/2019 11:00 AM [facility] [address]   3/21/2019 10:30 AM [facility] [address]   3/26/2019 10:30 AM Karishma Dykes  [address]    5/8/2019  8:00 AM [facility] RADIATION ONCOLOGY [facility], [facility]. [facility]   12/11/2019 10:00 AM Caro Alexander MD [address] [facility]

## 2019-03-07 ENCOUNTER — HOSPITAL ENCOUNTER (OUTPATIENT)
Dept: PHYSICAL THERAPY | Age: 74
Discharge: HOME | End: 2019-03-07
Payer: MEDICARE

## 2019-03-07 PROCEDURE — 97110 THERAPEUTIC EXERCISES: CPT

## 2019-03-07 PROCEDURE — 97112 NEUROMUSCULAR REEDUCATION: CPT

## 2019-03-07 NOTE — PROGRESS NOTES
PT DAILY TREATMENT NOTE 10-18    Patient Name: Yazmin Manzanares  Date:3/7/2019  : 1945  [x]  Patient  Verified  Payor: VA MEDICARE / Plan: VA MEDICARE PART A & B / Product Type: Medicare /    In time:1025  Out time:1121  Total Treatment Time (min): 56  Visit #: 5 of 8    Medicare/BCBS Only   Total Timed Codes (min):  56 1:1 Treatment Time:  56       Treatment Area: Low back pain [M54.5]  Muscle weakness (generalized) [M62.81]    SUBJECTIVE  Pain Level (0-10 scale): 0  Any medication changes, allergies to medications, adverse drug reactions, diagnosis change, or new procedure performed?: [x] No    [] Yes (see summary sheet for update)  Subjective functional status/changes:   [] No changes reported  \"I'm feeling good today. \"    OBJECTIVE     26 min Therapeutic Exercise:  [x] See flow sheet :   Rationale: increase ROM, increase strength and improve coordination to improve the patients ability to perform ADLs.       30 min Neuromuscular Re-education:  [x]  See flow sheet :   Rationale: increase ROM, increase strength, improve coordination, improve balance and increase proprioception  to improve the patients ability to decrease fall risk.                     With   [] TE   [] TA   [] neuro   [] other: Patient Education: [x] Review HEP    [] Progressed/Changed HEP based on:   [] positioning   [] body mechanics   [] transfers   [] heat/ice application    [] other:      Other Objective/Functional Measures:      Pain Level (0-10 scale) post treatment: 0    ASSESSMENT/Changes in Function: trunk fatigued a little more with standing activities today and he requires occasional tactile/verbal cuing to maintain upright posturing. Overall seeing decline in pain as well as large improvements with stability and endurance.      Patient will continue to benefit from skilled PT services to modify and progress therapeutic interventions, address functional mobility deficits, address ROM deficits, address strength deficits, analyze and address soft tissue restrictions, analyze and cue movement patterns, analyze and modify body mechanics/ergonomics, assess and modify postural abnormalities, address imbalance/dizziness and instruct in home and community integration to attain remaining goals. []  See Plan of Care  []  See progress note/recertification  []  See Discharge Summary         Progress towards goals / Updated goals:  Short Term Goals: To be accomplished in 1 weeks:  1. Establish HEP for ROM & Strengthening.  [address] be accomplished in 4 weeks:  1. Patient will be independent with HEP for ROM & Strengthening.              Eval Status:na  2. Pt will increase score on Tinetti Balance & Gait Assessment to > 24/28 to demonstrate decreased fall risk.              Eval Status:20/28 - moderate fall risk  3. Pt will increase FOTO score to 43 points to demonstrate increased ease with ADLs.                Eval Status: FOTO: 28  4. Pt will increase B knee ext/ hip flexion strength to grossly 5/5 to improve ease with gait.             Eval Status:right knee ext: 3/5, left knee ext: 4/5.  Right hip flexion:3/5, left hip flexion: 4/5    PLAN  []  Upgrade activities as tolerated     [x]  Continue plan of care  []  Update interventions per flow sheet       []  Discharge due to:_  []  Other:_      Karma Gonsales, PT 3/7/2019  11:21 AM    Future Appointments   Date Time Provider Karla Griffith   3/12/2019 11:00 AM [facility]   3/14/2019 10:30 AM PAULO Montiel [facility]   3/19/2019 11:00 AM PAULO MontielHS [facility]   3/21/2019 10:30 AM PAULO [facility]HS [facility]   3/26/2019 10:30 AM Florinda Christian  [address]    5/8/2019  8:00 AM [facility] RADIATION ONCOLOGY [facility], [facility]. [facility]   12/11/2019 10:00 AM Alexus Alexander MD [address] [facility]

## 2019-03-12 ENCOUNTER — HOSPITAL ENCOUNTER (OUTPATIENT)
Dept: PHYSICAL THERAPY | Age: 74
Discharge: HOME | End: 2019-03-12
Payer: MEDICARE

## 2019-03-12 PROCEDURE — 97112 NEUROMUSCULAR REEDUCATION: CPT

## 2019-03-12 PROCEDURE — 97110 THERAPEUTIC EXERCISES: CPT

## 2019-03-12 NOTE — PROGRESS NOTES
PT DAILY TREATMENT NOTE 10-18    Patient Name: Sandy Stevens  Date:3/12/2019  : 1945  [x]  Patient  Verified  Payor: [place] MEDICARE / Plan: [place] MEDICARE PART A & B / Product Type: Medicare /    In time:1100  Out time:1145  Total Treatment Time (min): 45  Visit #: 5 of 8    Medicare/BCBS Only   Total Timed Codes (min):  45 1:1 Treatment Time:  45       Treatment Area: Low back pain [M54.5]  Muscle weakness (generalized) [M62.81]    SUBJECTIVE  Pain Level (0-10 scale): 0  Any medication changes, allergies to medications, adverse drug reactions, diagnosis change, or new procedure performed?: [x] No    [] Yes (see summary sheet for update)  Subjective functional status/changes:   [] No changes reported  \" My back has been feeling great. \"    OBJECTIVE      25 min Therapeutic Exercise:  [x] See flow sheet :   Rationale: increase ROM, increase strength and improve coordination to improve the patients ability to perform ADLs.       20 min Neuromuscular Re-education:  [x]  See flow sheet :   Rationale: increase ROM, increase strength, improve coordination, improve balance and increase proprioception  to improve the patients ability to decrease fall risk.              With   [] TE   [] TA   [] neuro   [] other: Patient Education: [x] Review HEP    [] Progressed/Changed HEP based on:   [] positioning   [] body mechanics   [] transfers   [] heat/ice application    [] other:      Other Objective/Functional Measures:      Pain Level (0-10 scale) post treatment: 0    ASSESSMENT/Changes in Function: Mr. Fayrene Dakins did well with progression of upright posture and dynamic balance. He is improving standing/walking tolerance as well as squat form.     Patient will continue to benefit from skilled PT services to modify and progress therapeutic interventions, address functional mobility deficits, address ROM deficits, address strength deficits, analyze and address soft tissue restrictions, analyze and cue movement patterns, analyze and modify body mechanics/ergonomics, assess and modify postural abnormalities, address imbalance/dizziness and instruct in home and community integration to attain remaining goals. []  See Plan of Care  []  See progress note/recertification  []  See Discharge Summary         Progress towards goals / Updated goals:  Short Term Goals: To be accomplished in 1 weeks:  1. Establish HEP for ROM & Strengthening.  [address] be accomplished in 4 weeks:  1. Patient will be independent with HEP for ROM & Strengthening.              Eval Status:na  2. Pt will increase score on Tinetti Balance & Gait Assessment to > 24/28 to demonstrate decreased fall risk.              Eval Status:20/28 - moderate fall risk  3. Pt will increase FOTO score to 43 points to demonstrate increased ease with ADLs.                Eval Status: FOTO: 28  4. Pt will increase B knee ext/ hip flexion strength to grossly 5/5 to improve ease with gait.             Eval Status:right knee ext: 3/5, left knee ext: 4/5.  Right hip flexion:3/5, left hip flexion: 4/5        PLAN  []  Upgrade activities as tolerated     [x]  Continue plan of care  []  Update interventions per flow sheet       []  Discharge due to:_  []  Other:_      Samuel Rao, PT 3/12/2019  10:48 AM    Future Appointments   Date Time Provider Karla Griffith   3/12/2019 11:00 AM Cristiana Portillo PT [facility]   3/14/2019 10:30 AM Cristiana Portillo PT [facility]   3/19/2019 11:00 AM Cristiana Portillo PT [facility]   3/21/2019 10:30 AM Cristiana Portillo PT [facility]   3/26/2019 10:30 AM Luli Nicole  [address]    5/8/2019  8:00 AM [facility] RADIATION ONCOLOGY [facility]. [facility]   12/11/2019 10:00 AM Tariq Alexander MD [facility]

## 2019-03-14 ENCOUNTER — HOSPITAL ENCOUNTER (OUTPATIENT)
Dept: PHYSICAL THERAPY | Age: 74
Discharge: HOME | End: 2019-03-14
Payer: MEDICARE

## 2019-03-14 PROCEDURE — 97112 NEUROMUSCULAR REEDUCATION: CPT

## 2019-03-14 PROCEDURE — 97110 THERAPEUTIC EXERCISES: CPT

## 2019-03-14 NOTE — PROGRESS NOTES
PT DAILY TREATMENT NOTE 10-18    Patient Name: Josephine Alanis  Date:3/14/2019  : 1945  [x]  Patient  Verified  Payor: VA MEDICARE / Plan: VA MEDICARE PART A & B / Product Type: Medicare /    In time:1028Out time:112  Total Treatment Time (min): 47  Visit #: 6 of 8    Medicare/BCBS Only   Total Timed Codes (min):  54 1:1 Treatment Time:  54       Treatment Area: Low back pain [M54.5]  Muscle weakness (generalized) [M62.81]    SUBJECTIVE  Pain Level (0-10 scale): 0  Any medication changes, allergies to medications, adverse drug reactions, diagnosis change, or new procedure performed?: [x] No    [] Yes (see summary sheet for update)  Subjective functional status/changes:   [] No changes reported  \"I'm feeling better. \"    OBJECTIVE       25 min Therapeutic Exercise:  [x] See flow sheet :   Rationale: increase ROM, increase strength and improve coordination to improve the patients ability to perform ADLs.       29 min Neuromuscular Re-education:  [x]  See flow sheet :   Rationale: increase ROM, increase strength, improve coordination, improve balance and increase proprioception  to improve the patients ability to decrease fall risk.                  With   [] TE   [] TA   [] neuro   [] other: Patient Education: [x] Review HEP    [] Progressed/Changed HEP based on:   [] positioning   [] body mechanics   [] transfers   [] heat/ice application    [] other:      Other Objective/Functional Measures:      Pain Level (0-10 scale) post treatment: 0    ASSESSMENT/Changes in Function: Mr. Banks Neighbor did great with progression of dynamic balance activities today. Turning and stride length are much improved. Plan for reassessment next visit.      Patient will continue to benefit from skilled PT services to modify and progress therapeutic interventions, address functional mobility deficits, address ROM deficits, address strength deficits, analyze and address soft tissue restrictions, analyze and cue movement patterns, analyze and modify body mechanics/ergonomics, assess and modify postural abnormalities, address imbalance/dizziness and instruct in home and community integration to attain remaining goals. []  See Plan of Care  []  See progress note/recertification  []  See Discharge Summary         Progress towards goals / Updated goals:  Short Term Goals: To be accomplished in 1 weeks:  1. Establish HEP for ROM & Strengthening.  [address] be accomplished in 4 weeks:  1. Patient will be independent with HEP for ROM & Strengthening.              Eval Status:na  2. Pt will increase score on Tinetti Balance & Gait Assessment to > 24/28 to demonstrate decreased fall risk.              Eval Status:20/28 - moderate fall risk  3. Pt will increase FOTO score to 43 points to demonstrate increased ease with ADLs.                Eval Status: FOTO: 28  4. Pt will increase B knee ext/ hip flexion strength to grossly 5/5 to improve ease with gait.             Eval Status:right knee ext: 3/5, left knee ext: 4/5.  Right hip flexion:3/5, left hip flexion: 4/5              PLAN  []  Upgrade activities as tolerated     [x]  Continue plan of care  []  Update interventions per flow sheet       []  Discharge due to:_  []  Other:_      Michelle Avila, PT 3/14/2019  10:24 AM    Future Appointments   Date Time Provider Karla Griffith   3/14/2019 10:30 AM Vonnie Drivers, PT [facility]   3/19/2019 11:00 AM Vonnie Drivers, PT [facility]   3/21/2019 10:30 AM Vonnie Drivers, PT [facility]   3/26/2019 10:30 AM Veronica Jasso  [address]   5/8/2019  8:00 AM [facility] RADIATION ONCOLOGY [facility], [facility]. [facility]   12/11/2019 10:00 AM Rodriguez Alexander MD [facility]

## 2019-03-19 ENCOUNTER — HOSPITAL ENCOUNTER (OUTPATIENT)
Dept: PHYSICAL THERAPY | Age: 74
Discharge: HOME | End: 2019-03-19
Payer: MEDICARE

## 2019-03-19 PROCEDURE — 97110 THERAPEUTIC EXERCISES: CPT

## 2019-03-19 NOTE — PROGRESS NOTES
PT DISCHARGE DAILY NOTE AND [id number] Date:3/19/2019 Patient name: Tom Herron Start of Care: 2019 Referral source: Melanie Mcwilliams MD : 1945 Medical Diagnosis: Low back pain [M54.5] Muscle weakness (generalized) [M62.81] Payor: Luis Gibson / Plan: IncreaseCard / Product Type: Medicare /  
  Onset Date:19 Treatment Diagnosis: LBP, balance and gait deficits Prior Hospitalization: see medical history Provider#: [id number] Medications: Verified on Patient summary List  
 Comorbidities: depression, enlarged prostate with hx of CA, HTN, OA, legally blind Prior Level of Function: lives alone in 1 level home. Has aide 3 days/week for 3 hours to assist with shopping/bathing/cleaning/cooking. Uses rollator for ambulation short distances Visits from Start of Care: 8    Missed Visits: 0 Reporting Period : 19 to 3/19/19 [x]  Patient  Verified Payor: VA MEDICARE / Plan: IncreaseCard / Product Type: Medicare / In time:1100  Out time:1138 Total Treatment Time (min): 38 Total Timed Codes (min): 38 
1:1 Treatment Time ([facility] only): 38 Visit #: 8 of 8 SUBJECTIVE Pain Level (0-10 scale): 0 Any medication changes, allergies to medications, adverse drug reactions, diagnosis change, or new procedure performed?: [x] No    [] Yes (see summary sheet for update) Subjective functional status/changes:   [] No changes reported \"I'm doing so much better. \" OBJECTIVE 
  
38 min Therapeutic Exercise:  [x] See flow sheet :  
Rationale: increase ROM, increase strength and improve coordination to improve the patients ability to perform ADLs.   
       
With 
 [] TE 
 [] TA 
 [] neuro 
 [] other: Patient Education: [x] Review HEP [] Progressed/Changed HEP based on:  
[] positioning   [] body mechanics   [] transfers   [] heat/ice application   
[] other: Other Objective/Functional Measures: tinetti 25/28, FOTO: 65 Functional Gains: back pain better, balance better, longer strides, foot clearance, better standing posture and endurance, less pain medication needed Functional Deficits: nothing per patient 
% improvement: 90% Pain   Average: 0/10 Best: 0/10 Worst: 0/10 Patient Goal: \"keeping working on exercises and maintain progress. \" 
 
 
Pain Level (0-10 scale) post treatment: 0 Summary of Care: 
Short Term Goals: To be accomplished in 1 weeks: 1. Establish HEP for ROM & Strengthening. 
[place] be accomplished in 4 weeks: 1. Patient will be independent with HEP for ROM & Strengthening. 
            Eval Status:na MET 2. Pt will increase score on Tinetti Balance & Gait Assessment to > 24/28 to demonstrate decreased fall risk. 
            Eval Status:20/28 - moderate fall risk MET: 25/28 3. Pt will increase FOTO score to 43 points to demonstrate increased ease with ADLs.   
            Eval Status: FOTO: 28 
  MET: FOTO: 65 
4. Pt will increase B knee ext/ hip flexion strength to grossly 5/5 to improve ease with gait.             Eval Status:right knee ext: 3/5, left knee ext: 4/5. Right hip flexion:3/5, left hip flexion: 4/5 MET: 5/5 GROSSLY LE 
 
 
ASSESSMENT/Changes in Function Mr. Helen Toure has been a pleasure to work with and has seen excellent response to PT. He reports improved pain and is not taking pain medication for his back as of now. His balance is much improved and he is now at low risk for falls. Patient has advised that he is ready to transition to independent HEP for self progression of strength, walking tolerance, and postural endurance. We will discharge from outpatient PT services at this time. Thank you for this referral! 
  
PLAN [x]Discontinue therapy: [x]Patient has reached or is progressing toward set goals []Patient is non-compliant or has abdicated []Due to lack of appreciable progress towards set goals Tevin Joyce, PT 3/19/2019  10:18 AM

## 2019-03-21 ENCOUNTER — APPOINTMENT (OUTPATIENT)
Dept: PHYSICAL THERAPY | Age: 74
End: 2019-03-21
Payer: MEDICARE

## 2019-03-26 ENCOUNTER — OFFICE VISIT (OUTPATIENT)
Dept: ORTHOPEDIC SURGERY | Age: 74
End: 2019-03-26

## 2019-03-26 VITALS
OXYGEN SATURATION: 97 % | DIASTOLIC BLOOD PRESSURE: 72 MMHG | TEMPERATURE: 97.5 F | SYSTOLIC BLOOD PRESSURE: 124 MMHG | HEIGHT: 70 IN | HEART RATE: 75 BPM | WEIGHT: 207.6 LBS | BODY MASS INDEX: 29.72 KG/M2 | RESPIRATION RATE: 17 BRPM

## 2019-03-26 DIAGNOSIS — H54.8 LEGALLY BLIND: ICD-10-CM

## 2019-03-26 DIAGNOSIS — M47.816 LUMBAR SPONDYLOSIS: Primary | ICD-10-CM

## 2019-03-26 DIAGNOSIS — M99.79 FORAMINAL STENOSIS DUE TO INTERVERTEBRAL DISC DISEASE: ICD-10-CM

## 2019-03-26 DIAGNOSIS — M51.9 FORAMINAL STENOSIS DUE TO INTERVERTEBRAL DISC DISEASE: ICD-10-CM

## 2019-03-26 DIAGNOSIS — Z79.891 USE OF OPIATES FOR THERAPEUTIC PURPOSES: ICD-10-CM

## 2019-03-26 DIAGNOSIS — G89.29 OTHER CHRONIC PAIN: ICD-10-CM

## 2019-03-26 RX ORDER — ACETAMINOPHEN AND CODEINE PHOSPHATE 300; 30 MG/1; MG/1
1 TABLET ORAL
Qty: 60 TAB | Refills: 2 | Status: SHIPPED | OUTPATIENT
Start: 2019-04-04 | End: 2019-05-04

## 2019-03-26 RX ORDER — ACETAMINOPHEN AND CODEINE PHOSPHATE 300; 30 MG/1; MG/1
1 TABLET ORAL 2 TIMES DAILY
Qty: 60 TAB | Refills: 2 | Status: SHIPPED | OUTPATIENT
Start: 2019-04-04 | End: 2019-03-26 | Stop reason: SDUPTHER

## 2019-03-26 NOTE — PROGRESS NOTES
[address] 
[address] Phone: [phone] Fax: [phone] Pt's YOB: 1945 ASSESSMENT Diagnoses and all orders for this visit: 1. Lumbar spondylosis 
-     acetaminophen-codeine (TYLENOL #3) 300-30 mg per tablet; Take 1 Tab by mouth two (2) times daily as needed (Chronic Pain, DNF Before Start Date.) for up to 30 days. Max Daily Amount: 2 Tabs. 2. Other chronic pain 
-     acetaminophen-codeine (TYLENOL #3) 300-30 mg per tablet; Take 1 Tab by mouth two (2) times daily as needed (Chronic Pain, DNF Before Start Date.) for up to 30 days. Max Daily Amount: 2 Tabs. 3. Use of opiates for therapeutic purposes 
-     acetaminophen-codeine (TYLENOL #3) 300-30 mg per tablet; Take 1 Tab by mouth two (2) times daily as needed (Chronic Pain, DNF Before Start Date.) for up to 30 days. Max Daily Amount: 2 Tabs. 4. Foraminal stenosis due to intervertebral disc disease 5. Legally blind IMPRESSION AND PLAN: 
Soledad Mane is a 74 y.o. male with history of chronic lumbar pain. Pt reports improvement in his pain and balance since completing physical therapy. He takes Tylenol #3 1 tab BID as his pain warrants but has not recently taken the medication since his pain has improved. Pt continues to take Topamax 100 mg 1 tab QAM and 2 tabs QHS and Cymbalta 30 mg 1 tab daily. 1) Pt was given information on lumbar arthritis exercises. 2) He received a refill of Tylenol #3 1 tab BID prn severe pain. 3) Mr. Liam Hartmann has a reminder for a \"due or due soon\" health maintenance. I have asked that he contact his primary care provider, Tonia Berg MD, for follow-up on this health maintenance. 4)  demonstrated consistency with prescribing. 5) Last UDS from 12/12/2018 was consistent. 6) Pt will follow-up in 3 months or sooner if needed.  
 
 
HISTORY OF PRESENT ILLNESS: 
 Patric Mae is a 74 y.o. male with history of chronic lumbar pain and presents to the office today for follow up. Pt reports improvement in his pain and balance since completing physical therapy. He admits that his appetite has not changed but he admits to some weight loss with the physical therapy. Pt has been prescribed Tylenol #3 and takes 1 tab BID as his pain warrants. He notes that recently has not been taking the Tylenol #3 since his pain has improved. Pt continues to take Topamax 100 mg 1 tab QAM and 2 tabs QHS and Cymbalta 30 mg 1 tab daily. Pt at this time desires to continue with current care. Pain Scale: 0 - No pain/10 PCP: Emely Daniels MD 
  
Past Medical History:  
Diagnosis Date  Allergy  Anxiety  ED (erectile dysfunction)  Elevated prostate specific antigen (PSA)  Essential hypertension  Essential tremor  Gout  Hypogonadism in male  Hypotestosteronism  Ill-defined condition ESSENTIAL TREMORS  Legally blind  Nocturia  Nodular prostate without urinary obstruction  Panic attack  Prostate cancer ([facility] [address] 2/14/13 PNBx-T2a - Prostatic Volume: 26.7 grams - Jeffery 6 (3 + 3) -  Dr. Myriam Moya  Thyroid disease   
 small tumor Social History Socioeconomic History  Marital status:  Spouse name: Not on file  Number of children: Not on file  Years of education: Not on file  Highest education level: Not on file Occupational History  Not on file Social Needs  Financial resource strain: Not on file  Food insecurity:  
  Worry: Not on file Inability: Not on file  Transportation needs:  
  Medical: Not on file Non-medical: Not on file Tobacco Use  Smoking status: Never Smoker  Smokeless tobacco: Never Used Substance and Sexual Activity  Alcohol use: Yes Comment: seldom  Drug use: No  
 Sexual activity: Never Lifestyle  Physical activity: Days per week: Not on file Minutes per session: Not on file  Stress: Not on file Relationships  Social connections:  
  Talks on phone: Not on file Gets together: Not on file Attends Sikhism service: Not on file Active member of club or organization: Not on file Attends meetings of clubs or organizations: Not on file Relationship status: Not on file  Intimate partner violence:  
  Fear of current or ex partner: Not on file Emotionally abused: Not on file Physically abused: Not on file Forced sexual activity: Not on file Other Topics Concern  Not on file Social History Narrative  Not on file Current Outpatient Medications Medication Sig Dispense Refill  [START ON 4/4/2019] acetaminophen-codeine (TYLENOL #3) 300-30 mg per tablet Take 1 Tab by mouth two (2) times daily as needed (Chronic Pain, DNF Before Start Date.) for up to 30 days. Max Daily Amount: 2 Tabs. 60 Tab 2  
 alprostadil (MUSE) 500 mcg supp 500 mcg by Urethral route as needed. 6 Suppository 1  
 AFEDITAB CR 60 mg ER tablet  diclofenac (VOLTAREN) 1 % gel Apply  to affected area four (4) times daily. Apply to right hip 4 times per day as directed 5 Each 3  
 ergocalciferol (VITAMIN D2) 50,000 unit capsule Take 50,000 Units by mouth.  polyethylene glycol (MIRALAX) 17 gram/dose powder Take 17 g by mouth daily as needed. 510 g 5  
 hydrocortisone (ANUSOL-HC) 2.5 % rectal cream Insert  into rectum two (2) times a day. Use as needed as directed 90 g 1  
 butenafine (MENTAX) 1 % topical cream Mentax 1 % External Cream 
APPLY SPARINGLY TO AFFECTED AREA(S) ONCE DAILY Quantity: 60 Mario Omer M.D.;  Started 12-Feb-2009 Active  fluticasone (FLONASE) 50 mcg/actuation nasal spray 2 Sprays by Both Nostrils route daily.  DULoxetine (CYMBALTA) 30 mg capsule Take 30 mg by mouth daily.  aspirin delayed-release 81 mg tablet Take  by mouth daily.  NIFEDIPINE (ADALAT CC PO) Take 60 mg by mouth daily.  SPIRONOLACTONE PO Take 25 mg by mouth two (2) times a day.  ALLOPURINOL PO Take 300 mg by mouth daily.  TOPIRAMATE (TOPAMAX PO) Take 100 mg by mouth two (2) times a day. Indications: TREMORS Allergies Allergen Reactions  Contrast Agent [Iodine] Hives  Iodinated Contrast- Oral And Iv Dye Itching REVIEW OF SYSTEMS Constitutional: Negative for fever or chills. Positive for weight loss. Respiratory: Negative for cough or shortness of breath. Cardiovascular: Negative for chest pain or palpitations. Gastrointestinal: Negative for acid reflux, change in bowel habits, or constipation. Genitourinary: Negative for dysuria and flank pain. Musculoskeletal: Positive for lumbar pain. Skin: Negative for rash. Neurological: Negative for headaches, dizziness, or numbness. Endo/Heme/Allergies: Negative for increased bruising. Psychiatric/Behavioral: Negative for difficulty with sleep. PHYSICAL EXAMINATION Visit Vitals /72 Pulse 75 Temp 97.5 °F (36.4 °C) (Oral) Resp 17 Ht 5' 10\" (1.778 m) Wt 207 lb 9.6 oz (94.2 kg) SpO2 97% BMI 29.79 kg/m² Constitutional: Awake, alert, and in no acute distress. Neurological: 1+ symmetrical DTRs in the lower extremities. Sensation to light touch is intact. Skin: warm, dry, and intact. Musculoskeletal: Tenderness to palpation in the lower lumbar region. Moderate pain with extension and axial loading. No pain with internal or external rotation of his hips. Negative straight leg raise bilaterally. Patient ambulates with the assistance of a rolling walker. Hip Flex  Quads Hamstrings Ankle DF EHL Ankle PF Right +4/5 +4/5 +4/5 +4/5 +4/5 +4/5 Left +4/5 +4/5 +4/5 +4/5 +4/5 +4/5 IMAGING: 
 
Lumbar spine MRI from 12/17/2018 was personally reviewed with the patient and demonstrated: 
    
Results from Hospital Encounter on 12/17/18 MRI LUMB SPINE W WO CONT  
  Narrative Sagittal and axial multisequence MR images of lumbar spine were obtained without 
and with 19 cc Dotarem gadolinium IV contrast. 
  
HISTORY: Chronic low back pain with bilateral leg weakness. Also fell in tub 
tablet 2018. History of prostate cancer with lumbar surgery in 1982. 
  
COMPARISON: July 23, 2014 MRI. 
  
Straightening of the lumbar lordosis, stable. Trace retrolisthesis L5 and L4, 
stable. No compression fracture or pathologic marrow signal. Heterogeneous fatty 
elements in the vertebral body bone marrow, without pathologic marrow signal. 
  
Edema within the spinous processes of L4-L5 and L5-S1, less severe at L2-L3. 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 
  
Conus medullaris ends at L1 with normal morphology and signal intensity. No 
abnormal enhancement. Presumed bilateral renal cysts. Stable. Partially imaged 
however. 
  
T12-L1: No abnormalities. 
  
L1-L2: Mild posterior lateral corner disc protrusion with left posterior lateral 
annular tear. No central stenosis. Facet hypertrophy with no significant 
foraminal stenosis. 
  
L2-L3: Posterior disc bulge and small central disc protrusion. Facet ligamentous 
hypertrophy. Posterior epidural fat. AP canal measures 10.4 mm. No central 
stenosis. Mild bilateral foraminal narrowing with no nerve root compression. 
  
L3-L4: Posterior disc bulge. Mild facet ligamentous hypertrophy. No significant 
central stenosis. Mild foraminal narrowing with no nerve root compression. 
  
L4-L5: Posterior disc bulge with posterior lateral disc protrusion and left 
annular tear. No significant central stenosis. Facet and ligamentous hypertrophy 
with mild foraminal narrowing with no nerve root compression. 
  
L5-S1: Posterior disc bulge. Heterogeneous left lateral anterior epidural 
scar/fat, with mild left lateral displacement of the left descending S1 nerve 
root. Facet and ligamentous hypertrophy. Severe bilateral foraminal stenosis with compression of bilateral exiting L5 nerve roots. Similar to prior study. More scarring tissues with sclerosis at the left posterior lamina, with mild 
mass effect to the left posterior lateral thecal sac. 
   
  Impression IMPRESSION: 
1. Most severe disease at L5-S1, with L5 foraminal stenosis and exiting L5 nerve 
root compression bilaterally. Left anterior epidural scar with mild left lateral 
displacement of the left descending S1 nerve root. 2. Additional findings as described.  
  
Written by Charli Barrios MD, [place], as dictated by Chito Rockwell MD. 
I, Dr. Chito Rockwell confirm that all documentation is accurate.

## 2019-03-26 NOTE — LETTER
3/27/19 Patient: Vikki Chiu YOB: 1945 Date of Visit: 3/26/2019 Alvaro Childs MD 
[address] [address] VIA Facsimile: [phone] Dear Alvaro Childs MD, Thank you for referring Mr. Arely Flood to [address] [place] [address] for evaluation. My notes for this consultation are attached. If you have questions, please do not hesitate to call me. I look forward to following your patient along with you. Sincerely, Cecilia Harding MD

## 2019-03-26 NOTE — PATIENT INSTRUCTIONS
Low Back Arthritis: Exercises Your Care Instructions Here are some examples of typical rehabilitation exercises for your condition. Start each exercise slowly. Ease off the exercise if you start to have pain. Your doctor or physical therapist will tell you when you can start these exercises and which ones will work best for you. When you are not being active, find a comfortable position for rest. Some people are comfortable on the floor or a medium-firm bed with a small pillow under their head and another under their knees. Some people prefer to lie on their side with a pillow between their knees. Don't stay in one position for too long. Take short walks (10 to 20 minutes) every 2 to 3 hours. Avoid slopes, hills, and stairs until you feel better. Walk only distances you can manage without pain, especially leg pain. How to do the exercises Pelvic tilt 1. Lie on your back with your knees bent. 2. \"Brace\" your stomachtighten your muscles by pulling in and imagining your belly button moving toward your spine. 3. Press your lower back into the floor. You should feel your hips and pelvis rock back. 4. Hold for 6 seconds while breathing smoothly. 5. Relax and allow your pelvis and hips to rock forward. 6. Repeat 8 to 12 times. Back stretches 1. Get down on your hands and knees on the floor. 2. Relax your head and allow it to droop. Round your back up toward the ceiling until you feel a nice stretch in your upper, middle, and lower back. Hold this stretch for as long as it feels comfortable, or about 15 to 30 seconds. 3. Return to the starting position with a flat back while you are on your hands and knees. 4. Let your back sway by pressing your stomach toward the floor. Lift your buttocks toward the ceiling. 5. Hold this position for 15 to 30 seconds. 6. Repeat 2 to 4 times. Follow-up care is a key part of your treatment and safety.  Be sure to make and go to all appointments, and call your doctor if you are having problems. It's also a good idea to know your test results and keep a list of the medicines you take. Where can you learn more? Go to http://slava-todd.info/. Enter F260 in the search box to learn more about \"Low Back Arthritis: Exercises. \" Current as of: September 20, 2018 Content Version: 11.9 © 9109-1290 [employer]. Care instructions adapted under license by [employer] (which disclaims liability or warranty for this information). If you have questions about a medical condition or this instruction, always ask your healthcare professional. [address] any warranty or liability for your use of this information.

## 2019-05-07 ENCOUNTER — HOSPITAL ENCOUNTER (OUTPATIENT)
Dept: LAB | Age: 74
Discharge: HOME | End: 2019-05-07
Payer: MEDICARE

## 2019-05-07 DIAGNOSIS — C61 MALIGNANT NEOPLASM OF PROSTATE (HCC): ICD-10-CM

## 2019-05-07 LAB — PSA SERPL-MCNC: 0.2 NG/ML (ref 0–4)

## 2019-05-07 PROCEDURE — 84403 ASSAY OF TOTAL TESTOSTERONE: CPT

## 2019-05-07 PROCEDURE — 84153 ASSAY OF PSA TOTAL: CPT

## 2019-05-07 PROCEDURE — 36415 COLL VENOUS BLD VENIPUNCTURE: CPT

## 2019-05-08 LAB — TESTOST SERPL-MCNC: 233 NG/DL (ref 264–916)

## 2019-05-14 ENCOUNTER — HOSPITAL ENCOUNTER (OUTPATIENT)
Dept: RADIATION THERAPY | Age: 74
Discharge: HOME | End: 2019-05-14
Payer: MEDICARE

## 2019-05-14 PROCEDURE — 99211 OFF/OP EST MAY X REQ PHY/QHP: CPT

## 2019-07-02 ENCOUNTER — OFFICE VISIT (OUTPATIENT)
Dept: ORTHOPEDIC SURGERY | Age: 74
End: 2019-07-02

## 2019-07-02 VITALS
RESPIRATION RATE: 16 BRPM | TEMPERATURE: 97.8 F | WEIGHT: 208.4 LBS | HEIGHT: 70 IN | DIASTOLIC BLOOD PRESSURE: 75 MMHG | SYSTOLIC BLOOD PRESSURE: 122 MMHG | HEART RATE: 77 BPM | OXYGEN SATURATION: 96 % | BODY MASS INDEX: 29.84 KG/M2

## 2019-07-02 DIAGNOSIS — G89.4 CHRONIC PAIN SYNDROME: ICD-10-CM

## 2019-07-02 DIAGNOSIS — K59.00 CONSTIPATION, UNSPECIFIED CONSTIPATION TYPE: ICD-10-CM

## 2019-07-02 DIAGNOSIS — M48.062 SPINAL STENOSIS OF LUMBAR REGION WITH NEUROGENIC CLAUDICATION: Primary | ICD-10-CM

## 2019-07-02 DIAGNOSIS — M25.551 RIGHT HIP PAIN: ICD-10-CM

## 2019-07-02 DIAGNOSIS — M48.062 SPINAL STENOSIS OF LUMBAR REGION WITH NEUROGENIC CLAUDICATION: ICD-10-CM

## 2019-07-02 RX ORDER — ACETAMINOPHEN AND CODEINE PHOSPHATE 300; 30 MG/1; MG/1
1 TABLET ORAL
Qty: 60 TAB | Refills: 2 | Status: SHIPPED | OUTPATIENT
Start: 2019-07-02 | End: 2019-10-01 | Stop reason: SDUPTHER

## 2019-07-02 RX ORDER — DICLOFENAC SODIUM 10 MG/G
GEL TOPICAL 4 TIMES DAILY
Qty: 5 EACH | Refills: 3 | Status: SHIPPED | OUTPATIENT
Start: 2019-07-02 | End: 2020-07-23 | Stop reason: SDUPTHER

## 2019-07-02 RX ORDER — ACETAMINOPHEN AND CODEINE PHOSPHATE 300; 30 MG/1; MG/1
TABLET ORAL
COMMUNITY
Start: 2019-05-30 | End: 2019-07-02 | Stop reason: SDUPTHER

## 2019-07-02 RX ORDER — POLYETHYLENE GLYCOL 3350 17 G/17G
17 POWDER, FOR SOLUTION ORAL
Qty: 510 G | Refills: 5 | Status: SHIPPED | OUTPATIENT
Start: 2019-07-02 | End: 2019-10-01 | Stop reason: SDUPTHER

## 2019-07-02 NOTE — PROGRESS NOTES
[address]  [address], [address]  [place], [address] [place] [address] Street  Phone: [phone]  Fax: [phone]  PROGRESS NOTE  Patient: Kyle Hernandez                MRN: [id number]       SSN: [id number]  YOB: 1945        AGE: 74 y.o. SEX: male  Body mass index is 29.9 kg/m². PCP: Pato Reeves MD  07/02/19    Chief Complaint   Patient presents with    Back Pain     FU        HISTORY OF PRESENT ILLNESS:  Kyle Hernandez is a 74 y.o.  male with history of chronic back pain for 30+ years and radiation of pain intermittently to BLE in L5 distribution. Prior history of back problems: recurrent self limited episodes of low back pain in the past, previous osteoarthritis of lumbar spine and previous spinal surgery - in 1982. He has spinal stenosis and chronic compression of his L5 nerve. At last OV, he was doing well after some PT. Today, pain is aching, numbing, tingling and throbbing, pain is worse with walking and affects recreational activities. Pain is better with relaxation and pain medication. He reports that he increased his walking and his pain increased, so now he has scaled back his walking and his pain is back down to his baseline. Denies bladder/bowel dysfunction, saddle paresthesia, new weakness, new gait disturbance, or other neurological deficits. Medications: Tylenol #3 BID, Cymbalta 30mg daily, and Topamax 100mg 1Tab Qam and 2Tab QHS with moderate, relief. Reports that pain would be a 8/10 w/out medication and that it goes down to a 4/10 after medication. This enables the pt to be functional, achieve ADLs and engage in social activities. PMHx: Legally blind, prostate cancer, gout, HTN    LS MRI 12/18  IMPRESSION:  1. Most severe disease at L5-S1, with L5 foraminal stenosis and exiting L5 nerve  root compression bilaterally.  Left anterior epidural scar with mild left lateral  displacement of the left descending S1 nerve root. 2. Additional findings as described. Opioid Assessment    Least pain over the last week has been 4/10. Worst pain over the last week has been 7/10. Opioid Risk Tool Reviewed: YES  Aberrant behaviors: None. Urine Drug Screen: due - order placed. Controlled substance agreement on file: YES.  reviewed:yes  Pill count is consistent with his prescription: n/a  Concomitant use of a benzodiazepine: no  MME is 8  Narcan not warranted   Also,  abstinence syndrome was reviewed and discussed with her today N/A    Risks and benefits of ongoing opiate therapy have been reviewed with the patient. No pain behaviors. Denies thoughts of harming self or others. Pt has a good risk to benefit ratio which allows the pt to function in a home environment without side effects      ASSESSMENT   Diagnoses and all orders for this visit:    1. Spinal stenosis of lumbar region with neurogenic claudication  -     DRUG SCREEN UR - W/ CONFIRM; Future  -     acetaminophen-codeine (TYLENOL #3) 300-30 mg per tablet; Take 1 Tab by mouth two (2) times daily as needed for Pain for up to 30 days. Max Daily Amount: 2 Tabs. For chronic pain    2. Right hip pain  -     diclofenac (VOLTAREN) 1 % gel; Apply  to affected area four (4) times daily. Apply to right hip 4 times per day as directed    3. Constipation, unspecified constipation type  -     polyethylene glycol (MIRALAX) 17 gram/dose powder; Take 17 g by mouth daily as needed for Other (constipation). 4. Chronic pain syndrome  -     DRUG SCREEN UR - W/ CONFIRM; Future         IMPRESSION AND PLAN:  This is a chronic problem that is not changed. Per review of available records and patients , there are not sign of overuse, misuse, diversion, or concerning side effects.  Today we reviewed: the risk of overdose, addiction, and dependency proper storage and disposal of medications the goals of treatment (improve functionality, quality of life, and pain) alternative treatment options including non-narcotic modalities the risks and benefits of continuing with a narcotic based pain regimen  The following changes were made to the patients current treatment plan: nothing, medications refilled. > Pt was given information on chronic pain   > Continue Tylenol #3, refilled Miralax and Voltaren gel  > Continue HEP  > Continue Topamax and Cymbalta, pt gets thru PCP  > Mr. Kathy Navarro has a reminder for a \"due or due soon\" health maintenance. I have asked that he contact his primary care provider, Tommy Carter MD, for follow-up on this health maintenance.  > We have informed patient to notify us for immediate appointment if he has any worsening neurogical symptoms or if an emergency situation presents, then call 911  > Pt will follow-up in 3 MO w/me. Subjective    Work Disabled vet    Smoking Status non-smoker    Pain Scale: 5/10    Pain Assessment  7/2/2019   Location of Pain Back   Location Modifiers -   Severity of Pain 5   Quality of Pain Aching   Quality of Pain Comment tingling   Duration of Pain -   Frequency of Pain Constant   Aggravating Factors Walking   Limiting Behavior -   Relieving Factors Rest   Relieving Factors Comment sitting still    Result of Injury -   Type of Injury -         REVIEW OF SYSTEMS  Constitutional: Negative for fever, chills, or weight change. Respiratory: Negative for cough or shortness of breath. Cardiovascular: Negative for chest pain or palpitations. Gastrointestinal: Negative for incontinence, acid reflux, change in bowel habits, or constipation. Genitourinary: Negative for incontinence, dysuria and flank pain. Musculoskeletal: Positive for back and BLE pain. See HPI. Skin: Negative for rash. Neurological:intermittent BLE L5  radiculopathy. See HPI. Endo/Heme/Allergies: Negative. Psychiatric/Behavioral: Negative.       PHYSICAL EXAMINATION  Visit Vitals  /75   Pulse 77   Temp 97.8 °F (36.6 °C) (Oral)   Resp 16   Ht 5' 10\" (1.778 m)   Wt 208 lb 6.4 oz (94.5 kg)   SpO2 96%   BMI 29.90 kg/m²         Accompanied by Self. Constitutional:  Well developed, well nourished, in no acute distress. Psychiatric: Affect and mood are appropriate. Integumentary: No rashes or abrasions noted on exposed areas. Cardiovascular/Peripheral Vascular: +2 radial & pedal pulses. No peripheral edema is noted. Lymphatic:  No evidence of lymphedema. No cervical lymphadenopathy. SPINE/MUSCULOSKELETAL EXAM     Lumbar spine:  No rash, ecchymosis, or gross obliquity. No fasciculations. No focal atrophy is noted. Range of motion is decreased and pain with flexion, extension. Tenderness to palpation jonathan. No tenderness to palpation at the sciatic notch. SI joints non-tender. Trochanters non tender. Straight leg raise negative    Sensation grossly intact to light touch. MOTOR:     Hip Flex Quads Hamstrings Ankle DF EHL Ankle PF   Right +4/5 +4/5 +4/5 +4/5 +4/5 +4/5   Left +4/5 +4/5 +4/5 +4/5 +4/5 +4/5       Ambulation with walker-seated FWB.     Non-antalgic gait        PAST MEDICAL HISTORY   Past Medical History:   Diagnosis Date    Allergy     Anxiety     ED (erectile dysfunction)     Elevated prostate specific antigen (PSA)     Essential hypertension     Essential tremor     Gout     Hypogonadism in male     Hypotestosteronism     Ill-defined condition     ESSENTIAL TREMORS    Legally blind     Nocturia     Nodular prostate without urinary obstruction     Panic attack     Prostate cancer ([facility] [address] 2/14/13    PNBx-T2a - Prostatic Volume: 26.7 grams - [place] 6 (3 + 3) -  Dr. Kathy Shearer Thyroid disease     small tumor       Past Surgical History:   Procedure Laterality Date    HX APPENDECTOMY      HX BACK SURGERY  1982    HX CARPAL TUNNEL RELEASE      HX CATARACT REMOVAL      HX HERNIA REPAIR      umbilical   .      MEDICATIONS      Current Outpatient Medications   Medication Sig Dispense Refill    diclofenac (VOLTAREN) 1 % gel Apply  to affected area four (4) times daily. Apply to right hip 4 times per day as directed 5 Each 3    polyethylene glycol (MIRALAX) 17 gram/dose powder Take 17 g by mouth daily as needed for Other (constipation). 510 g 5    acetaminophen-codeine (TYLENOL #3) 300-30 mg per tablet Take 1 Tab by mouth two (2) times daily as needed for Pain for up to 30 days. Max Daily Amount: 2 Tabs. For chronic pain 60 Tab 2    alprostadil (MUSE) 500 mcg supp 500 mcg by Urethral route as needed. 6 Suppository 1    AFEDITAB CR 60 mg ER tablet       ergocalciferol (VITAMIN D2) 50,000 unit capsule Take 50,000 Units by mouth.  hydrocortisone (ANUSOL-HC) 2.5 % rectal cream Insert  into rectum two (2) times a day. Use as needed as directed 90 g 1    butenafine (MENTAX) 1 % topical cream Mentax 1 % External Cream  APPLY SPARINGLY TO AFFECTED AREA(S) ONCE DAILY   Quantity: 60       Billy [email or web address];  Started 12-Feb-2009  Active      fluticasone (FLONASE) 50 mcg/actuation nasal spray 2 Sprays by Both Nostrils route daily.  DULoxetine (CYMBALTA) 30 mg capsule Take 30 mg by mouth daily.  aspirin delayed-release 81 mg tablet Take  by mouth daily.  NIFEDIPINE (ADALAT CC PO) Take 60 mg by mouth daily.  SPIRONOLACTONE PO Take 25 mg by mouth two (2) times a day.  ALLOPURINOL PO Take 300 mg by mouth daily.  TOPIRAMATE (TOPAMAX PO) Take 100 mg by mouth two (2) times a day.  Indications: TREMORS          ALLERGIES    Allergies   Allergen Reactions    Contrast Agent [Iodine] Hives    Iodinated Contrast- Oral And Iv Dye Itching          SOCIAL HISTORY    Social History     Socioeconomic History    Marital status:      Spouse name: Not on file    Number of children: Not on file    Years of education: Not on file    Highest education level: Not on file   Occupational History    Not on file   Social Needs    Financial resource strain: Not on file    Food insecurity:     Worry: Not on file     Inability: Not on file    Transportation needs:     Medical: Not on file     Non-medical: Not on file   Tobacco Use    Smoking status: Never Smoker    Smokeless tobacco: Never Used   Substance and Sexual Activity    Alcohol use: Yes     Comment: seldom    Drug use: No    Sexual activity: Never   Lifestyle    Physical activity:     Days per week: Not on file     Minutes per session: Not on file    Stress: Not on file   Relationships    Social connections:     Talks on phone: Not on file     Gets together: Not on file     Attends Religion service: Not on file     Active member of club or organization: Not on file     Attends meetings of clubs or organizations: Not on file     Relationship status: Not on file    Intimate partner violence:     Fear of current or ex partner: Not on file     Emotionally abused: Not on file     Physically abused: Not on file     Forced sexual activity: Not on file   Other Topics Concern    Not on file   Social History Narrative    Not on file       FAMILY HISTORY    Family History   Problem Relation Age of Onset    Cancer Sister          Macy Medellin NP

## 2019-07-02 NOTE — PATIENT INSTRUCTIONS

## 2019-10-01 ENCOUNTER — OFFICE VISIT (OUTPATIENT)
Dept: ORTHOPEDIC SURGERY | Age: 74
End: 2019-10-01

## 2019-10-01 VITALS
OXYGEN SATURATION: 98 % | BODY MASS INDEX: 30.35 KG/M2 | HEIGHT: 70 IN | RESPIRATION RATE: 16 BRPM | DIASTOLIC BLOOD PRESSURE: 77 MMHG | WEIGHT: 212 LBS | SYSTOLIC BLOOD PRESSURE: 127 MMHG | TEMPERATURE: 98.9 F | HEART RATE: 66 BPM

## 2019-10-01 DIAGNOSIS — M48.062 SPINAL STENOSIS OF LUMBAR REGION WITH NEUROGENIC CLAUDICATION: ICD-10-CM

## 2019-10-01 DIAGNOSIS — K59.00 CONSTIPATION, UNSPECIFIED CONSTIPATION TYPE: ICD-10-CM

## 2019-10-01 DIAGNOSIS — M99.79 FORAMINAL STENOSIS DUE TO INTERVERTEBRAL DISC DISEASE: ICD-10-CM

## 2019-10-01 DIAGNOSIS — G89.29 OTHER CHRONIC PAIN: Primary | ICD-10-CM

## 2019-10-01 DIAGNOSIS — M51.9 FORAMINAL STENOSIS DUE TO INTERVERTEBRAL DISC DISEASE: ICD-10-CM

## 2019-10-01 DIAGNOSIS — H54.8 LEGALLY BLIND: ICD-10-CM

## 2019-10-01 DIAGNOSIS — Z79.891 USE OF OPIATES FOR THERAPEUTIC PURPOSES: ICD-10-CM

## 2019-10-01 DIAGNOSIS — M47.816 LUMBAR SPONDYLOSIS: ICD-10-CM

## 2019-10-01 RX ORDER — POLYETHYLENE GLYCOL 3350 17 G/17G
17 POWDER, FOR SOLUTION ORAL
Qty: 510 G | Refills: 5 | Status: SHIPPED | OUTPATIENT
Start: 2019-10-01 | End: 2021-04-05 | Stop reason: SDUPTHER

## 2019-10-01 RX ORDER — ACETAMINOPHEN AND CODEINE PHOSPHATE 300; 30 MG/1; MG/1
1 TABLET ORAL
Qty: 60 TAB | Refills: 2 | Status: SHIPPED | OUTPATIENT
Start: 2019-10-08 | End: 2020-01-15 | Stop reason: SDUPTHER

## 2019-10-01 NOTE — LETTER
10/2/19 Patient: Demetria Hernandez YOB: 1945 Date of Visit: 10/1/2019 Nitin Levi MD 
[address] [facility] [address] [facility] [address] VIA Facsimile: [phone] Dear Nitin Levi MD, Thank you for referring Mr. Shin May to [facility] for evaluation. My notes for this consultation are attached. If you have questions, please do not hesitate to call me. I look forward to following your patient along with you. Sincerely, Darryl Falk MD

## 2019-10-01 NOTE — PROGRESS NOTES
[facility]  Denise Hanna., [address] [place] [address], [facility]  Phone: [phone]  Fax: [phone]    Pt's YOB: 1945    ASSESSMENT   Diagnoses and all orders for this visit:    1. Other chronic pain  -     acetaminophen-codeine (TYLENOL #3) 300-30 mg per tablet; Take 1 Tab by mouth two (2) times daily as needed for Pain for up to 30 days. Max Daily Amount: 2 Tabs. For chronic pain    2. Spinal stenosis of lumbar region with neurogenic claudication  -     acetaminophen-codeine (TYLENOL #3) 300-30 mg per tablet; Take 1 Tab by mouth two (2) times daily as needed for Pain for up to 30 days. Max Daily Amount: 2 Tabs. For chronic pain    3. Lumbar spondylosis  -     acetaminophen-codeine (TYLENOL #3) 300-30 mg per tablet; Take 1 Tab by mouth two (2) times daily as needed for Pain for up to 30 days. Max Daily Amount: 2 Tabs. For chronic pain    4. Constipation, unspecified constipation type  -     polyethylene glycol (MIRALAX) 17 gram/dose powder; Take 17 g by mouth daily as needed for Other (constipation). 5. Use of opiates for therapeutic purposes    6. 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 Legally blind         IMPRESSION AND PLAN:  Lazarus Steele is a 74 y.o. male with history of chronic lumbar pain. Pt denies any change in symptoms since his last office visit. He complains of pain in the lower back that radiates down the legs. Pt takes Tylenol #3 1 tab BID as his pain warrants. 1) Pt was given information on lumbar arthritis exercises. 2) He received a refill of Tylenol #3 1 tab BID prn pain. 3) Pt will continue taking Cymbalta 60 mg and Topamax as prescribed by his PCP, Dejan Rush MD.  4) He received a refill of Miralax. 5) Mr. Diogenes Gómez has a reminder for a \"due or due soon\" health maintenance.  I have asked that he contact his primary care provider, Dejan Rush MD, for follow-up on this health maintenance. 6)  demonstrated consistency with prescribing. 7) Last UDS from 07/02/2019 was consistent. Follow-up and Dispositions    · Return in about 3 months (around 1/1/2020) for Medication follow up. HISTORY OF PRESENT ILLNESS:  Ally Araujo is a 74 y.o. male with history of chronic lumbar pain and presents to the office today for follow up. Pt denies any change in symptoms since his last office visit. He complains of pain in the lower back that radiates down the legs. Pt notes some weakness in the leg and tingling in the feet with prolonged ambulation. He reports that he is able to walk around his house well with the assistance of his cane. Pt has been prescribed Tylenol #3 and takes 1 tab BID as his pain warrants. Of note, patient's MME/day is 10. He keeps his medication secure. He takes Miralax as needed for constipation. Pt notes that he is followed by Dr. Jose Francisco Zhao for an essential tremor. Pt at this time desires to continue with current care.     Pain Scale: 4/10    PCP: Ruby Smith MD     Past Medical History:   Diagnosis Date    Allergy     Anxiety     ED (erectile dysfunction)     Elevated prostate specific antigen (PSA)     Essential hypertension     Essential tremor     Gout     Hypogonadism in male     Hypotestosteronism     Ill-defined condition     ESSENTIAL TREMORS    Legally blind     Nocturia     Nodular prostate without urinary obstruction     Panic attack     Prostate cancer ([facility] [address] 2/14/13    PNBx-T2a - Prostatic Volume: 26.7 grams - Jeffery 6 (3 + 3) -  Dr. Tory Garrett Thyroid disease     small tumor        Social History     Socioeconomic History    Marital status:      Spouse name: Not on file    Number of children: Not on file    Years of education: Not on file    Highest education level: Not on file   Occupational History    Not on file   Social Needs    Financial resource strain: Not on file    Food insecurity:     Worry: Not on file     Inability: Not on file    Transportation needs:     Medical: Not on file     Non-medical: Not on file   Tobacco Use    Smoking status: Never Smoker    Smokeless tobacco: Never Used   Substance and Sexual Activity    Alcohol use: Yes     Comment: seldom    Drug use: No    Sexual activity: Never   Lifestyle    Physical activity:     Days per week: Not on file     Minutes per session: Not on file    Stress: Not on file   Relationships    Social connections:     Talks on phone: Not on file     Gets together: Not on file     Attends [employer] service: Not on file     Active member of club or organization: Not on file     Attends meetings of clubs or organizations: Not on file     Relationship status: Not on file    Intimate partner violence:     Fear of current or ex partner: Not on file     Emotionally abused: Not on file     Physically abused: Not on file     Forced sexual activity: Not on file   Other Topics Concern    Not on file   Social History Narrative    Not on file       Current Outpatient Medications   Medication Sig Dispense Refill    [START ON 10/8/2019] acetaminophen-codeine (TYLENOL #3) 300-30 mg per tablet Take 1 Tab by mouth two (2) times daily as needed for Pain for up to 30 days. Max Daily Amount: 2 Tabs. For chronic pain 60 Tab 2    polyethylene glycol (MIRALAX) 17 gram/dose powder Take 17 g by mouth daily as needed for Other (constipation). 510 g 5    diclofenac (VOLTAREN) 1 % gel Apply  to affected area four (4) times daily. Apply to right hip 4 times per day as directed 5 Each 3    alprostadil (MUSE) 500 mcg supp 500 mcg by Urethral route as needed. 6 Suppository 1    AFEDITAB CR 60 mg ER tablet       ergocalciferol (VITAMIN D2) 50,000 unit capsule Take 50,000 Units by mouth.  hydrocortisone (ANUSOL-HC) 2.5 % rectal cream Insert  into rectum two (2) times a day.  Use as needed as directed 90 g 1    butenafine (MENTAX) 1 % topical cream Mentax 1 % External Cream  APPLY SPARINGLY TO AFFECTED AREA(S) ONCE DAILY   Quantity: 60       Davin Daly M.D.;  Started 12-Feb-2009  Active      fluticasone (FLONASE) 50 mcg/actuation nasal spray 2 Sprays by Both Nostrils route daily.  DULoxetine (CYMBALTA) 30 mg capsule Take 30 mg by mouth daily.  NIFEDIPINE (ADALAT CC PO) Take 60 mg by mouth daily.  SPIRONOLACTONE PO Take 25 mg by mouth two (2) times a day.  ALLOPURINOL PO Take 300 mg by mouth daily.  TOPIRAMATE (TOPAMAX PO) Take 100 mg by mouth two (2) times a day. Indications: TREMORS      aspirin delayed-release 81 mg tablet Take  by mouth daily. Allergies   Allergen Reactions    Contrast Agent [Iodine] Hives    Iodinated Contrast Media Itching         REVIEW OF SYSTEMS    Constitutional: Negative for fever, chills, or weight change. Respiratory: Negative for cough or shortness of breath. Cardiovascular: Negative for chest pain or palpitations. Gastrointestinal: Negative for acid reflux, change in bowel habits, or constipation. Genitourinary: Negative for dysuria and flank pain. Musculoskeletal: Positive for lumbar pain. Skin: Negative for rash. Neurological: Negative for headaches, dizziness, or numbness. Endo/Heme/Allergies: Negative for increased bruising. Psychiatric/Behavioral: Negative for difficulty with sleep. PHYSICAL EXAMINATION  Visit Vitals  /77   Pulse 66   Temp 98.9 °F (37.2 °C) (Oral)   Resp 16   Ht 5' 10\" (1.778 m)   Wt 212 lb (96.2 kg)   SpO2 98%   BMI 30.42 kg/m²       Constitutional: Awake, alert, and in no acute distress. Neurological: 1+ symmetrical DTRs in the lower extremities. Sensation to light touch is intact. Skin: warm, dry, and intact. Musculoskeletal: Tenderness to palpation in the lower lumbar region. Moderate pain with extension and axial loading. No pain with internal or external rotation of his hips. Negative straight leg raise bilaterally.     Patient ambulates with the assistance of a rolling walker. Hip Flex Quads Hamstrings Ankle DF EHL Ankle PF   Right 4/5 4/5 4/5 4/5 4/5 4/5   Left 4/5 4/5 4/5 4/5 4/5 4/5       IMAGING:    Lumbar spine MRI from 12/17/2018 was personally reviewed with the patient and demonstrated:          Results from [facility] on 12/17/18   MRI LUMB SPINE W WO CONT     Narrative Sagittal and axial multisequence MR images of lumbar spine were obtained without  and with 19 cc Dotarem gadolinium IV contrast.     HISTORY: Chronic low back pain with bilateral leg weakness. Also fell in tub  tablet 2018. History of prostate cancer with lumbar surgery in 1982.     COMPARISON: July 23, 2014 MRI.     Straightening of the lumbar lordosis, stable. Trace retrolisthesis L5 and L4,  stable. No compression fracture or pathologic marrow signal. Heterogeneous fatty  elements in the vertebral body bone marrow, without pathologic marrow signal.     Edema within the spinous processes of L4-L5 and L5-S1, less severe at L2-L3. 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     Conus medullaris ends at L1 with normal morphology and signal intensity. No  abnormal enhancement. Presumed bilateral renal cysts. Stable. Partially imaged  however.     T12-L1: No abnormalities.     L1-L2: Mild posterior lateral corner disc protrusion with left posterior lateral  annular tear. No central stenosis. Facet hypertrophy with no significant  foraminal stenosis.     L2-L3: Posterior disc bulge and small central disc protrusion. Facet ligamentous  hypertrophy. Posterior epidural fat. AP canal measures 10.4 mm. No central  stenosis. Mild bilateral foraminal narrowing with no nerve root compression.     L3-L4: Posterior disc bulge. Mild facet ligamentous hypertrophy. No significant  central stenosis. Mild foraminal narrowing with no nerve root compression.     L4-L5: Posterior disc bulge with posterior lateral disc protrusion and left  annular tear. No significant central stenosis.  Facet and ligamentous hypertrophy  with mild foraminal narrowing with no nerve root compression.     L5-S1: Posterior disc bulge. Heterogeneous left lateral anterior epidural  scar/fat, with mild left lateral displacement of the left descending S1 nerve  root. Facet and ligamentous hypertrophy. Severe bilateral foraminal stenosis  with compression of bilateral exiting L5 nerve roots. Similar to prior study. More scarring tissues with sclerosis at the left posterior lamina, with mild  mass effect to the left posterior lateral thecal sac.        Impression IMPRESSION:  1. Most severe disease at L5-S1, with L5 foraminal stenosis and exiting L5 nerve  root compression bilaterally. Left anterior epidural scar with mild left lateral  displacement of the left descending S1 nerve root. 2. Additional findings as described. Written by Shantel Duvall, as dictated by Manohar Schrader MD.  I, Dr. Manohar Schrader confirm that all documentation is accurate.

## 2019-10-01 NOTE — PATIENT INSTRUCTIONS
Low Back Arthritis: Exercises Introduction Here are some examples of typical rehabilitation exercises for your condition. Start each exercise slowly. Ease off the exercise if you start to have pain. Your doctor or physical therapist will tell you when you can start these exercises and which ones will work best for you. When you are not being active, find a comfortable position for rest. Some people are comfortable on the floor or a medium-firm bed with a small pillow under their head and another under their knees. Some people prefer to lie on their side with a pillow between their knees. Don't stay in one position for too long. Take short walks (10 to 20 minutes) every 2 to 3 hours. Avoid slopes, hills, and stairs until you feel better. Walk only distances you can manage without pain, especially leg pain. How to do the exercises Pelvic tilt 1. Lie on your back with your knees bent. 2. \"Brace\" your stomachtighten your muscles by pulling in and imagining your belly button moving toward your spine. 3. Press your lower back into the floor. You should feel your hips and pelvis rock back. 4. Hold for 6 seconds while breathing smoothly. 5. Relax and allow your pelvis and hips to rock forward. 6. Repeat 8 to 12 times. Back stretches 1. Get down on your hands and knees on the floor. 2. Relax your head and allow it to droop. Round your back up toward the ceiling until you feel a nice stretch in your upper, middle, and lower back. Hold this stretch for as long as it feels comfortable, or about 15 to 30 seconds. 3. Return to the starting position with a flat back while you are on your hands and knees. 4. Let your back sway by pressing your stomach toward the floor. Lift your buttocks toward the ceiling. 5. Hold this position for 15 to 30 seconds. 6. Repeat 2 to 4 times. Follow-up care is a key part of your treatment and safety.  Be sure to make and go to all appointments, and call your doctor if you are having problems. It's also a good idea to know your test results and keep a list of the medicines you take. Where can you learn more? Go to http://slava-todd.info/. Enter R760 in the search box to learn more about \"Low Back Arthritis: Exercises. \" Current as of: June 26, 2019 Content Version: 12.2 © 7122-4549 [employer]. Care instructions adapted under license by [employer] (which disclaims liability or warranty for this information). If you have questions about a medical condition or this instruction, always ask your healthcare professional. [address] any warranty or liability for your use of this information.

## 2019-11-29 ENCOUNTER — HOSPITAL ENCOUNTER (OUTPATIENT)
Dept: LAB | Age: 74
Discharge: HOME | End: 2019-11-29
Payer: MEDICARE

## 2019-11-29 DIAGNOSIS — C61 PROSTATE CANCER (HCC): ICD-10-CM

## 2019-11-29 LAB — PSA SERPL-MCNC: 0.2 NG/ML (ref 0–4)

## 2019-11-29 PROCEDURE — 84153 ASSAY OF PSA TOTAL: CPT

## 2019-11-29 PROCEDURE — 36415 COLL VENOUS BLD VENIPUNCTURE: CPT

## 2019-12-04 NOTE — PROGRESS NOTES
PSA 0.2 on 11/29/2019, / PSA  0.2 on 5/7/2019,/ PSA 0.4  On 11/30/2018    Let the patient know that PSA is stable.  Keep FU appointment    Feroz Rossi PA-C

## 2020-01-08 NOTE — TELEPHONE ENCOUNTER
Returned call to patient, verified Name/, informed patient of message below. Patient will contact his PCP, Dr. Adore Rachel for further refills. No further action required at this time.

## 2020-01-08 NOTE — TELEPHONE ENCOUNTER
Patient is requesting a refill on Cymbalta. I do not see where this was ever prescribed by our office. Please advise and pend new Rx if appropriate.     Notes from last OV:  3) Pt will continue taking Cymbalta 60 mg and Topamax as prescribed by his PCP, Gino Lerma MD.    Last visit:  10/1/19 with MD Booth Patient  Next appt:  2/4/20 with MD Booth Patient

## 2020-01-15 ENCOUNTER — OFFICE VISIT (OUTPATIENT)
Dept: ORTHOPEDIC SURGERY | Age: 75
End: 2020-01-15

## 2020-01-15 VITALS
WEIGHT: 207.2 LBS | HEIGHT: 70 IN | TEMPERATURE: 97.9 F | SYSTOLIC BLOOD PRESSURE: 124 MMHG | HEART RATE: 75 BPM | OXYGEN SATURATION: 97 % | RESPIRATION RATE: 18 BRPM | DIASTOLIC BLOOD PRESSURE: 66 MMHG | BODY MASS INDEX: 29.66 KG/M2

## 2020-01-15 DIAGNOSIS — H54.8 LEGALLY BLIND: ICD-10-CM

## 2020-01-15 DIAGNOSIS — M99.79 FORAMINAL STENOSIS DUE TO INTERVERTEBRAL DISC DISEASE: ICD-10-CM

## 2020-01-15 DIAGNOSIS — K59.00 CONSTIPATION, UNSPECIFIED CONSTIPATION TYPE: ICD-10-CM

## 2020-01-15 DIAGNOSIS — M47.816 LUMBAR SPONDYLOSIS: ICD-10-CM

## 2020-01-15 DIAGNOSIS — Z79.891 USE OF OPIATES FOR THERAPEUTIC PURPOSES: ICD-10-CM

## 2020-01-15 DIAGNOSIS — M48.062 SPINAL STENOSIS OF LUMBAR REGION WITH NEUROGENIC CLAUDICATION: ICD-10-CM

## 2020-01-15 DIAGNOSIS — M51.9 FORAMINAL STENOSIS DUE TO INTERVERTEBRAL DISC DISEASE: ICD-10-CM

## 2020-01-15 DIAGNOSIS — G89.29 OTHER CHRONIC PAIN: Primary | ICD-10-CM

## 2020-01-15 RX ORDER — ACETAMINOPHEN AND CODEINE PHOSPHATE 300; 30 MG/1; MG/1
1 TABLET ORAL
Qty: 60 TAB | Refills: 2 | Status: SHIPPED | OUTPATIENT
Start: 2020-01-15 | End: 2020-04-29 | Stop reason: SDUPTHER

## 2020-01-15 NOTE — PROGRESS NOTES
[facility]  [address] [place] [address], [facility]  Phone: [phone]  Fax: [phone]    Pt's YOB: 1945    ASSESSMENT   Diagnoses and all orders for this visit:    1. Other chronic pain  -     acetaminophen-codeine (TYLENOL #3) 300-30 mg per tablet; Take 1 Tab by mouth two (2) times daily as needed for Pain for up to 30 days. Max Daily Amount: 2 Tabs. For chronic pain    2. Use of opiates for therapeutic purposes  -     acetaminophen-codeine (TYLENOL #3) 300-30 mg per tablet; Take 1 Tab by mouth two (2) times daily as needed for Pain for up to 30 days. Max Daily Amount: 2 Tabs. For chronic pain    3. Spinal stenosis of lumbar region with neurogenic claudication  -     acetaminophen-codeine (TYLENOL #3) 300-30 mg per tablet; Take 1 Tab by mouth two (2) times daily as needed for Pain for up to 30 days. Max Daily Amount: 2 Tabs. For chronic pain    4. Lumbar spondylosis  -     acetaminophen-codeine (TYLENOL #3) 300-30 mg per tablet; Take 1 Tab by mouth two (2) times daily as needed for Pain for up to 30 days. Max Daily Amount: 2 Tabs. For chronic pain    5. Constipation, unspecified constipation type    6. 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 Legally blind         IMPRESSION AND PLAN:  Ben Gale is a 66 y.o. male with history of chronic lumbar pain. Pt admits to increased pain with colder weather and today rates his pain at a 6/10. He ran out of Tylenol #3 since his last office visit and generally takes Tylenol #3 1 tab BID with benefit. 1) Pt was given information on lumbar arthritis exercises. 2) He received a refill of Tylenol #3 1 tab BID prn chronic pain. 3) Mr. Nestor Monae has a reminder for a \"due or due soon\" health maintenance. I have asked that he contact his primary care provider, Shraddha Diallo MD, for follow-up on this health maintenance.   4)  demonstrated consistency with prescribing. Follow-up and Dispositions    · Return in about 10 weeks (around 3/25/2020) for Medication follow up. HISTORY OF PRESENT ILLNESS:  Enzo Singleton is a 66 y.o. male with history of chronic lumbar pain and presents to the office today for follow up. Pt admits to increased pain with colder weather and today rates his pain at a 6/10. He complains of pain in the lower back that radiates down the legs. Pt notes that he ran out of the Tylenol #3 since his last office visit and he never received a prescription for January. He generally takes the Tylenol #3 1 tab BID with benefit. Of note, patient's MME/day is 9. He keeps his medication secure. Pt notes that he uses Miralax as needed with benefit. He does not need a refill at this time. Pt at this time desires to continue with current care.     Pain Scale: 6/10    PCP: Tiarra Parehk MD     Past Medical History:   Diagnosis Date    Allergy     Anxiety     ED (erectile dysfunction)     Elevated prostate specific antigen (PSA)     Essential hypertension     Essential tremor     Gout     Hypogonadism in male     Hypotestosteronism     Ill-defined condition     ESSENTIAL TREMORS    Legally blind     Nocturia     Nodular prostate without urinary obstruction     Panic attack     Prostate cancer ([facility] [address] 2/14/13    PNBx-T2a - Prostatic Volume: 26.7 grams - Jeffery 6 (3 + 3) -  Dr. Herlinda Ingram Thyroid disease     small tumor        Social History     Socioeconomic History    Marital status:      Spouse name: Not on file    Number of children: Not on file    Years of education: Not on file    Highest education level: Not on file   Occupational History    Not on file   Social Needs    Financial resource strain: Not on file    Food insecurity:     Worry: Not on file     Inability: Not on file    Transportation needs:     Medical: Not on file     Non-medical: Not on file   Tobacco Use    Smoking status: Never Smoker  Smokeless tobacco: Never Used   Substance and Sexual Activity    Alcohol use: Yes     Comment: seldom    Drug use: No    Sexual activity: Yes   Lifestyle    Physical activity:     Days per week: Not on file     Minutes per session: Not on file    Stress: Not on file   Relationships    Social connections:     Talks on phone: Not on file     Gets together: Not on file     Attends [employer] service: Not on file     Active member of club or organization: Not on file     Attends meetings of clubs or organizations: Not on file     Relationship status: Not on file    Intimate partner violence:     Fear of current or ex partner: Not on file     Emotionally abused: Not on file     Physically abused: Not on file     Forced sexual activity: Not on file   Other Topics Concern    Not on file   Social History Narrative    Not on file       Current Outpatient Medications   Medication Sig Dispense Refill    acetaminophen-codeine (TYLENOL #3) 300-30 mg per tablet Take 1 Tab by mouth two (2) times daily as needed for Pain for up to 30 days. Max Daily Amount: 2 Tabs. For chronic pain 60 Tab 2    acetaminophen-codeine (TYLENOL #3) 300-30 mg per tablet Take 1 Tab by mouth two (2) times daily as needed.  allopurinol (ZYLOPRIM) 100 mg tablet       alprostadil (MUSE) 1,000 mcg supp 1,000 mcg by IntraURETHral route as needed (for ED.). 6 Suppository 1    polyethylene glycol (MIRALAX) 17 gram/dose powder Take 17 g by mouth daily as needed for Other (constipation). 510 g 5    diclofenac (VOLTAREN) 1 % gel Apply  to affected area four (4) times daily. Apply to right hip 4 times per day as directed 5 Each 3    hydrocortisone (ANUSOL-HC) 2.5 % rectal cream Insert  into rectum two (2) times a day.  Use as needed as directed 90 g 1    butenafine (MENTAX) 1 % topical cream Mentax 1 % External Cream  APPLY SPARINGLY TO AFFECTED AREA(S) ONCE DAILY   Quantity: 60       Cindy Yu M.D.;  Started 12-Feb-2009  Active      fluticasone (FLONASE) 50 mcg/actuation nasal spray 2 Sprays by Both Nostrils route daily.  DULoxetine (CYMBALTA) 30 mg capsule Take 30 mg by mouth daily.  NIFEDIPINE (ADALAT CC PO) Take 60 mg by mouth daily.  SPIRONOLACTONE PO Take 25 mg by mouth two (2) times a day.  TOPIRAMATE (TOPAMAX PO) Take 100 mg by mouth two (2) times a day. Indications: TREMORS      alprostadil (MUSE) 500 mcg supp 500 mcg by Urethral route as needed. 6 Suppository 1       Allergies   Allergen Reactions    Contrast Agent [Iodine] Hives    Iodinated Contrast Media Itching         REVIEW OF SYSTEMS    Constitutional: Negative for fever, chills, or weight change. Respiratory: Negative for cough or shortness of breath. Cardiovascular: Negative for chest pain or palpitations. Gastrointestinal: Negative for acid reflux, change in bowel habits, or constipation. Genitourinary: Negative for dysuria and flank pain. Musculoskeletal: Positive for lumbar pain. Skin: Negative for rash. Neurological: Negative for headaches, dizziness, or numbness. Endo/Heme/Allergies: Negative for increased bruising. Psychiatric/Behavioral: Negative for difficulty with sleep. PHYSICAL EXAMINATION  Visit Vitals  /66   Pulse 75   Temp 97.9 °F (36.6 °C) (Oral)   Resp 18   Ht 5' 10\" (1.778 m)   Wt 207 lb 3.2 oz (94 kg)   SpO2 97%   BMI 29.73 kg/m²       Constitutional: Awake, alert, and in no acute distress. Neurological: 1+ symmetrical DTRs in the lower extremities. Sensation to light touch is intact. Skin: warm, dry, and intact. Musculoskeletal: Tenderness to palpation in the lower lumbar region. Moderate pain with extension and axial loading. No pain with internal or external rotation of his hips. Negative straight leg raise bilaterally. Patient ambulates with the assistance of a rolling walker.       Hip Flex  Quads Hamstrings Ankle DF EHL Ankle PF   Right +4/5 +4/5 +4/5 +4/5 +4/5 +4/5   Left +4/5 +4/5 +4/5 +4/5 +4/5 +4/5     IMAGING:    Lumbar spine MRI from 12/17/2018 was personally reviewed with the patient and demonstrated:          Results from [facility] on 12/17/18   MRI LUMB SPINE W WO CONT     Narrative Sagittal and axial multisequence MR images of lumbar spine were obtained without  and with 19 cc Dotarem gadolinium IV contrast.     HISTORY: Chronic low back pain with bilateral leg weakness. Also fell in tub  tablet 2018. History of prostate cancer with lumbar surgery in 1982.     COMPARISON: July 23, 2014 MRI.     Straightening of the lumbar lordosis, stable. Trace retrolisthesis L5 and L4,  stable. No compression fracture or pathologic marrow signal. Heterogeneous fatty  elements in the vertebral body bone marrow, without pathologic marrow signal.     Edema within the spinous processes of L4-L5 and L5-S1, less severe at L2-L3. 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     Conus medullaris ends at L1 with normal morphology and signal intensity. No  abnormal enhancement. Presumed bilateral renal cysts. Stable. Partially imaged  however.     T12-L1: No abnormalities.     L1-L2: Mild posterior lateral corner disc protrusion with left posterior lateral  annular tear. No central stenosis. Facet hypertrophy with no significant  foraminal stenosis.     L2-L3: Posterior disc bulge and small central disc protrusion. Facet ligamentous  hypertrophy. Posterior epidural fat. AP canal measures 10.4 mm. No central  stenosis. Mild bilateral foraminal narrowing with no nerve root compression.     L3-L4: Posterior disc bulge. Mild facet ligamentous hypertrophy. No significant  central stenosis. Mild foraminal narrowing with no nerve root compression.     L4-L5: Posterior disc bulge with posterior lateral disc protrusion and left  annular tear. No significant central stenosis. Facet and ligamentous hypertrophy  with mild foraminal narrowing with no nerve root compression.     L5-S1: Posterior disc bulge.  Heterogeneous left lateral anterior epidural  scar/fat, with mild left lateral displacement of the left descending S1 nerve  root. Facet and ligamentous hypertrophy. Severe bilateral foraminal stenosis  with compression of bilateral exiting L5 nerve roots. Similar to prior study. More scarring tissues with sclerosis at the left posterior lamina, with mild  mass effect to the left posterior lateral thecal sac.        Impression IMPRESSION:  1. Most severe disease at L5-S1, with L5 foraminal stenosis and exiting L5 nerve  root compression bilaterally. Left anterior epidural scar with mild left lateral  displacement of the left descending S1 nerve root. 2. Additional findings as described.        Written by Bryant Leyden, as dictated by Juarez Garcia MD.  I, Dr. Juarez Garcia confirm that all documentation is accurate.

## 2020-01-15 NOTE — PATIENT INSTRUCTIONS
Low Back Arthritis: Exercises Introduction Here are some examples of typical rehabilitation exercises for your condition. Start each exercise slowly. Ease off the exercise if you start to have pain. Your doctor or physical therapist will tell you when you can start these exercises and which ones will work best for you. When you are not being active, find a comfortable position for rest. Some people are comfortable on the floor or a medium-firm bed with a small pillow under their head and another under their knees. Some people prefer to lie on their side with a pillow between their knees. Don't stay in one position for too long. Take short walks (10 to 20 minutes) every 2 to 3 hours. Avoid slopes, hills, and stairs until you feel better. Walk only distances you can manage without pain, especially leg pain. How to do the exercises Pelvic tilt 1. Lie on your back with your knees bent. 2. \"Brace\" your stomachtighten your muscles by pulling in and imagining your belly button moving toward your spine. 3. Press your lower back into the floor. You should feel your hips and pelvis rock back. 4. Hold for 6 seconds while breathing smoothly. 5. Relax and allow your pelvis and hips to rock forward. 6. Repeat 8 to 12 times. Back stretches 1. Get down on your hands and knees on the floor. 2. Relax your head and allow it to droop. Round your back up toward the ceiling until you feel a nice stretch in your upper, middle, and lower back. Hold this stretch for as long as it feels comfortable, or about 15 to 30 seconds. 3. Return to the starting position with a flat back while you are on your hands and knees. 4. Let your back sway by pressing your stomach toward the floor. Lift your buttocks toward the ceiling. 5. Hold this position for 15 to 30 seconds. 6. Repeat 2 to 4 times. Follow-up care is a key part of your treatment and safety.  Be sure to make and go to all appointments, and call your doctor if you are having problems. It's also a good idea to know your test results and keep a list of the medicines you take. Where can you learn more? Go to http://slava-todd.info/. Enter C000 in the search box to learn more about \"Low Back Arthritis: Exercises. \" Current as of: June 26, 2019 Content Version: 12.2 © 8289-5713 [employer]. Care instructions adapted under license by [employer] (which disclaims liability or warranty for this information). If you have questions about a medical condition or this instruction, always ask your healthcare professional. [address] any warranty or liability for your use of this information.

## 2020-03-20 NOTE — PROGRESS NOTES
Long term stable patient, UDS/  consistent. Will frefill Tylenol #3, can call for monthly refills.  Need to know pharmacy, as it will not esecribe to [facility] pharmacy    Please reschedule due to Sajan

## 2020-04-29 ENCOUNTER — VIRTUAL VISIT (OUTPATIENT)
Dept: ORTHOPEDIC SURGERY | Age: 75
End: 2020-04-29

## 2020-04-29 DIAGNOSIS — M47.816 LUMBAR SPONDYLOSIS: ICD-10-CM

## 2020-04-29 DIAGNOSIS — Z79.891 USE OF OPIATES FOR THERAPEUTIC PURPOSES: ICD-10-CM

## 2020-04-29 DIAGNOSIS — H54.8 LEGALLY BLIND: ICD-10-CM

## 2020-04-29 DIAGNOSIS — M48.062 SPINAL STENOSIS OF LUMBAR REGION WITH NEUROGENIC CLAUDICATION: Primary | ICD-10-CM

## 2020-04-29 DIAGNOSIS — K59.00 CONSTIPATION, UNSPECIFIED CONSTIPATION TYPE: ICD-10-CM

## 2020-04-29 DIAGNOSIS — G89.29 OTHER CHRONIC PAIN: ICD-10-CM

## 2020-04-29 RX ORDER — ACETAMINOPHEN AND CODEINE PHOSPHATE 300; 30 MG/1; MG/1
1 TABLET ORAL
Qty: 60 TAB | Refills: 2 | Status: SHIPPED | OUTPATIENT
Start: 2020-04-29 | End: 2020-05-29

## 2020-04-29 NOTE — PROGRESS NOTES
[facility]  [address] [place] [address], [facility] [address]  Phone: [phone]  Fax: [phone]    Pt's YOB: 1945    ASSESSMENT   Diagnoses and all orders for this visit:    1. Spinal stenosis of lumbar region with neurogenic claudication  -     acetaminophen-codeine (TYLENOL #3) 300-30 mg per tablet; Take 1 Tab by mouth two (2) times daily as needed for Pain for up to 30 days. Max Daily Amount: 2 Tabs. For chronic pain    2. Lumbar spondylosis  -     acetaminophen-codeine (TYLENOL #3) 300-30 mg per tablet; Take 1 Tab by mouth two (2) times daily as needed for Pain for up to 30 days. Max Daily Amount: 2 Tabs. For chronic pain    3. Other chronic pain  -     acetaminophen-codeine (TYLENOL #3) 300-30 mg per tablet; Take 1 Tab by mouth two (2) times daily as needed for Pain for up to 30 days. Max Daily Amount: 2 Tabs. For chronic pain    4. Use of opiates for therapeutic purposes  -     acetaminophen-codeine (TYLENOL #3) 300-30 mg per tablet; Take 1 Tab by mouth two (2) times daily as needed for Pain for up to 30 days. Max Daily Amount: 2 Tabs. For chronic pain    5. Constipation, unspecified constipation type    6. Legally blind         IMPRESSION AND PLAN:  Maxwell Ramires is a 66 y.o. male with history of chronic lumbar pain. Pt reports continued lower back pain that radiates down the left leg. He continues to take Tylenol #3 1 tab BID as his pain warrants with benefit. 1) Pt was given information on lumbar arthritis exercises. 2) He received a refill of Tylenol #3 1 tab BID prn pain. -- pt was driven to the office and he received the printed prescription so he may take it to [facility]  3) Mr. Sonia Bennett has a reminder for a \"due or due soon\" health maintenance. I have asked that he contact his primary care provider, Aster Schreiber MD, for follow-up on this health maintenance.   4)  demonstrated consistency with prescribing. 5) Last UDS from 07/02/2019 was consistent. Follow-up and Dispositions    · Return in about 3 months (around 7/29/2020) for Medication follow up. CPT Codes [id number] for Established Patients may apply to this TeleHealth Visit  Time-based coding, delete if not needed: I spent 12 minutes and 05 seconds from 10:11 AM to 10: 23 AM with this established patient, and >50% of the time was spent counseling and/or coordinating care regarding his symptoms and medications. Due to this being a TeleHealth evaluation, many elements of the physical examination are unable to be assessed. Pursuant to the emergency declaration under the [address] [place] [address], [facility] waiver authority and the [employer] Act, this Virtual Visit was conducted, with patient's consent, to reduce the patient's risk of exposure to COVID-19 and provide continuity of care for an established patient. Services were provided through a telephone discussion virtually to substitute for in-person clinic visit. HISTORY OF PRESENT ILLNESS:  Jc Carson is a 66 y.o. male with history of chronic lumbar pain and was evaluated from his home by telephone at the [facility] location on 4/29/2020 with his verbal consent for follow up. Pt reports continued lower back pain that radiates down the left leg. Today, he rates his pain at a 7/10. Pt notes two episodes of increased pain since his last office visit. He fell and reports an abrasion to the left knee. Pt notes that he also had difficulty transitioning from sit to stand due to weakness in the left leg and lower back pain. He reports that overall he has been doing well since his last office visit, despite the two episodes of pain. He followed up with his PCP, John Monzon MD, after the fall and yesterday with a telephone visit.  Pt continues to take Tylenol #3 1 tab BID as his pain warrants with benefit. Of note, patient's MME/day is 9. He takes Miralax as needed with benefit and denies any constipation at this time. He notes that he has been staying inside due to COVID-19. Pt at this time desires to continue with current care. He has a home care nurse that comes daily to assist with in home tasks. Of note, his daughter is a registered nurse and she works at the [employer] [place].      Pain Scale: 7/10    PCP: Hannah Mendez MD     Past Medical History:   Diagnosis Date    Allergy     Anxiety     ED (erectile dysfunction)     Elevated prostate specific antigen (PSA)     Essential hypertension     Essential tremor     Gout     Hypogonadism in male     Hypotestosteronism     Ill-defined condition     ESSENTIAL TREMORS    Legally blind     Nocturia     Nodular prostate without urinary obstruction     Panic attack     [facility]    PNBx-T2a - Prostatic Volume: 26.7 grams - [place] 6 (3 + 3) -  Dr. Jessica Alexander Thyroid disease     small tumor        Social History     Socioeconomic History    Marital status:      Spouse name: Not on file    Number of children: Not on file    Years of education: Not on file    Highest education level: Not on file   Occupational History    Not on file   Social Needs    Financial resource strain: Not on file    Food insecurity     Worry: Not on file     Inability: Not on file    Transportation needs     Medical: Not on file     Non-medical: Not on file   Tobacco Use    Smoking status: Never Smoker    Smokeless tobacco: Never Used   Substance and Sexual Activity    Alcohol use: Yes     Comment: seldom    Drug use: No    Sexual activity: Yes   Lifestyle    Physical activity     Days per week: Not on file     Minutes per session: Not on file    Stress: Not on file   Relationships    Social connections     Talks on phone: Not on file     Gets together: Not on file     Attends Confucianism service: Not on file     Active member of club or organization: Not on file     Attends meetings of clubs or organizations: Not on file     Relationship status: Not on file    Intimate partner violence     Fear of current or ex partner: Not on file     Emotionally abused: Not on file     Physically abused: Not on file     Forced sexual activity: Not on file   Other Topics Concern    Not on file   Social History Narrative    Not on file       Current Outpatient Medications   Medication Sig Dispense Refill    acetaminophen-codeine (TYLENOL #3) 300-30 mg per tablet Take 1 Tab by mouth two (2) times daily as needed for Pain for up to 30 days. Max Daily Amount: 2 Tabs. For chronic pain 60 Tab 2    acetaminophen-codeine (TYLENOL #3) 300-30 mg per tablet Take 1 Tab by mouth two (2) times daily as needed.  allopurinol (ZYLOPRIM) 100 mg tablet       alprostadil (MUSE) 1,000 mcg supp 1,000 mcg by IntraURETHral route as needed (for ED.). 6 Suppository 1    polyethylene glycol (MIRALAX) 17 gram/dose powder Take 17 g by mouth daily as needed for Other (constipation). 510 g 5    diclofenac (VOLTAREN) 1 % gel Apply  to affected area four (4) times daily. Apply to right hip 4 times per day as directed 5 Each 3    alprostadil (MUSE) 500 mcg supp 500 mcg by Urethral route as needed. 6 Suppository 1    hydrocortisone (ANUSOL-HC) 2.5 % rectal cream Insert  into rectum two (2) times a day. Use as needed as directed 90 g 1    butenafine (MENTAX) 1 % topical cream Mentax 1 % External Cream  APPLY SPARINGLY TO AFFECTED AREA(S) ONCE DAILY   Quantity: 60       Loren Gallagher M.D.;  Started 12-Feb-2009  Active      fluticasone (FLONASE) 50 mcg/actuation nasal spray 2 Sprays by Both Nostrils route daily.  DULoxetine (CYMBALTA) 30 mg capsule Take 30 mg by mouth daily.  NIFEDIPINE (ADALAT CC PO) Take 60 mg by mouth daily.  SPIRONOLACTONE PO Take 25 mg by mouth two (2) times a day.  TOPIRAMATE (TOPAMAX PO) Take 100 mg by mouth two (2) times a day. Indications: TREMORS         Allergies   Allergen Reactions    Contrast Agent [Iodine] Hives    Iodinated Contrast Media Itching         REVIEW OF SYSTEMS    Constitutional: Negative for fever, chills, or weight change. Respiratory: Negative for cough or shortness of breath. Cardiovascular: Negative for chest pain or palpitations. Gastrointestinal: Negative for acid reflux, change in bowel habits; positive for constipation. Genitourinary: Negative for dysuria and flank pain. Musculoskeletal: Positive for lumbar pain. Neurological: Negative for headaches, dizziness, or numbness. Psychiatric/Behavioral: Negative for difficulty with sleep. IMAGING:    Lumbar spine MRI from 12/17/2018 was personally reviewed with the patient and demonstrated:          Results from [facility] on 12/17/18   MRI LUMB SPINE W WO CONT     Narrative Sagittal and axial multisequence MR images of lumbar spine were obtained without  and with 19 cc Dotarem gadolinium IV contrast.     HISTORY: Chronic low back pain with bilateral leg weakness. Also fell in tub  tablet 2018. History of prostate cancer with lumbar surgery in 1982.     COMPARISON: July 23, 2014 MRI.     Straightening of the lumbar lordosis, stable. Trace retrolisthesis L5 and L4,  stable. No compression fracture or pathologic marrow signal. Heterogeneous fatty  elements in the vertebral body bone marrow, without pathologic marrow signal.     Edema within the spinous processes of L4-L5 and L5-S1, less severe at L2-L3. 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     Conus medullaris ends at L1 with normal morphology and signal intensity. No  abnormal enhancement. Presumed bilateral renal cysts. Stable. Partially imaged  however.     T12-L1: No abnormalities.     L1-L2: Mild posterior lateral corner disc protrusion with left posterior lateral  annular tear. No central stenosis.  Facet hypertrophy with no significant  foraminal stenosis.     L2-L3: Posterior disc bulge and small central disc protrusion. Facet ligamentous  hypertrophy. Posterior epidural fat. AP canal measures 10.4 mm. No central  stenosis. Mild bilateral foraminal narrowing with no nerve root compression.     L3-L4: Posterior disc bulge. Mild facet ligamentous hypertrophy. No significant  central stenosis. Mild foraminal narrowing with no nerve root compression.     L4-L5: Posterior disc bulge with posterior lateral disc protrusion and left  annular tear. No significant central stenosis. Facet and ligamentous hypertrophy  with mild foraminal narrowing with no nerve root compression.     L5-S1: Posterior disc bulge. Heterogeneous left lateral anterior epidural  scar/fat, with mild left lateral displacement of the left descending S1 nerve  root. Facet and ligamentous hypertrophy. Severe bilateral foraminal stenosis  with compression of bilateral exiting L5 nerve roots. Similar to prior study. More scarring tissues with sclerosis at the left posterior lamina, with mild  mass effect to the left posterior lateral thecal sac.        Impression IMPRESSION:  1. Most severe disease at L5-S1, with L5 foraminal stenosis and exiting L5 nerve  root compression bilaterally. Left anterior epidural scar with mild left lateral  displacement of the left descending S1 nerve root. 2. Additional findings as described. Written by Wilberto Turner, as dictated by Madiha Barahona MD.  I, Dr. Madiha Barahona confirm that all documentation is accurate.

## 2020-04-29 NOTE — PATIENT INSTRUCTIONS
Low Back Arthritis: Exercises Introduction Here are some examples of typical rehabilitation exercises for your condition. Start each exercise slowly. Ease off the exercise if you start to have pain. Your doctor or physical therapist will tell you when you can start these exercises and which ones will work best for you. When you are not being active, find a comfortable position for rest. Some people are comfortable on the floor or a medium-firm bed with a small pillow under their head and another under their knees. Some people prefer to lie on their side with a pillow between their knees. Don't stay in one position for too long. Take short walks (10 to 20 minutes) every 2 to 3 hours. Avoid slopes, hills, and stairs until you feel better. Walk only distances you can manage without pain, especially leg pain. How to do the exercises Pelvic tilt 1. Lie on your back with your knees bent. 2. \"Brace\" your stomachtighten your muscles by pulling in and imagining your belly button moving toward your spine. 3. Press your lower back into the floor. You should feel your hips and pelvis rock back. 4. Hold for 6 seconds while breathing smoothly. 5. Relax and allow your pelvis and hips to rock forward. 6. Repeat 8 to 12 times. Back stretches 1. Get down on your hands and knees on the floor. 2. Relax your head and allow it to droop. Round your back up toward the ceiling until you feel a nice stretch in your upper, middle, and lower back. Hold this stretch for as long as it feels comfortable, or about 15 to 30 seconds. 3. Return to the starting position with a flat back while you are on your hands and knees. 4. Let your back sway by pressing your stomach toward the floor. Lift your buttocks toward the ceiling. 5. Hold this position for 15 to 30 seconds. 6. Repeat 2 to 4 times. Follow-up care is a key part of your treatment and safety. Be sure to make and go to all appointments, and call your doctor if you are having problems. It's also a good idea to know your test results and keep a list of the medicines you take. Where can you learn more? Go to http://slava-todd.info/ Enter O185 in the search box to learn more about \"Low Back Arthritis: Exercises. \" Current as of: June 26, 2019Content Version: 12.4 © 3970-5684 [employer]. Care instructions adapted under license by [employer] (which disclaims liability or warranty for this information). If you have questions about a medical condition or this instruction, always ask your healthcare professional. [address] any warranty or liability for your use of this information.

## 2020-07-22 ENCOUNTER — TELEPHONE (OUTPATIENT)
Dept: ORTHOPEDIC SURGERY | Age: 75
End: 2020-07-22

## 2020-07-22 NOTE — TELEPHONE ENCOUNTER
Patient called stating that for the last week he is having a shocking pain in his left leg and left lower back that is causing him to not be able to sleep or walk correctly. He was wondering what he should do. He does have an appointment scheduled for 7/27/2020.  Please advise patient at [phone]

## 2020-07-22 NOTE — TELEPHONE ENCOUNTER
Returned call to patient, verified Name/, per patient he is going to call his son or his nurse to see if he can make appt for tomorrow morning. Per patient, he will need transportation. Patient will call back to office prior to 1630.

## 2020-07-23 ENCOUNTER — OFFICE VISIT (OUTPATIENT)
Dept: ORTHOPEDIC SURGERY | Age: 75
End: 2020-07-23

## 2020-07-23 VITALS
OXYGEN SATURATION: 97 % | HEART RATE: 66 BPM | HEIGHT: 70 IN | RESPIRATION RATE: 16 BRPM | SYSTOLIC BLOOD PRESSURE: 125 MMHG | DIASTOLIC BLOOD PRESSURE: 80 MMHG | TEMPERATURE: 97.9 F | BODY MASS INDEX: 29.63 KG/M2 | WEIGHT: 207 LBS

## 2020-07-23 DIAGNOSIS — Z79.891 USE OF OPIATES FOR THERAPEUTIC PURPOSES: ICD-10-CM

## 2020-07-23 DIAGNOSIS — G89.29 OTHER CHRONIC PAIN: ICD-10-CM

## 2020-07-23 DIAGNOSIS — H54.8 LEGALLY BLIND: ICD-10-CM

## 2020-07-23 DIAGNOSIS — M54.42 ACUTE MIDLINE LOW BACK PAIN WITH LEFT-SIDED SCIATICA: ICD-10-CM

## 2020-07-23 DIAGNOSIS — M25.551 RIGHT HIP PAIN: ICD-10-CM

## 2020-07-23 DIAGNOSIS — M54.50 LUMBAR PAIN: Primary | ICD-10-CM

## 2020-07-23 DIAGNOSIS — M47.816 LUMBAR SPONDYLOSIS: ICD-10-CM

## 2020-07-23 RX ORDER — DIPHENHYDRAMINE HCL 50 MG
CAPSULE ORAL
Qty: 2 CAP | Refills: 0 | OUTPATIENT
Start: 2020-07-23 | End: 2021-04-08

## 2020-07-23 RX ORDER — RANITIDINE 150 MG/1
TABLET, FILM COATED ORAL
Qty: 2 TAB | Refills: 0 | OUTPATIENT
Start: 2020-07-23 | End: 2020-07-23

## 2020-07-23 RX ORDER — KETOROLAC TROMETHAMINE 15 MG/ML
30 INJECTION, SOLUTION INTRAMUSCULAR; INTRAVENOUS ONCE
Qty: 1 VIAL | Refills: 0
Start: 2020-07-23 | End: 2020-07-23

## 2020-07-23 RX ORDER — ACETAMINOPHEN AND CODEINE PHOSPHATE 300; 30 MG/1; MG/1
1 TABLET ORAL
Qty: 90 TAB | Refills: 0 | Status: SHIPPED | OUTPATIENT
Start: 2020-07-23 | End: 2020-09-10 | Stop reason: SDUPTHER

## 2020-07-23 RX ORDER — DICLOFENAC SODIUM 10 MG/G
GEL TOPICAL 4 TIMES DAILY
Qty: 5 EACH | Refills: 3 | Status: SHIPPED | OUTPATIENT
Start: 2020-07-23 | End: 2021-10-11 | Stop reason: SDUPTHER

## 2020-07-23 RX ORDER — PREDNISONE 50 MG/1
TABLET ORAL
Qty: 3 TAB | Refills: 0 | OUTPATIENT
Start: 2020-07-23 | End: 2020-09-23 | Stop reason: ALTCHOICE

## 2020-07-23 RX ORDER — PREDNISONE 10 MG/1
TABLET ORAL
Qty: 32 TAB | Refills: 0 | Status: CANCELLED | OUTPATIENT
Start: 2020-07-23

## 2020-07-23 RX ORDER — PREDNISONE 10 MG/1
TABLET ORAL
Qty: 32 TAB | Refills: 0 | Status: SHIPPED | OUTPATIENT
Start: 2020-07-23 | End: 2020-09-23 | Stop reason: ALTCHOICE

## 2020-07-23 NOTE — PROGRESS NOTES
Per Dr. Hidalgo Place, please call patient, inform not to start prednisone taper until Monday. Prednisone 50mg for contrast dye prep is much higher than the dose she is providing for the taper. Called patient, verified Name/, informed patient of above. Provided patient with times to begin taking Contrast Dye Prep. Patient verbalized agreement/understanding. No further action required at this time.

## 2020-07-23 NOTE — LETTER
7/23/20 Patient: Ally Araujo YOB: 1945 Date of Visit: 7/23/2020 Carlon Olszewski, MD 
[address] [facility] [address] [place] [address] VIA Facsimile: [phone] Dear Carlon Olszewski, MD, Thank you for referring Mr. Raymundo Franco to [facility] for evaluation. My notes for this consultation are attached. If you have questions, please do not hesitate to call me. I look forward to following your patient along with you. Sincerely, Mikaela Jordan MD

## 2020-07-23 NOTE — TELEPHONE ENCOUNTER
Patient was seen in office today at [facility] by Dr. Gutierrez Calderon. No further action required at this time.

## 2020-07-23 NOTE — PROGRESS NOTES
[facility]  Denise Hanna., [address] [place] [address], [address]  Phone: [phone]  Fax: [phone]    Pt's YOB: 1945    ASSESSMENT   Diagnoses and all orders for this visit:    1. Lumbar pain  -     MRI LUMB SPINE W WO CONT; Future  -     AMB POC XRAY, SPINE, LUMBOSACRAL; 2 O    2. Acute midline low back pain with left-sided sciatica  -     acetaminophen-codeine (Tylenol-Codeine #3) 300-30 mg per tablet; Take 1 Tab by mouth every six to eight (6-8) hours as needed for Pain for up to 30 days. Max Daily Amount: 4 Tabs. -     KETOROLAC TROMETHAMINE INJ  -     ketorolac (TORADOL) 15 mg/mL soln injection; 2 mL by IntraMUSCular route once for 1 dose. -     NC THER/PROPH/DIAG INJECTION, SUBCUT/IM  -     predniSONE (DELTASONE) 10 mg tablet; 6 pills Day 1, 5 pills Day 2, 4 pills Day 3&4, 3 pills Day 5&6, 2 pills Day 7&8, 1 pill Day 9&10, 1/2 pill Day 11&12  -     MRI LUMB SPINE W WO CONT; Future  -     predniSONE (DELTASONE) 50 mg tablet; Take 1 tab PO 13 hrs prior to MRI (9pm on 07/23/20), 1 tab 7 hrs prior to MRI (3am on 07/24/20), and 1 tab 1 hr prior to MRI (9am on 07/24/20)  -     diphenhydrAMINE (BENADRYL) 50 mg capsule; Take 1 cap PO 7 hrs prior to MRI (3am on 07/24/20) and 1 cap PO 1 hr prior to MRI (9am on 07/24/20)    3. Lumbar spondylosis  -     MRI LUMB SPINE W WO CONT; Future    4. Other chronic pain  -     acetaminophen-codeine (Tylenol-Codeine #3) 300-30 mg per tablet; Take 1 Tab by mouth every six to eight (6-8) hours as needed for Pain for up to 30 days. Max Daily Amount: 4 Tabs. 5. Use of opiates for therapeutic purposes  -     acetaminophen-codeine (Tylenol-Codeine #3) 300-30 mg per tablet; Take 1 Tab by mouth every six to eight (6-8) hours as needed for Pain for up to 30 days. Max Daily Amount: 4 Tabs. 6. Legally blind    7. Right hip pain  -     diclofenac (VOLTAREN) 1 % gel; Apply  to affected area four (4) times daily.  Apply to right hip 4 times per day as directed         IMPRESSION AND PLAN:  Luisa Sullivan is a 66 y.o. male with history of lumbar pain. Pt complains of increased, sharp, shocking, electrical like pain radiating down the left leg to the calf. He takes Tylenol #3 1 tab BID as his pain warrants but reports that recently it has only provided slight relief. 1) Pt was given information on herniated disc exercises. 2) A lumbar MRI was ordered. Pt has severe lumbar pain with left radicular symptoms, left leg weakness, and difficulty standing/walking-- he will use the initial prednisone dosage for the MRI and then follow this up with the taper to address his pain as symptoms warrant. 3) He was prescribed a large prednisone taper. 4) Pt will increase his Tylenol #3 to 1 tab Q6-8 hours prn pain to better manage his pain. 5) Pt received a 30 mg Toradol injection in the office today. 6) He also received a refill of Voltaren 1% gel. 7) Mr. Dominic De León has a reminder for a \"due or due soon\" health maintenance. I have asked that he contact his primary care provider, Yogesh Thompson MD, for follow-up on this health maintenance. 8)  demonstrated consistency with prescribing. 9) Last UDS from 07/02/2019 was consistent. Follow-up and Dispositions    · Return in about 2 weeks (around 8/6/2020) for Diagnostic Test follow up, Medication follow up. HISTORY OF PRESENT ILLNESS:  Luisa Sullivan is a 66 y.o. male with history of lumbar pain and presents to the office today for follow up. Pt complains of increased, sharp, shocking, electrical like pain radiating down the left leg to the calf. He also notes a dull aching pain across the buttocks. Pt notes a similar episode of severe pain in the past and notes that his son and grandson had to help he out of the chair. He states that his pain gradually improved with time. Pt denies any recent falls, trauma, or recent heavy lifting.  He notes that he was unable to sleep last night secondary to severe pain. Pt takes Tylenol #3 1 tab BID as his pain warrants but reports that recently it has only provided slight relief. He notes some relief when taking prednisone in the past. Pt is not currently on anticoagulants. Of note, he had previous lumbar surgery in 1982. He denies any history of renal disease and notes that he has never been told to avoid NSAID's. Pt has been using Voltaren 1% gel as needed and request a refill. Pt at this time desires to proceed with a lumbar MRI and medication evaluation.      Pain Scale: 10 - Worst pain ever/10    PCP: Dawson Henson MD     Past Medical History:   Diagnosis Date    Allergy     Anxiety     ED (erectile dysfunction)     Elevated prostate specific antigen (PSA)     Essential hypertension     Essential tremor     Gout     Hypogonadism in male     Hypotestosteronism     Ill-defined condition     ESSENTIAL TREMORS    Legally blind     Nocturia     Nodular prostate without urinary obstruction     Panic attack     Prostate cancer ([facility] [address] 2/14/13    PNBx-T2a - Prostatic Volume: 26.7 grams - Jeffery 6 (3 + 3) -  Dr. [facility] Thyroid disease     small tumor        Social History     Socioeconomic History    Marital status:      Spouse name: Not on file    Number of children: Not on file    Years of education: Not on file    Highest education level: Not on file   Occupational History    Not on file   Social Needs    Financial resource strain: Not on file    Food insecurity     Worry: Not on file     Inability: Not on file    Transportation needs     Medical: Not on file     Non-medical: Not on file   Tobacco Use    Smoking status: Never Smoker    Smokeless tobacco: Never Used   Substance and Sexual Activity    Alcohol use: Yes     Comment: seldom    Drug use: No    Sexual activity: Yes   Lifestyle    Physical activity     Days per week: Not on file     Minutes per session: Not on file    Stress: Not on file   Relationships    Social connections     Talks on phone: Not on file     Gets together: Not on file     Attends Anabaptism service: Not on file     Active member of club or organization: Not on file     Attends meetings of clubs or organizations: Not on file     Relationship status: Not on file    Intimate partner violence     Fear of current or ex partner: Not on file     Emotionally abused: Not on file     Physically abused: Not on file     Forced sexual activity: Not on file   Other Topics Concern    Not on file   Social History Narrative    Not on file       Current Outpatient Medications   Medication Sig Dispense Refill    acetaminophen-codeine (Tylenol-Codeine #3) 300-30 mg per tablet Take 1 Tab by mouth every six to eight (6-8) hours as needed for Pain for up to 30 days. Max Daily Amount: 4 Tabs. 90 Tab 0    diclofenac (VOLTAREN) 1 % gel Apply  to affected area four (4) times daily. Apply to right hip 4 times per day as directed 5 Each 3    predniSONE (DELTASONE) 10 mg tablet 6 pills Day 1, 5 pills Day 2, 4 pills Day 3&4, 3 pills Day 5&6, 2 pills Day 7&8, 1 pill Day 9&10, 1/2 pill Day 11&12 32 Tab 0    predniSONE (DELTASONE) 50 mg tablet Take 1 tab PO 13 hrs prior to MRI (9pm on 07/23/20), 1 tab 7 hrs prior to MRI (3am on 07/24/20), and 1 tab 1 hr prior to MRI (9am on 07/24/20) 3 Tab 0    diphenhydrAMINE (BENADRYL) 50 mg capsule Take 1 cap PO 7 hrs prior to MRI (3am on 07/24/20) and 1 cap PO 1 hr prior to MRI (9am on 07/24/20) 2 Cap 0    acetaminophen-codeine (TYLENOL #3) 300-30 mg per tablet Take 1 Tab by mouth two (2) times daily as needed.  allopurinol (ZYLOPRIM) 100 mg tablet       alprostadil (MUSE) 1,000 mcg supp 1,000 mcg by IntraURETHral route as needed (for ED.). 6 Suppository 1    polyethylene glycol (MIRALAX) 17 gram/dose powder Take 17 g by mouth daily as needed for Other (constipation).  510 g 5    hydrocortisone (ANUSOL-HC) 2.5 % rectal cream Insert  into rectum two (2) times a day. Use as needed as directed 90 g 1    butenafine (MENTAX) 1 % topical cream Mentax 1 % External Cream  APPLY SPARINGLY TO AFFECTED AREA(S) ONCE DAILY   Quantity: 60       Herminia Juan Manuel LEON;  Started 12-Feb-2009  Active      fluticasone (FLONASE) 50 mcg/actuation nasal spray 2 Sprays by Both Nostrils route daily.  DULoxetine (CYMBALTA) 30 mg capsule Take 30 mg by mouth daily.  NIFEDIPINE (ADALAT CC PO) Take 60 mg by mouth daily.  SPIRONOLACTONE PO Take 25 mg by mouth two (2) times a day.  TOPIRAMATE (TOPAMAX PO) Take 100 mg by mouth two (2) times a day. Indications: TREMORS      alprostadil (MUSE) 500 mcg supp 500 mcg by Urethral route as needed. 6 Suppository 1       Allergies   Allergen Reactions    Contrast Agent [Iodine] Hives    Iodinated Contrast Media Itching         REVIEW OF SYSTEMS    Constitutional: Negative for fever, chills, or weight change. Respiratory: Negative for cough or shortness of breath. Cardiovascular: Negative for chest pain or palpitations. Gastrointestinal: Negative for acid reflux, change in bowel habits, or constipation. Genitourinary: Negative for dysuria and flank pain. Musculoskeletal: Positive for lumbar pain, difficulty walking and left leg weakness. Neurological: Negative for headaches, dizziness, or numbness. Psychiatric/Behavioral: Positive for difficulty with sleep. As per HPI      PHYSICAL EXAMINATION  Visit Vitals  /80 (BP 1 Location: Left arm, BP Patient Position: Sitting)   Pulse 66   Temp 97.9 °F (36.6 °C) (Oral)   Resp 16   Ht 5' 10\" (1.778 m)   Wt 207 lb (93.9 kg)   SpO2 97%   BMI 29.70 kg/m²       Constitutional: Awake, alert, and in no acute distress. Neurological: 1+ symmetrical DTRs in the lower extremities. Sensation to light touch is intact. Negative Malloy's sign bilaterally. Skin: warm, dry, and intact. Musculoskeletal: Pain with extension, axial loading, and forward flexion.  Difficulty transitioning from sit to stand. No pain with internal or external rotation of his hips. Positive straight leg raise on the left; negative on the right. bilaterally. Patient ambulates with the assistance of a rolling walking. Hip Flex  Quads Hamstrings Ankle DF EHL Ankle PF   Right +4/5 +4/5 +4/5 +4/5 +4/5 +4/5   Left  4/5  4/5  4/5  4/5  4/5  4/5     IMAGING:    Lumbar spine 2V x-rays from 7/23/2020 were personally reviewed with the patient and demonstrated:  Mild dextro scoliosis. Multilevel bridging osteophytes on the left. Multilevel degenerative discs and facets. 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 Atherosclerosis in the aorta. Lumbar spine MRI from 12/17/2018 was personally reviewed with the patient and demonstrated:          Results from [facility] on 12/17/18   MRI LUMB SPINE W WO CONT     Narrative Sagittal and axial multisequence MR images of lumbar spine were obtained without  and with 19 cc Dotarem gadolinium IV contrast.     HISTORY: Chronic low back pain with bilateral leg weakness. Also fell in tub  tablet 2018. History of prostate cancer with lumbar surgery in 1982.     COMPARISON: July 23, 2014 MRI.     Straightening of the lumbar lordosis, stable. Trace retrolisthesis L5 and L4,  stable. No compression fracture or pathologic marrow signal. Heterogeneous fatty  elements in the vertebral body bone marrow, without pathologic marrow signal.     Edema within the spinous processes of L4-L5 and L5-S1, less severe at L2-L3. 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     Conus medullaris ends at L1 with normal morphology and signal intensity. No  abnormal enhancement. Presumed bilateral renal cysts. Stable. Partially imaged  however.     T12-L1: No abnormalities.     L1-L2: Mild posterior lateral corner disc protrusion with left posterior lateral  annular tear. No central stenosis. Facet hypertrophy with no significant  foraminal stenosis.     L2-L3: Posterior disc bulge and small central disc protrusion.  Facet ligamentous  hypertrophy. Posterior epidural fat. AP canal measures 10.4 mm. No central  stenosis. Mild bilateral foraminal narrowing with no nerve root compression.     L3-L4: Posterior disc bulge. Mild facet ligamentous hypertrophy. No significant  central stenosis. Mild foraminal narrowing with no nerve root compression.     L4-L5: Posterior disc bulge with posterior lateral disc protrusion and left  annular tear. No significant central stenosis. Facet and ligamentous hypertrophy  with mild foraminal narrowing with no nerve root compression.     L5-S1: Posterior disc bulge. Heterogeneous left lateral anterior epidural  scar/fat, with mild left lateral displacement of the left descending S1 nerve  root. Facet and ligamentous hypertrophy. Severe bilateral foraminal stenosis  with compression of bilateral exiting L5 nerve roots. Similar to prior study. More scarring tissues with sclerosis at the left posterior lamina, with mild  mass effect to the left posterior lateral thecal sac.        Impression IMPRESSION:  1. Most severe disease at L5-S1, with L5 foraminal stenosis and exiting L5 nerve  root compression bilaterally. Left anterior epidural scar with mild left lateral  displacement of the left descending S1 nerve root. 2. Additional findings as described. Written by Servando Arguello, as dictated by Luigi Kirkpatrick MD.  I, Dr. Luigi Kirkpatrick confirm that all documentation is accurate.

## 2020-07-23 NOTE — PATIENT INSTRUCTIONS
Herniated Disc: Exercises  Introduction  Here are some examples of exercises for you to try. The exercises may be suggested for a condition or for rehabilitation. Start each exercise slowly. Ease off the exercises if you start to have pain. You will be told when to start these exercises and which ones will work best for you. How to stay safe  These exercises can help you move easier and feel better. But when you first start doing them, you may have more pain in your back. This is normal. But it is important to pay close attention to your pain during and after each exercise. · Keep doing these exercises if your pain stays the same or moves from your leg and buttock more toward the middle of your spine. Pain moving out of your leg and buttock is a good sign. · Stop doing these exercises if your pain gets worse in your leg and buttock. Stop if you start to have pain in your leg and buttock that you didn't have before. Be sure to do these exercises in the order they appear. Note how your pain changes before you move to the next one. If your pain is much worse right after exercise and stays worse the next day, do not do any of these exercises. How to do the exercises  1. Rest on belly   1. Lie on your stomach, with your head turned to the side. 2. Try to relax your lower back muscles as much as you can. 3. Continue to lie on your stomach for 2 minutes. · Keep your arms beside your body. · If that position bothers your neck, place your hands, one on top of the other, underneath your forehead. This will help support your head and neck. If lying in this position causes or increases pain down your leg, stop this exercise and do not do the next exercises. 2. Press-up back extension   1. Lie on your stomach, with your face down and your elbows tucked into your sides and under your shoulders. 2. Press your elbows down into the floor to raise your upper back.   3. Hold this position for 15 to 30 seconds. 4. Repeat 2 to 4 times. · As you do this, relax your stomach muscles and allow your back to arch without using your back muscles. · Let your low back relax completely as you arch up. Don't let your hips or pelvis come off the floor. Then relax, and return to the start position. Over time, work up to staying in the press-up position for up to 2 minutes. If lying in this position causes or increases pain down your leg, stop this exercise and do not do the next exercises. 3. Full press-up back extension   1. Lie on your stomach with your face down, keeping your elbows tucked into your sides and under your shoulders. 2. Straighten your elbows, and push your upper body up as far as you can. 3. Hold this position for 5 seconds, and then relax. 4. Repeat 10 times. Allow your lower back to sag. Keep your hips, pelvis, and legs relaxed. Each time, try to raise your upper body a little higher and hold your arms a bit straighter. If lying in this position causes or increases pain down your leg, stop this exercise and do not do the next exercises. If you can't do this exercise, you may instead try the backward bend exercise that follows. 4. Backward bend   1. Stand with your feet hip-width apart, and don't lock your knees. 2. Place your hands in the small of your back. 3. Bend backward as far as you can, keeping your knees straight. 4. Repeat 2 to 4 times. Your toes should be pointing forward. Hold this position for 2 to 3 seconds. Then return to your starting position. Each time, try to bend backward a little farther, until you bend backward as far as you can. If standing in this position causes or increases pain down your leg, stop this exercise. Follow-up care is a key part of your treatment and safety. Be sure to make and go to all appointments, and call your doctor if you are having problems.  It's also a good idea to know your test results and keep a list of the medicines you take.  Where can you learn more? Go to http://slava-todd.info/  Enter Z594 in the search box to learn more about \"Herniated Disc: Exercises. \"  Current as of: March 2, 2020               Content Version: 12.5  © 1438-1852 [employer]. Care instructions adapted under license by [employer] (which disclaims liability or warranty for this information). If you have questions about a medical condition or this instruction, always ask your healthcare professional. [address] any warranty or liability for your use of this information.

## 2020-07-23 NOTE — PROGRESS NOTES
Provided VORB as per Dr. Kyle Plummer for patient's IV Contrast Prep for Prednisone 50 mg Take 1 tab PO 13 hrs prior to MRI (9pm on 07/23/20), 1 tab 7 hrs prior to MRI (3am on 07/24/20), and 1 tab 1 hr prior to MRI (9am on 07/24/20); Benadryl 50 mg Take 1 cap PO 7 hrs prior to MRI (3am on 07/24/20) and 1 cap PO 1 hr prior to MRI (9am on 07/24/20); Per Mellisa Medina, pharmacist, Ranitidine has been pulled off of the market, unable to obtain, also famotidine is unavailable. Per Dr. Kyle Plummer ok to cancel the Ranitidine portion of the IV Contrast Prep. Mellisa Medina verbalized agreement/understanding.

## 2020-07-24 ENCOUNTER — HOSPITAL ENCOUNTER (OUTPATIENT)
Dept: MRI IMAGING | Age: 75
Discharge: HOME | End: 2020-07-24
Attending: PHYSICAL MEDICINE & REHABILITATION
Payer: MEDICARE

## 2020-07-24 DIAGNOSIS — M54.50 LUMBAR PAIN: ICD-10-CM

## 2020-07-24 DIAGNOSIS — M54.42 ACUTE MIDLINE LOW BACK PAIN WITH LEFT-SIDED SCIATICA: ICD-10-CM

## 2020-07-24 DIAGNOSIS — M47.816 LUMBAR SPONDYLOSIS: ICD-10-CM

## 2020-07-24 LAB — CREAT UR-MCNC: 1.3 MG/DL (ref 0.6–1.3)

## 2020-07-24 PROCEDURE — 74011250636 HC RX REV CODE- 250/636: Performed by: PHYSICAL MEDICINE & REHABILITATION

## 2020-07-24 PROCEDURE — A9575 INJ GADOTERATE MEGLUMI 0.1ML: HCPCS | Performed by: PHYSICAL MEDICINE & REHABILITATION

## 2020-07-24 PROCEDURE — 72158 MRI LUMBAR SPINE W/O & W/DYE: CPT

## 2020-07-24 PROCEDURE — 82565 ASSAY OF CREATININE: CPT

## 2020-07-24 RX ORDER — GADOTERATE MEGLUMINE 376.9 MG/ML
20 INJECTION INTRAVENOUS
Status: COMPLETED | OUTPATIENT
Start: 2020-07-24 | End: 2020-07-24

## 2020-07-24 RX ADMIN — GADOTERATE MEGLUMINE 20 ML: 376.9 INJECTION INTRAVENOUS at 11:21

## 2020-08-03 ENCOUNTER — VIRTUAL VISIT (OUTPATIENT)
Dept: ORTHOPEDIC SURGERY | Age: 75
End: 2020-08-03

## 2020-08-03 DIAGNOSIS — H54.8 LEGALLY BLIND: ICD-10-CM

## 2020-08-03 DIAGNOSIS — G89.29 OTHER CHRONIC PAIN: ICD-10-CM

## 2020-08-03 DIAGNOSIS — M54.42 ACUTE MIDLINE LOW BACK PAIN WITH LEFT-SIDED SCIATICA: ICD-10-CM

## 2020-08-03 DIAGNOSIS — M47.816 LUMBAR SPONDYLOSIS: ICD-10-CM

## 2020-08-03 DIAGNOSIS — Z79.891 USE OF OPIATES FOR THERAPEUTIC PURPOSES: ICD-10-CM

## 2020-08-03 DIAGNOSIS — M51.36 ANNULAR TEAR OF LUMBAR DISC: Primary | ICD-10-CM

## 2020-08-03 NOTE — PATIENT INSTRUCTIONS
Low Back Arthritis: Exercises Introduction Here are some examples of typical rehabilitation exercises for your condition. Start each exercise slowly. Ease off the exercise if you start to have pain. Your doctor or physical therapist will tell you when you can start these exercises and which ones will work best for you. When you are not being active, find a comfortable position for rest. Some people are comfortable on the floor or a medium-firm bed with a small pillow under their head and another under their knees. Some people prefer to lie on their side with a pillow between their knees. Don't stay in one position for too long. Take short walks (10 to 20 minutes) every 2 to 3 hours. Avoid slopes, hills, and stairs until you feel better. Walk only distances you can manage without pain, especially leg pain. How to do the exercises Pelvic tilt 1. Lie on your back with your knees bent. 2. \"Brace\" your stomachtighten your muscles by pulling in and imagining your belly button moving toward your spine. 3. Press your lower back into the floor. You should feel your hips and pelvis rock back. 4. Hold for 6 seconds while breathing smoothly. 5. Relax and allow your pelvis and hips to rock forward. 6. Repeat 8 to 12 times. Back stretches 1. Get down on your hands and knees on the floor. 2. Relax your head and allow it to droop. Round your back up toward the ceiling until you feel a nice stretch in your upper, middle, and lower back. Hold this stretch for as long as it feels comfortable, or about 15 to 30 seconds. 3. Return to the starting position with a flat back while you are on your hands and knees. 4. Let your back sway by pressing your stomach toward the floor. Lift your buttocks toward the ceiling. 5. Hold this position for 15 to 30 seconds. 6. Repeat 2 to 4 times. Follow-up care is a key part of your treatment and safety.  Be sure to make and go to all appointments, and call your doctor if you are having problems. It's also a good idea to know your test results and keep a list of the medicines you take. Where can you learn more? Go to http://slava-todd.info/ Enter M438 in the search box to learn more about \"Low Back Arthritis: Exercises. \" Current as of: March 2, 2020               Content Version: 12.5 © 2006-2020 Healthwise, [employer]. Care instructions adapted under license by [employer] (which disclaims liability or warranty for this information). If you have questions about a medical condition or this instruction, always ask your healthcare professional. [address] any warranty or liability for your use of this information.

## 2020-08-03 NOTE — PROGRESS NOTES
[facility]  [address] [place] [place], [address]  Phone: [phone]  Fax: [phone]    Pt's YOB: 1945    ASSESSMENT   Diagnoses and all orders for this visit:    1. Annular tear of lumbar disc    2. Acute midline low back pain with left-sided sciatica    3. Lumbar spondylosis    4. Other chronic pain    5. Use of opiates for therapeutic purposes    6. Legally blind         IMPRESSION AND PLAN:  Dmitriy Jones is a 66 y.o. male with history of lumbar pain. Pt admits to electrical like pain radiating down the left leg to the calf but notes improvement since his last office visit. He takes Tylenol #3 up to 4 tabs daily as his pain warrants with benefit. 1) Pt was given information on lumbar arthritis exercises. 2) He will continue taking Tylenol #3 as prescribed and he will contact the office if he needs a refill. 3) Mr. Jana Larsen has a reminder for a \"due or due soon\" health maintenance. I have asked that he contact his primary care provider, Patricio Sánchez MD, for follow-up on this health maintenance. 4)  demonstrated consistency with prescribing. 5) Last UDS from 07/02/2019 was consistent. Follow-up and Dispositions    · Return in about 2 months (around 10/3/2020) for Medication follow up. CPT Codes 78074-95126 for Established Patients may apply to this TeleHealth Visit. Time-based coding, delete if not needed: I spent 11 minutes and 58 seconds from 10:10 AM to 10:22 AM with this established patient, and >50% of the time was spent counseling and/or coordinating care regarding his symptoms, lumbar MRI, and medications. Due to this being a TeleHealth evaluation, many elements of the physical examination are unable to be assessed.      Pursuant to the emergency declaration under the [facility]5 waiver authority and the [address] Appropriations Act, this Virtual Visit was conducted, with patient's consent, to reduce the patient's risk of exposure to COVID-19 and provide continuity of care for an established patient. Services were provided through a telephone discussion virtually to substitute for in-person clinic visit. HISTORY OF PRESENT ILLNESS:  Vinod Farris is a 66 y.o. male with history of lumbar pain and was evaluated from his residence by telephone at the [facility] location on 8/3/2020 with his verbal consent for MRI follow up. Pt admits to electrical like pain radiating down the left leg to the calf but notes significant improvement since his last office visit. Pt notes that he is able to ambulate with less difficulty and is performing ADL's. He currently rates his pain at a 5/10 and notes that with his recenty acute exacerbation of pain increased to a 7/10 pain. Pt reports that when his pain was severe he had difficulty laying down to sleep secondary to pain. He takes Tylenol #3 up to 4 tabs daily as his pain warrants with benefit. MME is 9-18 and his medication is secure. Pt admits to intermittent constipation but notes relief when taking Miralax. He completed a large prednisone taper yesterday and admits to difficulty sleeping with the medication. Pt denies any palpitations with the prednisone. Pt at this time desires to continue with current care.     Pain Scale: /10    PCP: Pooja Pagan MD     Past Medical History:   Diagnosis Date    Allergy     Anxiety     ED (erectile dysfunction)     Elevated prostate specific antigen (PSA)     Essential hypertension     Essential tremor     Gout     Hypogonadism in male     Hypotestosteronism     Ill-defined condition     ESSENTIAL TREMORS    Legally blind     Nocturia     Nodular prostate without urinary obstruction     Panic attack     Prostate cancer ([facility] [address] 2/14/13    PNBx-T2a - Prostatic Volume: 26.7 grams - [place] 6 (3 + 3) -  Dr. Yulissa Carnes Thyroid disease     small tumor        Social History     Socioeconomic History    Marital status:      Spouse name: Not on file    Number of children: Not on file    Years of education: Not on file    Highest education level: Not on file   Occupational History    Not on file   Social Needs    Financial resource strain: Not on file    Food insecurity     Worry: Not on file     Inability: Not on file    Transportation needs     Medical: Not on file     Non-medical: Not on file   Tobacco Use    Smoking status: Never Smoker    Smokeless tobacco: Never Used   Substance and Sexual Activity    Alcohol use: Yes     Comment: seldom    Drug use: No    Sexual activity: Yes   Lifestyle    Physical activity     Days per week: Not on file     Minutes per session: Not on file    Stress: Not on file   Relationships    Social connections     Talks on phone: Not on file     Gets together: Not on file     Attends Protestant service: Not on file     Active member of club or organization: Not on file     Attends meetings of clubs or organizations: Not on file     Relationship status: Not on file    Intimate partner violence     Fear of current or ex partner: Not on file     Emotionally abused: Not on file     Physically abused: Not on file     Forced sexual activity: Not on file   Other Topics Concern    Not on file   Social History Narrative    Not on file       Current Outpatient Medications   Medication Sig Dispense Refill    acetaminophen-codeine (Tylenol-Codeine #3) 300-30 mg per tablet Take 1 Tab by mouth every six to eight (6-8) hours as needed for Pain for up to 30 days. Max Daily Amount: 4 Tabs. 90 Tab 0    diclofenac (VOLTAREN) 1 % gel Apply  to affected area four (4) times daily.  Apply to right hip 4 times per day as directed 5 Each 3    predniSONE (DELTASONE) 10 mg tablet 6 pills Day 1, 5 pills Day 2, 4 pills Day 3&4, 3 pills Day 5&6, 2 pills Day 7&8, 1 pill Day 9&10, 1/2 pill Day 11&12 32 Tab 0  predniSONE (DELTASONE) 50 mg tablet Take 1 tab PO 13 hrs prior to MRI (9pm on 07/23/20), 1 tab 7 hrs prior to MRI (3am on 07/24/20), and 1 tab 1 hr prior to MRI (9am on 07/24/20) 3 Tab 0    diphenhydrAMINE (BENADRYL) 50 mg capsule Take 1 cap PO 7 hrs prior to MRI (3am on 07/24/20) and 1 cap PO 1 hr prior to MRI (9am on 07/24/20) 2 Cap 0    allopurinol (ZYLOPRIM) 100 mg tablet       alprostadil (MUSE) 1,000 mcg supp 1,000 mcg by IntraURETHral route as needed (for ED.). 6 Suppository 1    polyethylene glycol (MIRALAX) 17 gram/dose powder Take 17 g by mouth daily as needed for Other (constipation). 510 g 5    alprostadil (MUSE) 500 mcg supp 500 mcg by Urethral route as needed. 6 Suppository 1    hydrocortisone (ANUSOL-HC) 2.5 % rectal cream Insert  into rectum two (2) times a day. Use as needed as directed 90 g 1    butenafine (MENTAX) 1 % topical cream Mentax 1 % External Cream  APPLY SPARINGLY TO AFFECTED AREA(S) ONCE DAILY   Quantity: 60       Herminia Zambrano M.D.;  Started 12-Feb-2009  Active      fluticasone (FLONASE) 50 mcg/actuation nasal spray 2 Sprays by Both Nostrils route daily.  DULoxetine (CYMBALTA) 30 mg capsule Take 30 mg by mouth daily.  NIFEDIPINE (ADALAT CC PO) Take 60 mg by mouth daily.  SPIRONOLACTONE PO Take 25 mg by mouth two (2) times a day.  TOPIRAMATE (TOPAMAX PO) Take 100 mg by mouth two (2) times a day. Indications: TREMORS      acetaminophen-codeine (TYLENOL #3) 300-30 mg per tablet Take 1 Tab by mouth two (2) times daily as needed. Allergies   Allergen Reactions    Contrast Agent [Iodine] Hives    Iodinated Contrast Media Itching         REVIEW OF SYSTEMS    Constitutional: Negative for fever, chills, or weight change. Respiratory: Negative for cough or shortness of breath. Cardiovascular: Negative for chest pain or palpitations. Gastrointestinal: Negative for acid reflux, change in bowel habits, or constipation.   Genitourinary: Negative for dysuria and flank pain. Musculoskeletal: Positive for lumbar and left leg pain. Neurological: Negative for headaches, dizziness, or numbness. Psychiatric/Behavioral: Negative for difficulty with sleep. As per HPI      IMAGING:    Lumbar spine MRI from 07/24/2020 were personally reviewed with the patient and demonstrated:  Results from [place] encounter on 07/24/20   MRI LUMB SPINE W WO CONT    Narrative Sagittal and axial multisequence MR images of lumbar spine were obtained without  and with 20 cc Dotarem gadolinium IV contrast    HISTORY: Back pain and left sciatica. COMPARISON: August 6, 2010    Loss of lumbar lordosis, similar to before. Trace retrolisthesis L5. No focal compression fracture. Patchy fat signal in the bone marrow with no  pathologic marrow signal abnormalities. No abnormal enhancement. Conus  medullaris ends at L1 with normal morphology and signal intensity. Multiple large left renal cysts. L1-L2: Posterior lateral corner disc protrusion. No central stenosis. Mild facet  and ligamentous hypertrophy. Mild foraminal narrowing with no nerve root  compression. L2-L3: Posterior disc bulge and small left paracentral disc protrusion. No  significant central stenosis. Facet and ligamentous hypertrophy. Mild foraminal  narrowing with no nerve root compression. L3-L4: Mild posterior disc bulge. Facet and ligamentous hypertrophy. Posterior  epidural fat present. AP canal measures 9.9 mm  No significant central stenosis. Mild foraminal narrowing with no nerve root compression. L4-L5: Mild posterior disc bulge with left lateral disc protrusion/posterior  lateral annular tear. Facet and ligamentous hypertrophy. No significant central  stenosis. Mild to moderate foraminal stenosis but no definite exiting nerve root  compression. Possible mild arachnoiditis    L5-S1: Left laminotomy. Mild posterior disc bulge and endplate spondylosis,  slightly worse on the left.  No central stenosis. Right facet and ligamentous  hypertrophy. Moderate right foraminal stenosis with compression of the right  exiting L5 nerve root. Mild-to-moderate left foraminal stenosis, mild  compression of left exiting L5 nerve root also possible. Impression IMPRESSION: Degenerative disease worst at L4-L5 and L5-S1, with more severe  foraminal stenosis and nerve root compression at L5-S1. Right slightly worse  than left. However, patient is more symptomatic on the left. Written by Wade Dias, as dictated by Louie Brown MD.  I, Dr. Louie Brown confirm that all documentation is accurate.

## 2020-08-31 ENCOUNTER — TELEPHONE (OUTPATIENT)
Dept: ORTHOPEDIC SURGERY | Age: 75
End: 2020-08-31

## 2020-08-31 NOTE — TELEPHONE ENCOUNTER
----- Message from Yesika Johnson MD sent at 8/31/2020  7:34 AM EDT -----  Please make sure pt has a follow up -- he is blind and  and depends on family/friends for transportation -- thanks  ----- Message -----  From: Too Danielle Results In  Sent: 7/25/2020   2:53 PM EDT  To: Yesika Johnson MD

## 2020-09-09 DIAGNOSIS — Z79.891 USE OF OPIATES FOR THERAPEUTIC PURPOSES: ICD-10-CM

## 2020-09-09 DIAGNOSIS — M54.42 ACUTE MIDLINE LOW BACK PAIN WITH LEFT-SIDED SCIATICA: Primary | ICD-10-CM

## 2020-09-09 DIAGNOSIS — G89.29 OTHER CHRONIC PAIN: ICD-10-CM

## 2020-09-09 RX ORDER — ACETAMINOPHEN AND CODEINE PHOSPHATE 300; 30 MG/1; MG/1
1 TABLET ORAL
Qty: 90 TAB | Refills: 0 | OUTPATIENT
Start: 2020-09-09 | End: 2020-10-09

## 2020-09-09 NOTE — TELEPHONE ENCOUNTER
Last Visit: 8/3/20 with MD Bea Donohue  Next Appointment: 10/14/20 with MD Bea Donohue  Previous Refill Encounter(s): 7/23/20 #90    Requested Prescriptions     Pending Prescriptions Disp Refills    acetaminophen-codeine (TYLENOL #3) 300-30 mg per tablet 90 Tab 0     Sig: Take 1 Tab by mouth every six to eight (6-8) hours as needed for Pain for up to 30 days. Max Daily Amount: 4 Tabs.

## 2020-09-10 NOTE — TELEPHONE ENCOUNTER
Last Rx written on 2020 with no refills. Returned call to patient, verified Name/, patient scheduled sooner appt in office at [facility] location to see Dr. Araseli Shannon on 2020 @ [phone]. Patient unable to schedule sooner d/t transportation issues. Patient would like Rx for Tylenol #3 to make it through to his next appt. Please review/advise.

## 2020-09-11 RX ORDER — ACETAMINOPHEN AND CODEINE PHOSPHATE 300; 30 MG/1; MG/1
1 TABLET ORAL
Qty: 90 TAB | Refills: 0 | Status: SHIPPED | OUTPATIENT
Start: 2020-09-11 | End: 2020-10-11

## 2020-09-23 ENCOUNTER — OFFICE VISIT (OUTPATIENT)
Dept: ORTHOPEDIC SURGERY | Age: 75
End: 2020-09-23
Payer: MEDICARE

## 2020-09-23 VITALS
TEMPERATURE: 98.1 F | DIASTOLIC BLOOD PRESSURE: 71 MMHG | RESPIRATION RATE: 18 BRPM | HEIGHT: 70 IN | BODY MASS INDEX: 32.01 KG/M2 | HEART RATE: 75 BPM | OXYGEN SATURATION: 98 % | SYSTOLIC BLOOD PRESSURE: 113 MMHG | WEIGHT: 223.6 LBS

## 2020-09-23 DIAGNOSIS — N28.1 RENAL CYST: ICD-10-CM

## 2020-09-23 DIAGNOSIS — K59.03 DRUG INDUCED CONSTIPATION: ICD-10-CM

## 2020-09-23 DIAGNOSIS — M54.42 ACUTE MIDLINE LOW BACK PAIN WITH LEFT-SIDED SCIATICA: ICD-10-CM

## 2020-09-23 DIAGNOSIS — R26.9 GAIT ABNORMALITY: ICD-10-CM

## 2020-09-23 DIAGNOSIS — M47.816 LUMBAR SPONDYLOSIS: ICD-10-CM

## 2020-09-23 DIAGNOSIS — G89.29 OTHER CHRONIC PAIN: ICD-10-CM

## 2020-09-23 DIAGNOSIS — M51.36 ANNULAR TEAR OF LUMBAR DISC: Primary | ICD-10-CM

## 2020-09-23 DIAGNOSIS — Z79.891 USE OF OPIATES FOR THERAPEUTIC PURPOSES: ICD-10-CM

## 2020-09-23 DIAGNOSIS — H54.8 LEGALLY BLIND: ICD-10-CM

## 2020-09-23 PROCEDURE — G8427 DOCREV CUR MEDS BY ELIG CLIN: HCPCS | Performed by: PHYSICAL MEDICINE & REHABILITATION

## 2020-09-23 PROCEDURE — G8510 SCR DEP NEG, NO PLAN REQD: HCPCS | Performed by: PHYSICAL MEDICINE & REHABILITATION

## 2020-09-23 PROCEDURE — G0444 DEPRESSION SCREEN ANNUAL: HCPCS | Performed by: PHYSICAL MEDICINE & REHABILITATION

## 2020-09-23 PROCEDURE — 3288F FALL RISK ASSESSMENT DOCD: CPT | Performed by: PHYSICAL MEDICINE & REHABILITATION

## 2020-09-23 PROCEDURE — G8536 NO DOC ELDER MAL SCRN: HCPCS | Performed by: PHYSICAL MEDICINE & REHABILITATION

## 2020-09-23 PROCEDURE — 1100F PTFALLS ASSESS-DOCD GE2>/YR: CPT | Performed by: PHYSICAL MEDICINE & REHABILITATION

## 2020-09-23 PROCEDURE — G8417 CALC BMI ABV UP PARAM F/U: HCPCS | Performed by: PHYSICAL MEDICINE & REHABILITATION

## 2020-09-23 PROCEDURE — 99214 OFFICE O/P EST MOD 30 MIN: CPT | Performed by: PHYSICAL MEDICINE & REHABILITATION

## 2020-09-23 RX ORDER — ACETAMINOPHEN AND CODEINE PHOSPHATE 300; 30 MG/1; MG/1
1 TABLET ORAL
Qty: 90 TAB | Refills: 2 | Status: SHIPPED | OUTPATIENT
Start: 2020-10-11 | End: 2020-11-10

## 2020-09-23 NOTE — LETTER
Elijah Polo EKU:[birth year] MR #:[id number] Office:[facility] Page 1 of 5 CONTROLLED SUBSTANCE AGREEMENT I may be prescribed medications that are controlled substances as part  of my treatment plan for management of my medical condition(s). The goal of my treatment plan is to maintain and/or improve my health and wellbeing. Because controlled substances have an increased risk of abuse or harm, continual re-evaluation is needed determine if the goals of my treatment plan are being met for my safety and the safety of others. I, Frantz Serrano  am entering into this Controlled Substance Agreement with my provider, Dr. Johnny Magdaleno at [address] [place] [address] . I understand that successful treatment requires mutual trust and honesty between me and my provider. I understand that there are state and federal laws and regulations which apply to the medications that my provider may prescribe that must be followed. I understand there are risks and benefits of taking the medicines that my provider may prescribe. I understand and agree that following this Agreement is necessary in continuing my provider-patient relationship and success of my treatment plan. As a part of my treatment plan, I agree to the following: COMMUNICATION: 
 
1. I will communicate fully with my provider about my medical condition(s), including the effect on my daily life and how well my medications are helping. I will tell my provider all of the medications that I take for any reason, including medications I receive from another health care provider, and will notify my provider about all issues, problems or concerns, including any side effects, which may be related to my medications. I understand that this information allows my provider to adjust my treatment plan to help manage my medical condition.  I understand that this information will become part of my permanent medical record. 2. I will notify my provider if I have a history of alcohol/drug misuse/addiction or if I have had treatment for alcohol/drug addiction in the past, or if I have a new problem with or concern about alcohol/drug use/addiction, because this increases the likelihood of high risk behaviors and may lead to serious medical conditions. 3. Females Only: I will notify my provider if I am or become pregnant, or if I intend to become pregnant, or if I intend to breastfeed. I understand that communication of these issues with my provider is important, due to possible effects my medication could have on an unborn fetus or breastfeeding child. Initials_____ Radha Plaza GKQ:[birth year] MR #:[id number] Office:[facility] Page 2 of 5 MISUSE OF MEDICATIONS / DRUGS: 
 
1. I agree to take all controlled substances as prescribed, and will not misuse or abuse any controlled substances prescribed by my provider. For my safety, I will not increase the amount of medicine I take without first talking with and getting permission from my provider. 2. If I have a medical emergency, another health care provider may prescribe me medication. If I seek emergency treatment, I will notify my provider within seventy-two (72) hours. 3. I understand that my provider may discuss my use and/or possible misuse/abuse of controlled substances and alcohol, as appropriate, with any health care provider involved in my care, pharmacist or legal authority. ILLEGAL DRUGS: 
 
1. I will not use illegal drugs of any kind, including but not limited to marijuana, heroin, cocaine, or any prescription drug which is not prescribed to me. DRUG DIVERSION / PRESCRIPTION FRAUD: 
 
1. I will not share, sell, trade, give away, or otherwise misuse my prescriptions or medications. 2. I will not alter any prescriptions provided to me by my provider. SINGLE PROVIDER: 
 
1. I agree that all controlled substances that I take will be prescribed only by my provider (or his/her covering provider) under this Agreement. This agreement does not prevent me from seeking emergency medical treatment or receiving pain management related to a surgery. PROTECTING MEDICATIONS: 
 
1. I am responsible for keeping my prescriptions and medications in a safe and secure place including safeguarding them from loss or theft. I understand that lost, stolen or damaged/destroyed prescriptions or medications will not be replaced. Initials____ Sophia CACERES:[birth year] MR #:[id number] Office:[facility] Page 3 of 5 PRESCRIPTION RENEWALS/REFILLS: 
 
1. I will follow my controlled substance medication schedule as prescribed by my provider. 2. I understand and agree that I will make any requests for renewals or refills of my prescriptions only at the time of an office visit or during my providers regular office hours subject to the prescription refill requirements of the individual practice. 3. I understand that my provider may not call in prescriptions for controlled substances to my pharmacy. 4. I understand that my provider may adjust or discontinue these medications as deemed appropriate for my medical treatment plan. This Agreement does not guarantee the prescription of controlled medications. 5. I agree that if my medications are adjusted or discontinued, I will properly dispose of any remaining medications. I understand that I will be required to dispose of any remaining controlled medications prior to being provided with any prescriptions for other controlled medications.  
 
6. I understand that the renewal of my prescription depends on my medical condition, my consistent participation, and my adherence with my treatment plan and this Agreement. 7. I understand that if I do not keep an appointment with my provider, I may not receive a renewal or refill for my controlled substance medication. PRESCRIPTION MONITORING / DRUG TESTIN. I understand that my provider may require me to provide urine, saliva or blood for testing at any time. I understand that this testing will be used to monitor for safety and adherence with my treatment plan and this Agreement. 2. I understand that my provider may ask me to provide an observed urine specimen, which means that a nurse or other health care provider may watch me provide urine, and I agree to cooperate if I am asked to provide an observed specimen. 3. I understand that if I do not provide urine, saliva or blood samples within two (2) hours of my providers request, or other timeframe decided by my provider, my treatment plan could be changed, or my prescriptions and medications may be changed or ended. 4. I understand that urine, saliva and blood test results will be a part of my permanent medical record. Initials_____ Jade VALDES:[id number] MR #:[id number] Office:[facility] Page 4 of 5 
 
 
1. I authorize my provider and my pharmacy to cooperate fully with any local, state, or federal law enforcement agency in the investigation of any possible misuse, sale, or other diversion of my controlled substance prescriptions or medications. RISKS: 
 
1. I understand that my level of consciousness may be affected from the use of controlled substances, and I understand that there are risks, benefits, effects and potential alternatives (including no treatment) to the medications that my provider has prescribed. 2. I understand that I may become drowsy, tired, dizzy, constipated, and sick to my stomach, or have changes in my mood or in my sleep while taking my medications. I have talked with my provider about these possible side effects, risks, benefits, and alternative treatments, and my provider has answered all of my questions. 3. I understand that I should not suddenly stop taking my medications without first speaking with my provider. I understand that if I suddenly stop taking my medications, I may experience nausea, vomiting, sweating,anxiety, sleeplessness, itching or other uncomfortable feelings. 4. I will not take my medications with alcohol of any kind, including beer, wine or liquor. I understand that drinking alcohol with my medications increases the chances of side effects, including breathing problems or even death. 5. I understand that if I have a history of alcoholism or other drug addiction I may be at increased risk of addiction to my medications. Signs of addiction might include craving, compulsive use, and continued use despite harm. Since addiction is a disease, I understand my provider may decide to change my medications and refer me to appropriate treatment services. I understand that this information would become part of my permanent medical record. Initials_____ Magnus Sánchez [facility]:[birth year] MR #:[id number] Office:[facility] Page 5 of 5  
 
 
6. Females only: Children born to women who regularly take controlled substances are likely to have physical problems and suffer withdrawal symptoms at birth. If I am of child-bearing age, I understand that I should use safe and effective birth control while taking any controlled substances to avoid the impact of medications on an unborn fetus or  child. I agree to notify my provider immediately if I should become pregnant so that my treatment plan can be adjusted. 7. Males only: I understand that chronic use of controlled substances has been associated with low testosterone levels in males which may affect my mood, stamina, sexual desire, and general health. I understand that my provider may order the appropriate laboratory test to determine my testosterone level,and I agree to this testing. ADHERENCE: 
 
1. I understand that if I do not adhere to this Agreement in any way, my provider may change my prescriptions, stop prescribing controlled substances or end our provider-patient relationship. 2. If my provider decides to stop prescribing medication, or decides to end our provider-patient relationship,my provider may require that I taper my medications slowly. If necessary, my provider may also provide a prescription for other medications to treat my withdrawal symptoms. UNDERSTANDING THIS AGREEMENT: 
 
I understand that my provider may adjust or stop my prescriptions for controlled substances based on my medical condition and my treatment plan. I understand that this Agreement does not guarantee that I will be prescribed medications or controlled substances. I understand that controlled substances may be just one part 
of my treatment plan.  
 
My initial on each page and my signature below shows that I have read each page of this Agreement, I have had an opportunity to ask questions, and all of my questions have been answered to my satisfaction by my provider. By signing below, I agree to comply with this Agreement, and I understand that if I do not follow the Agreements listed above, my provider may stop 
 
_________________________________________  Date/Time 9/23/2020 10:18 AM   
             (Patient Signature) 
 
_________________________________________ Date/Time 9/23/2020 10:18 AM 
 (Provider Signature)

## 2020-09-23 NOTE — PROGRESS NOTES
[address] [facility]  Denise Hanna., [address] [place] [address], [facility] [address]  Phone: [phone]  Fax: [phone]    Pt's YOB: 1945    ASSESSMENT   Diagnoses and all orders for this visit:    1. Annular tear of lumbar disc    2. Acute midline low back pain with left-sided sciatica  -     acetaminophen-codeine (Tylenol-Codeine #3) 300-30 mg per tablet; Take 1 Tab by mouth every six to eight (6-8) hours as needed for Pain for up to 30 days. Max Daily Amount: 4 Tabs. 3. Other chronic pain  -     DRUG SCREEN UR - W/ CONFIRM; Future  -     acetaminophen-codeine (Tylenol-Codeine #3) 300-30 mg per tablet; Take 1 Tab by mouth every six to eight (6-8) hours as needed for Pain for up to 30 days. Max Daily Amount: 4 Tabs. 4. Use of opiates for therapeutic purposes  -     DRUG SCREEN UR - W/ CONFIRM; Future  -     acetaminophen-codeine (Tylenol-Codeine #3) 300-30 mg per tablet; Take 1 Tab by mouth every six to eight (6-8) hours as needed for Pain for up to 30 days. Max Daily Amount: 4 Tabs. 5. Lumbar spondylosis    6. Drug induced constipation    7. Legally blind    8. Renal cyst    9. Gait abnormality         IMPRESSION AND PLAN:  Zoila Sim is a 66 y.o. male with history of chronic lumbar pain. He denies any change in symptoms since his last office visit. Pt takes Tylenol #3 1 tab 3-4 x daily as his pain warrants with benefit. 1) Pt was given information on lumbar arthritis exercises. 2) He received a refill of Tylenol #3 1 tab Q6-8 hours prn pain. 3) A UDS was obtained. 4) Mr. Scar Louie has a reminder for a \"due or due soon\" health maintenance. I have asked that he contact his primary care provider, Brad Beltran MD, for follow-up on this health maintenance. 5)  demonstrated consistency with prescribing. 6) Last UDS from 7/2/2019 was consistent.   7) Pt will continue to use Miralax for his constipation  8) Pt will use stool softeners if needed  9) Pt encouraged to be active regularly--he has an exercise bike and encouraged to use it regularly  Follow-up and Dispositions    · Return in about 3 months (around 12/15/2020) for Medication follow up. HISTORY OF PRESENT ILLNESS:  Troy Bernal is a 66 y.o. male with history of chronic lumbar pain and presents to the office today for follow up. He denies any change in symptoms since his last office visit. Pt has been prescribed Tylenol #3 and takes 1 tab 3-4 x daily as his pain warrants with benefit. Of note, patient's MME/day is 9-13.5. He admits to occasional constipation with the Tylenol #3 but notes relief when taking Miralax. Pt notes that he is scheduled to follow up with his PCP, Win Romero MD, next month. He last had a colonoscopy 5 years ago and notes that he will discussed scheduling another colonoscopy with Dr. Rosa Olmos. Pt notes that he likes to use a stationary bicycle at home for exercise. He denies leg weakness at this time and reports increased strength in in his legs since using his stationary bicycle. Pt at this time desires to continue with current care.     Pain Scale: /10    PCP: Win Romero MD     Past Medical History:   Diagnosis Date    Allergy     Anxiety     ED (erectile dysfunction)     Elevated prostate specific antigen (PSA)     Essential hypertension     Essential tremor     Gout     Hypogonadism in male     Hypotestosteronism     Ill-defined condition     ESSENTIAL TREMORS    Legally blind     Nocturia     Nodular prostate without urinary obstruction     Panic attack     Prostate cancer ([facility] [address] 2/14/13    PNBx-T2a - Prostatic Volume: 26.7 grams - [place] 6 (3 + 3) -  Dr. Timothy Meredith Thyroid disease     small tumor        Social History     Socioeconomic History    Marital status:      Spouse name: Not on file    Number of children: Not on file    Years of education: Not on file    Highest education level: Not on file Occupational History    Not on file   Social Needs    Financial resource strain: Not on file    Food insecurity     Worry: Not on file     Inability: Not on file    Transportation needs     Medical: Not on file     Non-medical: Not on file   Tobacco Use    Smoking status: Never Smoker    Smokeless tobacco: Never Used   Substance and Sexual Activity    Alcohol use: Yes     Comment: seldom    Drug use: No    Sexual activity: Yes   Lifestyle    Physical activity     Days per week: Not on file     Minutes per session: Not on file    Stress: Not on file   Relationships    Social connections     Talks on phone: Not on file     Gets together: Not on file     Attends Taoist service: Not on file     Active member of club or organization: Not on file     Attends meetings of clubs or organizations: Not on file     Relationship status: Not on file    Intimate partner violence     Fear of current or ex partner: Not on file     Emotionally abused: Not on file     Physically abused: Not on file     Forced sexual activity: Not on file   Other Topics Concern    Not on file   Social History Narrative    Not on file       Current Outpatient Medications   Medication Sig Dispense Refill    [START ON 10/11/2020] acetaminophen-codeine (Tylenol-Codeine #3) 300-30 mg per tablet Take 1 Tab by mouth every six to eight (6-8) hours as needed for Pain for up to 30 days. Max Daily Amount: 4 Tabs. 90 Tab 2    acetaminophen-codeine (Tylenol-Codeine #3) 300-30 mg per tablet Take 1 Tab by mouth every six to eight (6-8) hours as needed for Pain for up to 30 days. Max Daily Amount: 4 Tabs. 90 Tab 0    diclofenac (VOLTAREN) 1 % gel Apply  to affected area four (4) times daily.  Apply to right hip 4 times per day as directed 5 Each 3    diphenhydrAMINE (BENADRYL) 50 mg capsule Take 1 cap PO 7 hrs prior to MRI (3am on 07/24/20) and 1 cap PO 1 hr prior to MRI (9am on 07/24/20) 2 Cap 0    allopurinol (ZYLOPRIM) 100 mg tablet       alprostadil (MUSE) 1,000 mcg supp 1,000 mcg by IntraURETHral route as needed (for ED.). 6 Suppository 1    polyethylene glycol (MIRALAX) 17 gram/dose powder Take 17 g by mouth daily as needed for Other (constipation). 510 g 5    alprostadil (MUSE) 500 mcg supp 500 mcg by Urethral route as needed. 6 Suppository 1    hydrocortisone (ANUSOL-HC) 2.5 % rectal cream Insert  into rectum two (2) times a day. Use as needed as directed 90 g 1    butenafine (MENTAX) 1 % topical cream Mentax 1 % External Cream  APPLY SPARINGLY TO AFFECTED AREA(S) ONCE DAILY   Quantity: 60       Mariela Ac M.D.;  Started 12-Feb-2009  Active      fluticasone (FLONASE) 50 mcg/actuation nasal spray 2 Sprays by Both Nostrils route daily.  DULoxetine (CYMBALTA) 30 mg capsule Take 30 mg by mouth daily.  NIFEDIPINE (ADALAT CC PO) Take 60 mg by mouth daily.  SPIRONOLACTONE PO Take 25 mg by mouth two (2) times a day.  TOPIRAMATE (TOPAMAX PO) Take 100 mg by mouth two (2) times a day. Indications: TREMORS         Allergies   Allergen Reactions    Contrast Agent [Iodine] Hives    Iodinated Contrast Media Itching         REVIEW OF SYSTEMS    Constitutional: Negative for fever, chills, or weight change. Respiratory: Negative for cough or shortness of breath. Cardiovascular: Negative for chest pain or palpitations. Gastrointestinal: Negative for acid reflux or change in bowel habits. Positive for constipation. Genitourinary: Negative for dysuria and flank pain. Musculoskeletal: Positive for lumbar pain and left leg weakness. Neurological: Negative for headaches, dizziness, or numbness. Psychiatric/Behavioral: Negative for difficulty with sleep. As per HPI     PHYSICAL EXAMINATION  Visit Vitals  /71   Pulse 75   Temp 98.1 °F (36.7 °C) (Oral)   Resp 18   Ht 5' 10\" (1.778 m)   Wt 223 lb 9.6 oz (101.4 kg)   SpO2 98%   BMI 32.08 kg/m²       Constitutional: Awake, alert, and in no acute distress.   Neurological: 1+ symmetrical DTRs in the upper extremities. 1+ symmetrical DTRs in the lower extremities. Sensation to light touch is intact. Negative Malloy's sign bilaterally. Skin: warm, dry, and intact. Musculoskeletal: Tenderness to palpation in the lower lumbar region. Moderate pain with extension and axial loading. Improvement with forward flexion. No pain with internal or external rotation of his hips. Negative straight leg raise bilaterally. Patient ambulates with the assistance of a rolling walker. Hip Flex  Quads Hamstrings Ankle DF EHL Ankle PF   Right +4/5 +4/5 +4/5 +4/5 +4/5 +4/5   Left +4/5 +4/5 +4/5 -4/5 +4/5 -4/5     IMAGING:    Lumbar spine MRI from 07/24/2020 were personally reviewed with the patient and demonstrated:       Results from [place] encounter on 07/24/20   MRI LUMB SPINE W WO CONT     Narrative Sagittal and axial multisequence MR images of lumbar spine were obtained without  and with 20 cc Dotarem gadolinium IV contrast     HISTORY: Back pain and left sciatica.     COMPARISON: August 6, 2010     Loss of lumbar lordosis, similar to before. Trace retrolisthesis L5.     No focal compression fracture. Patchy fat signal in the bone marrow with no  pathologic marrow signal abnormalities. No abnormal enhancement. Conus  medullaris ends at L1 with normal morphology and signal intensity.     Multiple large left renal cysts.     L1-L2: Posterior lateral corner disc protrusion. No central stenosis. Mild facet  and ligamentous hypertrophy. Mild foraminal narrowing with no nerve root  compression.     L2-L3: Posterior disc bulge and small left paracentral disc protrusion. No  significant central stenosis. Facet and ligamentous hypertrophy. Mild foraminal  narrowing with no nerve root compression.     L3-L4: Mild posterior disc bulge. Facet and ligamentous hypertrophy. Posterior  epidural fat present. AP canal measures 9.9 mm  No significant central stenosis.   Mild foraminal narrowing with no nerve root compression.     L4-L5: Mild posterior disc bulge with left lateral disc protrusion/posterior  lateral annular tear. Facet and ligamentous hypertrophy. No significant central  stenosis. Mild to moderate foraminal stenosis but no definite exiting nerve root  compression. Possible mild arachnoiditis     L5-S1: Left laminotomy. Mild posterior disc bulge and endplate spondylosis,  slightly worse on the left. No central stenosis. Right facet and ligamentous  hypertrophy. Moderate right foraminal stenosis with compression of the right  exiting L5 nerve root. Mild-to-moderate left foraminal stenosis, mild  compression of left exiting L5 nerve root also possible.        Impression IMPRESSION: Degenerative disease worst at L4-L5 and L5-S1, with more severe  foraminal stenosis and nerve root compression at L5-S1. Right slightly worse  than left. However, patient is more symptomatic on the left. Written by Evelin Mojica, as dictated by Mary Bradley MD.  I, Dr. Mary Bradley confirm that all documentation is accurate.

## 2020-09-23 NOTE — LETTER
9/25/20 Patient: Deepika Moreno YOB: 1945 Date of Visit: 9/23/2020 Nieves Cordova MD 
[address] [place] [address] [address] [place] [address] VIA Facsimile: [phone] Dear Nieves Cordova MD, Thank you for referring Mr. Veronica Rene to [address] for evaluation. My notes for this consultation are attached. If you have questions, please do not hesitate to call me. I look forward to following your patient along with you. Sincerely, Rodriguez Smith MD

## 2020-09-23 NOTE — PATIENT INSTRUCTIONS
Low Back Arthritis: Exercises Introduction Here are some examples of typical rehabilitation exercises for your condition. Start each exercise slowly. Ease off the exercise if you start to have pain. Your doctor or physical therapist will tell you when you can start these exercises and which ones will work best for you. When you are not being active, find a comfortable position for rest. Some people are comfortable on the floor or a medium-firm bed with a small pillow under their head and another under their knees. Some people prefer to lie on their side with a pillow between their knees. Don't stay in one position for too long. Take short walks (10 to 20 minutes) every 2 to 3 hours. Avoid slopes, hills, and stairs until you feel better. Walk only distances you can manage without pain, especially leg pain. How to do the exercises Pelvic tilt 1. Lie on your back with your knees bent. 2. \"Brace\" your stomachtighten your muscles by pulling in and imagining your belly button moving toward your spine. 3. Press your lower back into the floor. You should feel your hips and pelvis rock back. 4. Hold for 6 seconds while breathing smoothly. 5. Relax and allow your pelvis and hips to rock forward. 6. Repeat 8 to 12 times. Back stretches 1. Get down on your hands and knees on the floor. 2. Relax your head and allow it to droop. Round your back up toward the ceiling until you feel a nice stretch in your upper, middle, and lower back. Hold this stretch for as long as it feels comfortable, or about 15 to 30 seconds. 3. Return to the starting position with a flat back while you are on your hands and knees. 4. Let your back sway by pressing your stomach toward the floor. Lift your buttocks toward the ceiling. 5. Hold this position for 15 to 30 seconds. 6. Repeat 2 to 4 times. Follow-up care is a key part of your treatment and safety.  Be sure to make and go to all appointments, and call your doctor if you are having problems. It's also a good idea to know your test results and keep a list of the medicines you take. Where can you learn more? Go to http://www.gray.com/ Enter U587 in the search box to learn more about \"Low Back Arthritis: Exercises. \" Current as of: March 2, 2020               Content Version: 12.6 © 2006-2020 [employer]. Care instructions adapted under license by [employer] (which disclaims liability or warranty for this information). If you have questions about a medical condition or this instruction, always ask your healthcare professional. [address] any warranty or liability for your use of this information.

## 2020-10-14 ENCOUNTER — TELEPHONE (OUTPATIENT)
Dept: ORTHOPEDIC SURGERY | Age: 75
End: 2020-10-14

## 2020-10-14 DIAGNOSIS — H54.8 LEGALLY BLIND: Primary | ICD-10-CM

## 2020-10-14 DIAGNOSIS — R26.9 GAIT ABNORMALITY: ICD-10-CM

## 2020-10-14 DIAGNOSIS — R29.898 WEAKNESS OF BOTH LEGS: ICD-10-CM

## 2020-10-14 DIAGNOSIS — M47.816 LUMBAR SPONDYLOSIS: ICD-10-CM

## 2020-10-14 DIAGNOSIS — M48.062 SPINAL STENOSIS OF LUMBAR REGION WITH NEUROGENIC CLAUDICATION: ICD-10-CM

## 2020-10-14 DIAGNOSIS — M25.551 RIGHT HIP PAIN: ICD-10-CM

## 2020-10-14 NOTE — TELEPHONE ENCOUNTER
Patient called and is asking to speak with  about a Commote and a Walk in [facility] for his home. Patient is also asking for a refill on Tylenol #3. [employer] on [email or web address]. [phone]. Patient tel. [phone].

## 2020-10-16 ENCOUNTER — DOCUMENTATION ONLY (OUTPATIENT)
Dept: ORTHOPEDIC SURGERY | Age: 75
End: 2020-10-16

## 2020-10-16 NOTE — PROGRESS NOTES
Patient called states he's dropping off V/A forms for in home accommodations for   Installation of a walk-in shower and special commode.

## 2020-10-19 ENCOUNTER — DOCUMENTATION ONLY (OUTPATIENT)
Dept: ORTHOPEDIC SURGERY | Age: 75
End: 2020-10-19

## 2020-10-21 NOTE — TELEPHONE ENCOUNTER
Returned call to patient, verified Name/, per patient he was able to refill his Tylenol #3 at [facility]. Per patient he is requesting a raised toilet seat as well as walk in bath tub with hand rails. VORB placed as per Dr. Ellis Garcia for DMEs. Per patient we can mail the orders to him. No further action required at this time.

## 2020-11-02 ENCOUNTER — TRANSCRIBE ORDER (OUTPATIENT)
Dept: SCHEDULING | Age: 75
End: 2020-11-02

## 2020-11-02 DIAGNOSIS — N28.1 ACQUIRED CYST OF KIDNEY: Primary | ICD-10-CM

## 2020-11-06 ENCOUNTER — TELEPHONE (OUTPATIENT)
Dept: ORTHOPEDIC SURGERY | Age: 75
End: 2020-11-06

## 2020-11-06 NOTE — TELEPHONE ENCOUNTER
Patient called wanting a follow up on his [place] documents he dropped off on 10/16/20 and 10/19/20. Please advise patient at [phone].

## 2020-11-09 ENCOUNTER — HOSPITAL ENCOUNTER (OUTPATIENT)
Dept: ULTRASOUND IMAGING | Age: 75
Discharge: HOME | End: 2020-11-09
Attending: FAMILY MEDICINE
Payer: MEDICARE

## 2020-11-09 DIAGNOSIS — N28.1 ACQUIRED CYST OF KIDNEY: ICD-10-CM

## 2020-11-09 PROCEDURE — 76770 US EXAM ABDO BACK WALL COMP: CPT

## 2020-11-11 ENCOUNTER — TRANSCRIBE ORDER (OUTPATIENT)
Dept: RADIATION THERAPY | Age: 75
End: 2020-11-11

## 2020-11-11 DIAGNOSIS — C61 MALIGNANT NEOPLASM OF PROSTATE (HCC): Primary | ICD-10-CM

## 2020-11-16 ENCOUNTER — HOSPITAL ENCOUNTER (OUTPATIENT)
Dept: LAB | Age: 75
Discharge: HOME | End: 2020-11-16
Payer: MEDICARE

## 2020-11-16 DIAGNOSIS — C61 MALIGNANT NEOPLASM OF PROSTATE (HCC): ICD-10-CM

## 2020-11-16 LAB — PSA SERPL-MCNC: 0.1 NG/ML (ref 0–4)

## 2020-11-16 PROCEDURE — 84403 ASSAY OF TOTAL TESTOSTERONE: CPT

## 2020-11-16 PROCEDURE — 84153 ASSAY OF PSA TOTAL: CPT

## 2020-11-16 PROCEDURE — 36415 COLL VENOUS BLD VENIPUNCTURE: CPT

## 2020-11-17 LAB — TESTOST SERPL-MCNC: 228 NG/DL (ref 264–916)

## 2020-11-25 ENCOUNTER — HOSPITAL ENCOUNTER (OUTPATIENT)
Dept: RADIATION THERAPY | Age: 75
Discharge: HOME | End: 2020-11-25
Payer: MEDICARE

## 2020-11-25 PROCEDURE — 99211 OFF/OP EST MAY X REQ PHY/QHP: CPT

## 2020-12-04 NOTE — TELEPHONE ENCOUNTER
I began to fill out forms for Mr. Sadler. Forms that were dropped off at our office were for patient to complete/submit to [place]. Patient will need a letter from our office explaining medical necessity for patient to obtain a raised toilet seat as well as a walk in bath tub with hand rails. Please review/advise.

## 2021-01-04 ENCOUNTER — OFFICE VISIT (OUTPATIENT)
Dept: ORTHOPEDIC SURGERY | Age: 76
End: 2021-01-04
Payer: MEDICARE

## 2021-01-04 VITALS
DIASTOLIC BLOOD PRESSURE: 77 MMHG | HEIGHT: 70 IN | RESPIRATION RATE: 18 BRPM | OXYGEN SATURATION: 94 % | BODY MASS INDEX: 32.08 KG/M2 | TEMPERATURE: 98.4 F | HEART RATE: 69 BPM | SYSTOLIC BLOOD PRESSURE: 138 MMHG

## 2021-01-04 DIAGNOSIS — M48.062 SPINAL STENOSIS OF LUMBAR REGION WITH NEUROGENIC CLAUDICATION: ICD-10-CM

## 2021-01-04 DIAGNOSIS — K59.03 DRUG INDUCED CONSTIPATION: ICD-10-CM

## 2021-01-04 DIAGNOSIS — H54.8 LEGALLY BLIND: ICD-10-CM

## 2021-01-04 DIAGNOSIS — G89.29 OTHER CHRONIC PAIN: Primary | ICD-10-CM

## 2021-01-04 DIAGNOSIS — M47.816 LUMBAR SPONDYLOSIS: ICD-10-CM

## 2021-01-04 DIAGNOSIS — Z79.891 USE OF OPIATES FOR THERAPEUTIC PURPOSES: ICD-10-CM

## 2021-01-04 DIAGNOSIS — R26.9 GAIT ABNORMALITY: ICD-10-CM

## 2021-01-04 PROCEDURE — G8417 CALC BMI ABV UP PARAM F/U: HCPCS | Performed by: PHYSICAL MEDICINE & REHABILITATION

## 2021-01-04 PROCEDURE — G8427 DOCREV CUR MEDS BY ELIG CLIN: HCPCS | Performed by: PHYSICAL MEDICINE & REHABILITATION

## 2021-01-04 PROCEDURE — 1100F PTFALLS ASSESS-DOCD GE2>/YR: CPT | Performed by: PHYSICAL MEDICINE & REHABILITATION

## 2021-01-04 PROCEDURE — 3288F FALL RISK ASSESSMENT DOCD: CPT | Performed by: PHYSICAL MEDICINE & REHABILITATION

## 2021-01-04 PROCEDURE — 99214 OFFICE O/P EST MOD 30 MIN: CPT | Performed by: PHYSICAL MEDICINE & REHABILITATION

## 2021-01-04 PROCEDURE — G8432 DEP SCR NOT DOC, RNG: HCPCS | Performed by: PHYSICAL MEDICINE & REHABILITATION

## 2021-01-04 PROCEDURE — G8536 NO DOC ELDER MAL SCRN: HCPCS | Performed by: PHYSICAL MEDICINE & REHABILITATION

## 2021-01-04 RX ORDER — ACETAMINOPHEN AND CODEINE PHOSPHATE 300; 30 MG/1; MG/1
1 TABLET ORAL
Qty: 90 TAB | Refills: 2 | Status: SHIPPED | OUTPATIENT
Start: 2021-01-20 | End: 2021-04-05 | Stop reason: SDUPTHER

## 2021-01-04 RX ORDER — ACETAMINOPHEN AND CODEINE PHOSPHATE 300; 30 MG/1; MG/1
1 TABLET ORAL
COMMUNITY
End: 2021-01-04 | Stop reason: SDUPTHER

## 2021-01-04 NOTE — LETTER
1/4/2021 11:38 AM 
 
Mr. Arthur Natarajan [address] [id number] To Whom It May Concern: 
 
Arthur Natarajan is currently under the care of [facility]. He has chronic low back pain and leg weakness and would benefit from a raised toilet seat. I would recommend he obtain a raised toilet seat. If there are questions or concerns please have the patient contact our office. Sincerely, Georgia Jiang MD

## 2021-01-04 NOTE — PATIENT INSTRUCTIONS
Low Back Arthritis: Exercises Introduction Here are some examples of typical rehabilitation exercises for your condition. Start each exercise slowly. Ease off the exercise if you start to have pain. Your doctor or physical therapist will tell you when you can start these exercises and which ones will work best for you. When you are not being active, find a comfortable position for rest. Some people are comfortable on the floor or a medium-firm bed with a small pillow under their head and another under their knees. Some people prefer to lie on their side with a pillow between their knees. Don't stay in one position for too long. Take short walks (10 to 20 minutes) every 2 to 3 hours. Avoid slopes, hills, and stairs until you feel better. Walk only distances you can manage without pain, especially leg pain. How to do the exercises Pelvic tilt 1. Lie on your back with your knees bent. 2. \"Brace\" your stomachtighten your muscles by pulling in and imagining your belly button moving toward your spine. 3. Press your lower back into the floor. You should feel your hips and pelvis rock back. 4. Hold for 6 seconds while breathing smoothly. 5. Relax and allow your pelvis and hips to rock forward. 6. Repeat 8 to 12 times. Back stretches 1. Get down on your hands and knees on the floor. 2. Relax your head and allow it to droop. Round your back up toward the ceiling until you feel a nice stretch in your upper, middle, and lower back. Hold this stretch for as long as it feels comfortable, or about 15 to 30 seconds. 3. Return to the starting position with a flat back while you are on your hands and knees. 4. Let your back sway by pressing your stomach toward the floor. Lift your buttocks toward the ceiling. 5. Hold this position for 15 to 30 seconds. 6. Repeat 2 to 4 times. Follow-up care is a key part of your treatment and safety. Be sure to make and go to all appointments, and call your doctor if you are having problems. It's also a good idea to know your test results and keep a list of the medicines you take. Where can you learn more? Go to http://www.gray.com/ Enter S383 in the search box to learn more about \"Low Back Arthritis: Exercises. \" Current as of: March 2, 2020               Content Version: 12.6 © 2006-2020 [employer]. Care instructions adapted under license by [employer] (which disclaims liability or warranty for this information). If you have questions about a medical condition or this instruction, always ask your healthcare professional. [address] any warranty or liability for your use of this information.

## 2021-01-04 NOTE — LETTER
1/5/2021 Patient: Makayla Krause YOB: 1945 Date of Visit: 1/4/2021 eLxi Clifton MD 
[address] [facility] [address] [facility] [address] Via Fax: [phone] Dear Lexi Cilfton MD, Thank you for referring Mr. Heather Pearl to [address] [place] [address] for evaluation. My notes for this consultation are attached. If you have questions, please do not hesitate to call me. I look forward to following your patient along with you. Sincerely, Veronica Webster MD

## 2021-01-04 NOTE — PROGRESS NOTES
Abhijit Redmond presents today for   Chief Complaint   Patient presents with    Back Pain    Hip Pain     left    Leg Pain     left       Is someone accompanying this pt? no    Is the patient using any DME equipment during OV? Yes, rolling walker    Depression Screening:  3 most recent PHQ Screens 9/23/2020   PHQ Not Done -   Little interest or pleasure in doing things Not at all   Feeling down, depressed, irritable, or hopeless Not at all   Total Score PHQ 2 0       Learning Assessment:  Learning Assessment 4/21/2017   PRIMARY LEARNER Patient   PRIMARY LANGUAGE ENGLISH   LEARNER PREFERENCE PRIMARY LISTENING   ANSWERED BY patient   RELATIONSHIP SELF       Abuse Screening:  Abuse Screening Questionnaire 9/23/2020   Do you ever feel afraid of your partner? N   Are you in a relationship with someone who physically or mentally threatens you? N   Is it safe for you to go home? Y       Fall Risk  Fall Risk Assessment, last 12 mths 9/23/2020   Able to walk? Yes   Fall in past 12 months? Yes   Number of falls in past 12 months 1   Fall with injury? 0       OPIOID RISK TOOL  Opioid Risk Tool 9/23/2020 3/12/2018   Personal history of alcohol abuse? 0 0   Personal history of illegal drug abuse? 0 0   Personal history of prescription drug abuse? 0 0   Age range between 16-45? 0 0   History of preadolescent sexual abuse? 0 0   ADD, OCD, bipolar, schizophrenia? 0 0   Depression? 0 0   Family history of alcohol abuse? 0 0   Family history of illegal drug abuse? 0 0   Family history of prescription drug abuse? 0 0   Opioid Risk Tool Total Score 0 0       Coordination of Care:  1. Have you been to the ER, urgent care clinic since your last visit? no  Hospitalized since your last visit? no    2. Have you seen or consulted any other health care providers outside of the [address] [place] [address] since your last visit? no Include any pap smears or colon screening.  Pt is scheduled for colonoscopy tomorrow    Last  Checked 01/04/2021  Last UDS Checked 09/23/2020  Last Pain Agreement Signed 09/23/2020

## 2021-01-04 NOTE — PROGRESS NOTES
[facility]  Denise Hanna., [address] [place] [address], [facility] [address]  Phone: [phone]  Fax: [phone]    Pt's YOB: 1945    ASSESSMENT   Diagnoses and all orders for this visit:    1. Other chronic pain  -     acetaminophen-codeine (Tylenol-Codeine #3) 300-30 mg per tablet; Take 1 Tab by mouth three (3) times daily as needed for Pain for up to 30 days. Max Daily Amount: 3 Tabs. -     AMB SUPPLY ORDER    2. Use of opiates for therapeutic purposes  -     acetaminophen-codeine (Tylenol-Codeine #3) 300-30 mg per tablet; Take 1 Tab by mouth three (3) times daily as needed for Pain for up to 30 days. Max Daily Amount: 3 Tabs. 3. Lumbar spondylosis  -     AMB SUPPLY ORDER    4. Spinal stenosis of lumbar region with neurogenic claudication  -     AMB SUPPLY ORDER    5. Drug induced constipation    6. Legally blind  -     AMB SUPPLY ORDER    7. Gait abnormality         IMPRESSION AND PLAN:  Loren Higgins is a 66 y.o. male with history of chronic lumbar pain. He denies any change in symptoms since his last office visit. Pt has been prescribed Tylenol #3 and takes 1 tab 3 x daily as his pain warrants with benefit. 1) Pt was given information on lumbar arthritis exercises. 2) He received a refill of Tylenol #3 1 tab TID prn pain that allows him to be more functional; he keeps his medication secure and has assistance from a home health aide. Lakshmi Prasad 3) Pt was given a note states that due to his chronic back pain he would benefit from a raised toilet seat to obtain this through the [facility]. 4) He was prescribed a raised toilet seat to help with the difficulty getting on and off the toilet. [place] 5) Mr. Scott Amado has a reminder for a \"due or due soon\" health maintenance. I have asked that he contact his primary care provider, Malini Stack MD, for follow-up on this health maintenance. 6)  demonstrated consistency with prescribing.    7) Last UDS from 9/23/2020 was consistent. 8)  Pt is also interested in a walk in bathtub -- after reading the paperwork, it is not known whether he qualifies through the [place] as he does not have a service connection rating for this. 9) Pt will continue with the Miralax for the constipation and does not need a refill for this at this time. 10) Pt ambulates with the assistance of a rolling walker. Follow-up and Dispositions    · Return in about 3 months (around 4/4/2021) for Medication follow up. HISTORY OF PRESENT ILLNESS:  Magaly Ruelas is a 66 y.o. male with history of chronic lumbar pain and presents to the office today for follow up. denies any change in symptoms since his last office visit. Pt has been prescribed Tylenol #3 and takes 1 tab 3 x daily as his pain warrants with benefit. Of note, patient's MME/day is 9-13.5. He admits to occasional constipation with the Tylenol #3 but notes relief when taking Miralax. He is currently trying to get a raised toilet seat and a walk-in tub. Pt is followed by Lalit Sanchez MD and is scheduled to follow up with him on 1/27/2021. He is also scheduled for a colonoscopy tomorrow. Pt notes that he has not been as consistent exercising on his stationary bicycle. Pt at this time desires to continue with current care. He notes Dr Maxi Lucas plans to retire this year.     Pain Scale: 7/10    PCP: Lalit Sanchez MD     Past Medical History:   Diagnosis Date    Allergy     Anxiety     ED (erectile dysfunction)     Elevated prostate specific antigen (PSA)     Essential hypertension     Essential tremor     Gout     Hypogonadism in male     Hypotestosteronism     Ill-defined condition     ESSENTIAL TREMORS    Legally blind     Nocturia     Nodular prostate without urinary obstruction     Panic attack     Prostate cancer ([facility] [address] 2/14/13    PNBx-T2a - Prostatic Volume: 26.7 grams - Jeffery 6 (3 + 3) -  Dr. Roosevelt Hoffmann Thyroid disease     small tumor        Social History     Socioeconomic History    Marital status:      Spouse name: Not on file    Number of children: Not on file    Years of education: Not on file    Highest education level: Not on file   Occupational History    Not on file   Social Needs    Financial resource strain: Not on file    Food insecurity     Worry: Not on file     Inability: Not on file    Transportation needs     Medical: Not on file     Non-medical: Not on file   Tobacco Use    Smoking status: Never Smoker    Smokeless tobacco: Never Used   Substance and Sexual Activity    Alcohol use: Yes     Comment: seldom    Drug use: No    Sexual activity: Yes   Lifestyle    Physical activity     Days per week: Not on file     Minutes per session: Not on file    Stress: Not on file   Relationships    Social connections     Talks on phone: Not on file     Gets together: Not on file     Attends Orthodoxy service: Not on file     Active member of club or organization: Not on file     Attends meetings of clubs or organizations: Not on file     Relationship status: Not on file    Intimate partner violence     Fear of current or ex partner: Not on file     Emotionally abused: Not on file     Physically abused: Not on file     Forced sexual activity: Not on file   Other Topics Concern    Not on file   Social History Narrative    Not on file       Current Outpatient Medications   Medication Sig Dispense Refill    [START ON 1/20/2021] acetaminophen-codeine (Tylenol-Codeine #3) 300-30 mg per tablet Take 1 Tab by mouth three (3) times daily as needed for Pain for up to 30 days. Max Daily Amount: 3 Tabs. 90 Tab 2    diclofenac (VOLTAREN) 1 % gel Apply  to affected area four (4) times daily. Apply to right hip 4 times per day as directed 5 Each 3    allopurinol (ZYLOPRIM) 100 mg tablet       polyethylene glycol (MIRALAX) 17 gram/dose powder Take 17 g by mouth daily as needed for Other (constipation).  510 g 5    hydrocortisone (ANUSOL-HC) 2.5 % rectal cream Insert  into rectum two (2) times a day. Use as needed as directed 90 g 1    fluticasone (FLONASE) 50 mcg/actuation nasal spray 2 Sprays by Both Nostrils route daily.  DULoxetine (CYMBALTA) 30 mg capsule Take 30 mg by mouth daily.  NIFEDIPINE (ADALAT CC PO) Take 60 mg by mouth daily.  SPIRONOLACTONE PO Take 25 mg by mouth two (2) times a day.  TOPIRAMATE (TOPAMAX PO) Take 100 mg by mouth two (2) times a day. Indications: TREMORS      diphenhydrAMINE (BENADRYL) 50 mg capsule Take 1 cap PO 7 hrs prior to MRI (3am on 07/24/20) and 1 cap PO 1 hr prior to MRI (9am on 07/24/20) 2 Cap 0    alprostadil (MUSE) 1,000 mcg supp 1,000 mcg by IntraURETHral route as needed (for ED.). 6 Suppository 1    alprostadil (MUSE) 500 mcg supp 500 mcg by Urethral route as needed. 6 Suppository 1    butenafine (MENTAX) 1 % topical cream Mentax 1 % External Cream  APPLY SPARINGLY TO AFFECTED AREA(S) ONCE DAILY   Quantity: 60       Maile Abdiel M.D.;  Started 12-Feb-2009  Active         Allergies   Allergen Reactions    Contrast Agent [Iodine] Hives    Iodinated Contrast Media Itching         REVIEW OF SYSTEMS    Constitutional: Negative for fever, chills, or weight change. Respiratory: Negative for cough or shortness of breath. Cardiovascular: Negative for chest pain or palpitations. Gastrointestinal: Negative for acid reflux, change in bowel habits, or constipation. Genitourinary: Negative for dysuria and flank pain. Musculoskeletal: Positive for lumbar pain and left leg weakness. Psychiatric/Behavioral: Negative for difficulty with sleep. As per HPI    PHYSICAL EXAMINATION  Visit Vitals  /77 (BP 1 Location: Left arm, BP Patient Position: Sitting)   Pulse 69   Temp 98.4 °F (36.9 °C) (Oral)   Resp 18   Ht 5' 10\" (1.778 m)   SpO2 94% Comment: RA   BMI 32.08 kg/m²       Constitutional: Awake, alert, and in no acute distress.   Neurological: 1+ symmetrical DTRs in the lower extremities. Sensation to light touch is intact. Skin: warm, dry, and intact. Musculoskeletal: Tenderness to palpation in the lower lumbar region. Moderate pain with extension and axial loading. Improvement with forward flexion. No pain with internal or external rotation of his hips. Negative straight leg raise bilaterally.     Patient ambulates with the assistance of a rolling walker. Hip Flex  Quads Hamstrings Ankle DF EHL Ankle PF   Right +4/5 +4/5 +4/5 +4/5 +4/5 +4/5   Left +4/5 +4/5  4/5  -4/5  4/5 -4/5     IMAGING:    Lumbar spine MRI from 07/24/2020 were personally reviewed with the patient and demonstrated:          Results from [place] encounter on 07/24/20   MRI LUMB SPINE W WO CONT     Narrative Sagittal and axial multisequence MR images of lumbar spine were obtained without  and with 20 cc Dotarem gadolinium IV contrast     HISTORY: Back pain and left sciatica.     COMPARISON: August 6, 2010     Loss of lumbar lordosis, similar to before. Trace retrolisthesis L5.     No focal compression fracture. Patchy fat signal in the bone marrow with no  pathologic marrow signal abnormalities. No abnormal enhancement. Conus  medullaris ends at L1 with normal morphology and signal intensity.     Multiple large left renal cysts.     L1-L2: Posterior lateral corner disc protrusion. No central stenosis. Mild facet  and ligamentous hypertrophy. Mild foraminal narrowing with no nerve root  compression.     L2-L3: Posterior disc bulge and small left paracentral disc protrusion. No  significant central stenosis.  Facet and ligamentous hypertrophy. Mild foraminal  narrowing with no nerve root compression.     L3-L4: Mild posterior disc bulge. Facet and ligamentous hypertrophy. Posterior  epidural fat present. AP canal measures 9.9 mm  No significant central stenosis.   Mild foraminal narrowing with no nerve root compression.     L4-L5: Mild posterior disc bulge with left lateral disc protrusion/posterior  lateral annular tear. Facet and ligamentous hypertrophy. No significant central  stenosis. Mild to moderate foraminal stenosis but no definite exiting nerve root  compression. Possible mild arachnoiditis     L5-S1: Left laminotomy. Mild posterior disc bulge and endplate spondylosis,  slightly worse on the left. No central stenosis. Right facet and ligamentous  hypertrophy. Moderate right foraminal stenosis with compression of the right  exiting L5 nerve root. Mild-to-moderate left foraminal stenosis, mild  compression of left exiting L5 nerve root also possible.        Impression IMPRESSION: Degenerative disease worst at L4-L5 and L5-S1, with more severe  foraminal stenosis and nerve root compression at L5-S1. Right slightly worse  than left. However, patient is more symptomatic on the left.      Written by Babs Denis, as dictated by Estelle Julien MD.  I, Dr. Estelle Julien confirm that all documentation is accurate.

## 2021-01-14 NOTE — TELEPHONE ENCOUNTER
Patient called and is asking for a refill on the Tylenol #3 medication from [address] [facility]. [phone]. Patient tel [phone].

## 2021-04-05 ENCOUNTER — OFFICE VISIT (OUTPATIENT)
Dept: ORTHOPEDIC SURGERY | Age: 76
End: 2021-04-05
Payer: MEDICARE

## 2021-04-05 VITALS
WEIGHT: 221 LBS | HEIGHT: 70 IN | BODY MASS INDEX: 31.64 KG/M2 | OXYGEN SATURATION: 98 % | HEART RATE: 76 BPM | TEMPERATURE: 97.1 F | SYSTOLIC BLOOD PRESSURE: 137 MMHG | RESPIRATION RATE: 16 BRPM | DIASTOLIC BLOOD PRESSURE: 66 MMHG

## 2021-04-05 DIAGNOSIS — M47.816 LUMBAR SPONDYLOSIS: ICD-10-CM

## 2021-04-05 DIAGNOSIS — Z79.891 USE OF OPIATES FOR THERAPEUTIC PURPOSES: ICD-10-CM

## 2021-04-05 DIAGNOSIS — G89.29 OTHER CHRONIC PAIN: Primary | ICD-10-CM

## 2021-04-05 DIAGNOSIS — G89.29 OTHER CHRONIC PAIN: ICD-10-CM

## 2021-04-05 DIAGNOSIS — R26.9 GAIT ABNORMALITY: ICD-10-CM

## 2021-04-05 DIAGNOSIS — H54.8 LEGALLY BLIND: ICD-10-CM

## 2021-04-05 DIAGNOSIS — R29.898 WEAKNESS OF BOTH LEGS: ICD-10-CM

## 2021-04-05 DIAGNOSIS — K59.03 DRUG INDUCED CONSTIPATION: ICD-10-CM

## 2021-04-05 PROCEDURE — G8536 NO DOC ELDER MAL SCRN: HCPCS | Performed by: PHYSICAL MEDICINE & REHABILITATION

## 2021-04-05 PROCEDURE — G8510 SCR DEP NEG, NO PLAN REQD: HCPCS | Performed by: PHYSICAL MEDICINE & REHABILITATION

## 2021-04-05 PROCEDURE — G8417 CALC BMI ABV UP PARAM F/U: HCPCS | Performed by: PHYSICAL MEDICINE & REHABILITATION

## 2021-04-05 PROCEDURE — G8427 DOCREV CUR MEDS BY ELIG CLIN: HCPCS | Performed by: PHYSICAL MEDICINE & REHABILITATION

## 2021-04-05 PROCEDURE — 3288F FALL RISK ASSESSMENT DOCD: CPT | Performed by: PHYSICAL MEDICINE & REHABILITATION

## 2021-04-05 PROCEDURE — 1100F PTFALLS ASSESS-DOCD GE2>/YR: CPT | Performed by: PHYSICAL MEDICINE & REHABILITATION

## 2021-04-05 PROCEDURE — 99214 OFFICE O/P EST MOD 30 MIN: CPT | Performed by: PHYSICAL MEDICINE & REHABILITATION

## 2021-04-05 RX ORDER — ACETAMINOPHEN AND CODEINE PHOSPHATE 300; 30 MG/1; MG/1
1 TABLET ORAL
Qty: 90 TAB | Refills: 2 | Status: SHIPPED | OUTPATIENT
Start: 2021-04-16 | End: 2021-07-12 | Stop reason: SDUPTHER

## 2021-04-05 RX ORDER — POLYETHYLENE GLYCOL 3350 17 G/17G
17 POWDER, FOR SOLUTION ORAL
Qty: 510 G | Refills: 5 | Status: SHIPPED | OUTPATIENT
Start: 2021-04-05 | End: 2021-10-11 | Stop reason: SDUPTHER

## 2021-04-05 NOTE — LETTER
4/7/2021 Patient: Tarah Staton YOB: 1945 Date of Visit: 4/5/2021 Milton Hinojosa MD 
[address] [facility] [address] [address] Fax: [phone] Dear Milton Hinojosa MD, Thank you for referring Mr. Polina Smith to [facility] for evaluation. My notes for this consultation are attached. If you have questions, please do not hesitate to call me. I look forward to following your patient along with you. Sincerely, Kenney Bond MD

## 2021-04-05 NOTE — PATIENT INSTRUCTIONS
Low Back Arthritis: Exercises Introduction Here are some examples of typical rehabilitation exercises for your condition. Start each exercise slowly. Ease off the exercise if you start to have pain. Your doctor or physical therapist will tell you when you can start these exercises and which ones will work best for you. When you are not being active, find a comfortable position for rest. Some people are comfortable on the floor or a medium-firm bed with a small pillow under their head and another under their knees. Some people prefer to lie on their side with a pillow between their knees. Don't stay in one position for too long. Take short walks (10 to 20 minutes) every 2 to 3 hours. Avoid slopes, hills, and stairs until you feel better. Walk only distances you can manage without pain, especially leg pain. How to do the exercises Pelvic tilt 1. Lie on your back with your knees bent. 2. \"Brace\" your stomachtighten your muscles by pulling in and imagining your belly button moving toward your spine. 3. Press your lower back into the floor. You should feel your hips and pelvis rock back. 4. Hold for 6 seconds while breathing smoothly. 5. Relax and allow your pelvis and hips to rock forward. 6. Repeat 8 to 12 times. Back stretches 1. Get down on your hands and knees on the floor. 2. Relax your head and allow it to droop. Round your back up toward the ceiling until you feel a nice stretch in your upper, middle, and lower back. Hold this stretch for as long as it feels comfortable, or about 15 to 30 seconds. 3. Return to the starting position with a flat back while you are on your hands and knees. 4. Let your back sway by pressing your stomach toward the floor. Lift your buttocks toward the ceiling. 5. Hold this position for 15 to 30 seconds. 6. Repeat 2 to 4 times. Follow-up care is a key part of your treatment and safety.  Be sure to make and go to all appointments, and call your doctor if you are having problems. It's also a good idea to know your test results and keep a list of the medicines you take. Where can you learn more? Go to http://www.gray.com/ Enter U391 in the search box to learn more about \"Low Back Arthritis: Exercises. \" Current as of: November 16, 2020               Content Version: 12.8 © 2006-2021 [employer]. Care instructions adapted under license by [employer] (which disclaims liability or warranty for this information). If you have questions about a medical condition or this instruction, always ask your healthcare professional. [place] [address] any warranty or liability for your use of this information.

## 2021-04-05 NOTE — PROGRESS NOTES
[facility]  Denise Hanna., [address] [place] [address], [facility] [address]  Phone: [phone]  Fax: [phone]    Pt's YOB: 1945    ASSESSMENT   Diagnoses and all orders for this visit:    1. Other chronic pain  -     acetaminophen-codeine (Tylenol-Codeine #3) 300-30 mg per tablet; Take 1 Tab by mouth three (3) times daily as needed for Pain for up to 30 days. Max Daily Amount: 3 Tabs. -     DRUG SCREEN UR - W/ CONFIRM; Future    2. Use of opiates for therapeutic purposes  -     acetaminophen-codeine (Tylenol-Codeine #3) 300-30 mg per tablet; Take 1 Tab by mouth three (3) times daily as needed for Pain for up to 30 days. Max Daily Amount: 3 Tabs. -     DRUG SCREEN UR - W/ CONFIRM; Future    3. Lumbar spondylosis  -     AMB SUPPLY ORDER    4. Drug induced constipation  -     polyethylene glycol (Miralax) 17 gram/dose powder; Take 17 g by mouth daily as needed for Constipation. 5. Gait abnormality  -     AMB SUPPLY ORDER    6. Weakness of both legs  -     AMB SUPPLY ORDER    7. Legally blind         IMPRESSION AND PLAN:  Cezar Richardson is a 66 y.o. male with history of chronic lumbar pain. He denies any change in symptoms since his last office visit. Pt reports continued pain in the lower back with weakness and pain radiating down the left leg. He takes Tylenol #3 1 tab 3 x daily as his pain warrants with benefit. Pt also uses Voltaren 1% gel as needed. 1) Pt was given information on lumbar arthritis exercises. 2) He received a refill of Tylenol #3 1 tab TID prn pain that allows him to be more functional.  3) Pt received a refill of Miralax. 4) He was prescribed a raised toilet seat to help with the difficulty getting on and off the toilet. 5) Pt will continue using Voltaren 1% gel as needed and does not need a refill at this time. 6) Mr. Rafael Wooten has a reminder for a \"due or due soon\" health maintenance.  I have asked that he contact his primary care provider, Michelle Zuñiga MD, for follow-up on this health maintenance. 7)  demonstrated consistency with prescribing. 8) Last UDS from 9/23/2020 was consistent. 9) Recommend PT for strengthening, balance and gait -- may consider home therapy if able- pt to consider  Follow-up and Dispositions    · Return in about 3 months (around 7/5/2021) for Medication follow up. HISTORY OF PRESENT ILLNESS:  Aundrea Alcocer is a 66 y.o. male with history of chronic lumbar pain and presents to the office today for follow up with his niece. He denies any change in symptoms since his last office visit. Pt reports continued pain in the lower back with weakness and pain radiating down the left leg. He notes that he fell sometime in 1/2021 when he lost his balance with a shocking lower back pain and was unable to get off the floor by himself. Pt reports that his granddaughter and nurse where at the house with him at the time, so he was on the floor for about 5 minutes. He notes that he generally feels steady on his feet and denies any other falls since his last office visit. Pt denies any loss of bowel/bladder control. He followed up with his PCP, Michelle Zuñiga MD, after the fall. Pt has been prescribed Tylenol #3 and takes 1 tab 3 x daily as his pain warrants with benefit. He admits to occasional constipation with the Tylenol #3 but notes relief when taking Miralax. Pt uses Voltaren 1% gel as needed. He notes that he did not receive the raised toilet seat ordered at his last office visit. Pt notes that he received both doses of the Moderna COVID-19 vaccination since his last office visit. Pt at this time desires to continue with current care.     Pain Scale: 8/10    PCP: Michelle Zuñiga MD     Past Medical History:   Diagnosis Date    Allergy     Anxiety     ED (erectile dysfunction)     Elevated prostate specific antigen (PSA)     Essential hypertension     Essential tremor     Gout     Hypogonadism in male     Hypotestosteronism     Ill-defined condition     ESSENTIAL TREMORS    Legally blind     Nocturia     Nodular prostate without urinary obstruction     Panic attack     Prostate cancer ([facility] [address] 2/14/13    PNBx-T2a - Prostatic Volume: 26.7 grams - Ransom 6 (3 + 3) -  Dr. Hsieh  Thyroid disease     small tumor        Social History     Socioeconomic History    Marital status:      Spouse name: Not on file    Number of children: Not on file    Years of education: Not on file    Highest education level: Not on file   Occupational History    Not on file   Social Needs    Financial resource strain: Not on file    Food insecurity     Worry: Not on file     Inability: Not on file    Transportation needs     Medical: Not on file     Non-medical: Not on file   Tobacco Use    Smoking status: Never Smoker    Smokeless tobacco: Never Used   Substance and Sexual Activity    Alcohol use: Yes     Comment: seldom    Drug use: No    Sexual activity: Yes   Lifestyle    Physical activity     Days per week: Not on file     Minutes per session: Not on file    Stress: Not on file   Relationships    Social connections     Talks on phone: Not on file     Gets together: Not on file     Attends Baptism service: Not on file     Active member of club or organization: Not on file     Attends meetings of clubs or organizations: Not on file     Relationship status: Not on file    Intimate partner violence     Fear of current or ex partner: Not on file     Emotionally abused: Not on file     Physically abused: Not on file     Forced sexual activity: Not on file   Other Topics Concern    Not on file   Social History Narrative    Not on file       Current Outpatient Medications   Medication Sig Dispense Refill    [START ON 4/16/2021] acetaminophen-codeine (Tylenol-Codeine #3) 300-30 mg per tablet Take 1 Tab by mouth three (3) times daily as needed for Pain for up to 30 days.  Max Daily Amount: 3 Tabs. 90 Tab 2    polyethylene glycol (Miralax) 17 gram/dose powder Take 17 g by mouth daily as needed for Constipation. 510 g 5    diclofenac (VOLTAREN) 1 % gel Apply  to affected area four (4) times daily. Apply to right hip 4 times per day as directed 5 Each 3    diphenhydrAMINE (BENADRYL) 50 mg capsule Take 1 cap PO 7 hrs prior to MRI (3am on 07/24/20) and 1 cap PO 1 hr prior to MRI (9am on 07/24/20) 2 Cap 0    allopurinol (ZYLOPRIM) 100 mg tablet       alprostadil (MUSE) 1,000 mcg supp 1,000 mcg by IntraURETHral route as needed (for ED.). 6 Suppository 1    alprostadil (MUSE) 500 mcg supp 500 mcg by Urethral route as needed. 6 Suppository 1    hydrocortisone (ANUSOL-HC) 2.5 % rectal cream Insert  into rectum two (2) times a day. Use as needed as directed 90 g 1    butenafine (MENTAX) 1 % topical cream Mentax 1 % External Cream  APPLY SPARINGLY TO AFFECTED AREA(S) ONCE DAILY   Quantity: 60       Nancy Velez M.D.;  Started 12-Feb-2009  Active      fluticasone (FLONASE) 50 mcg/actuation nasal spray 2 Sprays by Both Nostrils route daily.  DULoxetine (CYMBALTA) 30 mg capsule Take 30 mg by mouth daily.  NIFEdipine ER (Adalat CC) 60 mg ER tablet Take 60 mg by mouth daily.  spironolactone (ALDACTONE) 25 mg tablet Take 25 mg by mouth two (2) times a day.  topiramate (Topamax) 100 mg tablet Take 100 mg by mouth two (2) times a day. Indications: muscle tremors         Allergies   Allergen Reactions    Contrast Agent [Iodine] Hives    Iodinated Contrast Media Itching         REVIEW OF SYSTEMS    Constitutional: Negative for fever, chills, or weight change. Respiratory: Negative for cough or shortness of breath. Cardiovascular: Negative for chest pain or palpitations. Gastrointestinal: Negative for acid reflux, change in bowel habits, or constipation. Genitourinary: Negative for dysuria and flank pain.    Musculoskeletal: Positive for lumbar pain and left leg weakness  Neurological: Negative for headaches, dizziness, or numbness. Endo/Heme/Allergies: Negative for increased bruising. Psychiatric/Behavioral: Negative for difficulty with sleep. As per HPI    PHYSICAL EXAMINATION  Visit Vitals  /66   Pulse 76   Temp 97.1 °F (36.2 °C) (Temporal)   Resp 16   Ht 5' 10\" (1.778 m)   Wt 221 lb (100.2 kg)   SpO2 98%   BMI 31.71 kg/m²       Constitutional: Awake, alert, and in no acute distress. Neurological: 1+ symmetrical DTRs in the upper extremities. 1+ symmetrical DTRs in the lower extremities. Sensation to light touch is intact. Negative Malloy's sign bilaterally. Skin: warm, dry, and intact. Musculoskeletal: Tenderness to palpation in the lower lumbar region. Moderate pain with extension and axial loading. Improvement with forward flexion. No pain with internal or external rotation of his hips. Negative straight leg raise bilaterally.     Patient ambulates with the assistance of a rolling walker. Hip Flex  Quads Hamstrings Ankle DF EHL Ankle PF   Right +4/5 +4/5 +4/5 +4/5 +4/5 +4/5   Left +4/5 +4/5  4/5 -4/5 +4/5 -4/5     IMAGING:    Lumbar spine MRI from 07/24/2020 were personally reviewed with the patient and demonstrated:          Results from [place] encounter on 07/24/20   MRI LUMB SPINE W WO CONT     Narrative Sagittal and axial multisequence MR images of lumbar spine were obtained without  and with 20 cc Dotarem gadolinium IV contrast     HISTORY: Back pain and left sciatica.     COMPARISON: August 6, 2010     Loss of lumbar lordosis, similar to before. Trace retrolisthesis L5.     No focal compression fracture. Patchy fat signal in the bone marrow with no  pathologic marrow signal abnormalities. No abnormal enhancement. Conus  medullaris ends at L1 with normal morphology and signal intensity.     Multiple large left renal cysts.     L1-L2: Posterior lateral corner disc protrusion. No central stenosis.  Mild facet  and ligamentous hypertrophy. Mild foraminal narrowing with no nerve root  compression.     L2-L3: Posterior disc bulge and small left paracentral disc protrusion. No  significant central stenosis.  Facet and ligamentous hypertrophy. Mild foraminal  narrowing with no nerve root compression.     L3-L4: Mild posterior disc bulge. Facet and ligamentous hypertrophy. Posterior  epidural fat present. AP canal measures 9.9 mm  No significant central stenosis. Mild foraminal narrowing with no nerve root compression.     L4-L5: Mild posterior disc bulge with left lateral disc protrusion/posterior  lateral annular tear. Facet and ligamentous hypertrophy. No significant central  stenosis. Mild to moderate foraminal stenosis but no definite exiting nerve root  compression. Possible mild arachnoiditis     L5-S1: Left laminotomy. Mild posterior disc bulge and endplate spondylosis,  slightly worse on the left. No central stenosis. Right facet and ligamentous  hypertrophy. Moderate right foraminal stenosis with compression of the right  exiting L5 nerve root. Mild-to-moderate left foraminal stenosis, mild  compression of left exiting L5 nerve root also possible.        Impression IMPRESSION: Degenerative disease worst at L4-L5 and L5-S1, with more severe  foraminal stenosis and nerve root compression at L5-S1. Right slightly worse  than left. However, patient is more symptomatic on the left. Written by [facility], as dictated by Bulmaro Tesfaye MD.  I, Dr. Bulmaro Tesfaye confirm that all documentation is accurate.

## 2021-04-05 NOTE — PROGRESS NOTES
Ahbi Carney presents today for   Chief Complaint   Patient presents with    LOW BACK PAIN    Buttocks pain     left    Leg Pain     left    Follow-up       Is someone accompanying this pt? Yes; percy Jalloh    Is the patient using any DME equipment during [address]? Yes, Veronique Barry with a seat    Depression Screening:  3 most recent PHQ Screens 4/5/2021   PHQ Not Done -   Little interest or pleasure in doing things Not at all   Feeling down, depressed, irritable, or hopeless Not at all   Total Score PHQ 2 0       Learning Assessment:  Learning Assessment 4/21/2017   PRIMARY LEARNER Patient   PRIMARY LANGUAGE ENGLISH   LEARNER PREFERENCE PRIMARY LISTENING   ANSWERED BY patient   RELATIONSHIP SELF       Abuse Screening:  Abuse Screening Questionnaire 9/23/2020   Do you ever feel afraid of your partner? N   Are you in a relationship with someone who physically or mentally threatens you? N   Is it safe for you to go home? Y       Fall Risk  Fall Risk Assessment, last 12 mths 4/5/2021   Able to walk? Yes   Fall in past 12 months? 0   Do you feel unsteady? 0   Are you worried about falling 0   Number of falls in past 12 months -   Fall with injury? -       OPIOID RISK TOOL  Opioid Risk Tool 9/23/2020 3/12/2018   Personal history of alcohol abuse? 0 0   Personal history of illegal drug abuse? 0 0   Personal history of prescription drug abuse? 0 0   Age range between 16-45? 0 0   History of preadolescent sexual abuse? 0 0   ADD, OCD, bipolar, schizophrenia? 0 0   Depression? 0 0   Family history of alcohol abuse? 0 0   Family history of illegal drug abuse? 0 0   Family history of prescription drug abuse? 0 0   Opioid Risk Tool Total Score 0 0       Pt currently taking Antiplatelet therapy? No    Coordination of Care:  1. Have you been to the ER, urgent care clinic since your last visit? No  Hospitalized since your last visit? No    2.  Have you seen or consulted any other health care providers outside of the [facility] since your last visit? No Include any pap smears or colon screening.  No    Last  Checked 04/05/2021  Last UDS Checked 09/23/2020  Last Pain Agreement Signed 09/23/2020

## 2021-04-07 ENCOUNTER — HOSPITAL ENCOUNTER (OUTPATIENT)
Dept: LAB | Age: 76
Discharge: HOME | End: 2021-04-07
Payer: MEDICARE

## 2021-04-07 PROCEDURE — U0003 INFECTIOUS AGENT DETECTION BY NUCLEIC ACID (DNA OR RNA); SEVERE ACUTE RESPIRATORY SYNDROME CORONAVIRUS 2 (SARS-COV-2) (CORONAVIRUS DISEASE [COVID-19]), AMPLIFIED PROBE TECHNIQUE, MAKING USE OF HIGH THROUGHPUT TECHNOLOGIES AS DESCRIBED BY CMS-2020-01-R: HCPCS

## 2021-04-09 ENCOUNTER — ANESTHESIA EVENT (OUTPATIENT)
Dept: ENDOSCOPY | Age: 76
End: 2021-04-09
Payer: MEDICARE

## 2021-04-09 LAB — SARS-COV-2, COV2NT: NOT DETECTED

## 2021-04-12 ENCOUNTER — ANESTHESIA (OUTPATIENT)
Dept: ENDOSCOPY | Age: 76
End: 2021-04-12
Payer: MEDICARE

## 2021-04-12 ENCOUNTER — HOSPITAL ENCOUNTER (OUTPATIENT)
Age: 76
Setting detail: OUTPATIENT SURGERY
Discharge: HOME | End: 2021-04-12
Attending: INTERNAL MEDICINE | Admitting: INTERNAL MEDICINE
Payer: MEDICARE

## 2021-04-12 VITALS
SYSTOLIC BLOOD PRESSURE: 120 MMHG | TEMPERATURE: 98 F | HEIGHT: 70 IN | DIASTOLIC BLOOD PRESSURE: 74 MMHG | RESPIRATION RATE: 16 BRPM | OXYGEN SATURATION: 99 % | BODY MASS INDEX: 31.07 KG/M2 | WEIGHT: 217 LBS | HEART RATE: 65 BPM

## 2021-04-12 PROCEDURE — 88305 TISSUE EXAM BY PATHOLOGIST: CPT

## 2021-04-12 PROCEDURE — 76060000032 HC ANESTHESIA 0.5 TO 1 HR: Performed by: INTERNAL MEDICINE

## 2021-04-12 PROCEDURE — 74011250636 HC RX REV CODE- 250/636: Performed by: NURSE ANESTHETIST, CERTIFIED REGISTERED

## 2021-04-12 PROCEDURE — 00811 ANES LWR INTST NDSC NOS: CPT | Performed by: ANESTHESIOLOGY

## 2021-04-12 PROCEDURE — 77030021593 HC FCPS BIOP ENDOSC BSC -A: Performed by: INTERNAL MEDICINE

## 2021-04-12 PROCEDURE — 00811 ANES LWR INTST NDSC NOS: CPT | Performed by: NURSE ANESTHETIST, CERTIFIED REGISTERED

## 2021-04-12 PROCEDURE — 77030013992 HC SNR POLYP ENDOSC BSC -B: Performed by: INTERNAL MEDICINE

## 2021-04-12 PROCEDURE — 76040000007: Performed by: INTERNAL MEDICINE

## 2021-04-12 PROCEDURE — 77030008565 HC TBNG SUC IRR ERBE -B: Performed by: INTERNAL MEDICINE

## 2021-04-12 PROCEDURE — 99100 ANES PT EXTEME AGE<1 YR&>70: CPT | Performed by: ANESTHESIOLOGY

## 2021-04-12 PROCEDURE — 77030029384 HC SNR POLYP CAPTVR II BSC -B: Performed by: INTERNAL MEDICINE

## 2021-04-12 PROCEDURE — 74011250637 HC RX REV CODE- 250/637: Performed by: NURSE ANESTHETIST, CERTIFIED REGISTERED

## 2021-04-12 PROCEDURE — 2709999900 HC NON-CHARGEABLE SUPPLY: Performed by: INTERNAL MEDICINE

## 2021-04-12 PROCEDURE — 99100 ANES PT EXTEME AGE<1 YR&>70: CPT | Performed by: NURSE ANESTHETIST, CERTIFIED REGISTERED

## 2021-04-12 PROCEDURE — 74011000250 HC RX REV CODE- 250: Performed by: NURSE ANESTHETIST, CERTIFIED REGISTERED

## 2021-04-12 PROCEDURE — 77030040934 HC CATH DIAG DXTERITY MEDT -A: Performed by: INTERNAL MEDICINE

## 2021-04-12 RX ORDER — PROPOFOL 10 MG/ML
INJECTION, EMULSION INTRAVENOUS AS NEEDED
Status: DISCONTINUED | OUTPATIENT
Start: 2021-04-12 | End: 2021-04-12 | Stop reason: HOSPADM

## 2021-04-12 RX ORDER — LIDOCAINE HYDROCHLORIDE 20 MG/ML
INJECTION, SOLUTION EPIDURAL; INFILTRATION; INTRACAUDAL; PERINEURAL AS NEEDED
Status: DISCONTINUED | OUTPATIENT
Start: 2021-04-12 | End: 2021-04-12 | Stop reason: HOSPADM

## 2021-04-12 RX ORDER — LIDOCAINE HYDROCHLORIDE 10 MG/ML
0.1 INJECTION, SOLUTION EPIDURAL; INFILTRATION; INTRACAUDAL; PERINEURAL AS NEEDED
Status: DISCONTINUED | OUTPATIENT
Start: 2021-04-12 | End: 2021-04-12 | Stop reason: HOSPADM

## 2021-04-12 RX ORDER — FAMOTIDINE 20 MG/1
20 TABLET, FILM COATED ORAL ONCE
Status: COMPLETED | OUTPATIENT
Start: 2021-04-12 | End: 2021-04-12

## 2021-04-12 RX ORDER — ONDANSETRON 2 MG/ML
4 INJECTION INTRAMUSCULAR; INTRAVENOUS ONCE
Status: CANCELLED | OUTPATIENT
Start: 2021-04-12 | End: 2021-04-12

## 2021-04-12 RX ORDER — SODIUM CHLORIDE, SODIUM LACTATE, POTASSIUM CHLORIDE, CALCIUM CHLORIDE 600; 310; 30; 20 MG/100ML; MG/100ML; MG/100ML; MG/100ML
25 INJECTION, SOLUTION INTRAVENOUS CONTINUOUS
Status: DISCONTINUED | OUTPATIENT
Start: 2021-04-12 | End: 2021-04-12 | Stop reason: HOSPADM

## 2021-04-12 RX ORDER — PHENYLEPHRINE HCL IN 0.9% NACL 1 MG/10 ML
SYRINGE (ML) INTRAVENOUS AS NEEDED
Status: DISCONTINUED | OUTPATIENT
Start: 2021-04-12 | End: 2021-04-12 | Stop reason: HOSPADM

## 2021-04-12 RX ORDER — SODIUM CHLORIDE, SODIUM LACTATE, POTASSIUM CHLORIDE, CALCIUM CHLORIDE 600; 310; 30; 20 MG/100ML; MG/100ML; MG/100ML; MG/100ML
25 INJECTION, SOLUTION INTRAVENOUS CONTINUOUS
Status: CANCELLED | OUTPATIENT
Start: 2021-04-12

## 2021-04-12 RX ADMIN — SODIUM CHLORIDE, SODIUM LACTATE, POTASSIUM CHLORIDE, AND CALCIUM CHLORIDE 25 ML/HR: 600; 310; 30; 20 INJECTION, SOLUTION INTRAVENOUS at 10:23

## 2021-04-12 RX ADMIN — PROPOFOL 50 MG: 10 INJECTION, EMULSION INTRAVENOUS at 10:33

## 2021-04-12 RX ADMIN — PROPOFOL 50 MG: 10 INJECTION, EMULSION INTRAVENOUS at 11:05

## 2021-04-12 RX ADMIN — PROPOFOL 50 MG: 10 INJECTION, EMULSION INTRAVENOUS at 10:35

## 2021-04-12 RX ADMIN — PROPOFOL 20 MG: 10 INJECTION, EMULSION INTRAVENOUS at 10:47

## 2021-04-12 RX ADMIN — PROPOFOL 50 MG: 10 INJECTION, EMULSION INTRAVENOUS at 10:49

## 2021-04-12 RX ADMIN — PROPOFOL 30 MG: 10 INJECTION, EMULSION INTRAVENOUS at 10:38

## 2021-04-12 RX ADMIN — PROPOFOL 50 MG: 10 INJECTION, EMULSION INTRAVENOUS at 10:32

## 2021-04-12 RX ADMIN — PROPOFOL 50 MG: 10 INJECTION, EMULSION INTRAVENOUS at 11:13

## 2021-04-12 RX ADMIN — Medication 100 MCG: at 10:45

## 2021-04-12 RX ADMIN — Medication 100 MCG: at 11:01

## 2021-04-12 RX ADMIN — Medication 100 MCG: at 10:44

## 2021-04-12 RX ADMIN — PROPOFOL 50 MG: 10 INJECTION, EMULSION INTRAVENOUS at 10:34

## 2021-04-12 RX ADMIN — FAMOTIDINE 20 MG: 20 TABLET ORAL at 10:15

## 2021-04-12 RX ADMIN — LIDOCAINE HYDROCHLORIDE 50 MG: 20 INJECTION, SOLUTION EPIDURAL; INFILTRATION; INTRACAUDAL; PERINEURAL at 10:32

## 2021-04-12 RX ADMIN — PROPOFOL 50 MG: 10 INJECTION, EMULSION INTRAVENOUS at 10:55

## 2021-04-12 RX ADMIN — PROPOFOL 50 MG: 10 INJECTION, EMULSION INTRAVENOUS at 10:53

## 2021-04-12 NOTE — PROGRESS NOTES
[email or web address]  [place], [address]      Brief Procedure Note    Piter Schaeffer  1945  [id number]    Date of Procedure: 4/12/2021    Preoperative diagnosis: Personal history of colonic polyps [Z86.010]    Postoperative diagnosis: 2 Sigmoid polyps ,Transverse polyps x 4 ,  ascending polyps x5 .  diverticulosis    Type of Anesthesia: MAC (Monitored anesthesia care)    Description of findings: same as post op dx    Procedure: Procedure(s):  COLONOSCOPY with polypectomies    :  Dr. Marlen Rodriguez MD    Assistant(s): Endoscopy Technician-1: Vargas Smith  Endoscopy RN-1: Brian Sethi RN  Endoscopy RN-2: Miki Zaidi RN    Devices/implants/grafts/tissues/prosthesis: None    EBL:None    Specimens:   ID Type Source Tests Collected by Time Destination   1 : sigmoid polyp #1 Preservative Sigmoid  Angel Montoya MD 4/12/2021 1035 Pathology   2 :  sigmoid polyps #2 Preservative Sigmoid  Angel Montoya MD 4/12/2021 1045 Pathology   3 : transverse polyps Preservative Colon, Diya Baton  Angel Montoya MD 4/12/2021 1046 Pathology   4 : ascending polyps Preservative Colon, Ascending  Angel Montoya MD 4/12/2021 1058 Pathology       Findings: See printed and scanned procedure note    Complications: None    Dr. Marlen Rodriguez MD  4/12/2021  11:24 AM

## 2021-04-12 NOTE — DISCHARGE INSTRUCTIONS
Patient Education        Colon Polyps: Care Instructions  Your Care Instructions     Colon polyps are growths in the colon or the rectum. The cause of most colon polyps is not known, and most people who get them do not have any problems. But a certain kind can turn into cancer. For this reason, regular testing for colon polyps is important for people as they get older. It is also important for anyone who has an increased risk for colon cancer. Polyps are usually found through routine colon cancer screening tests. Although most colon polyps are not cancerous, they are usually removed and then tested for cancer. Screening for colon cancer saves lives because the cancer can usually be cured if it is caught early. If you have a polyp that is the type that can turn into cancer, you may need more tests to examine your entire colon. The doctor will remove any other polyps that he or she finds, and you will be tested more often. Follow-up care is a key part of your treatment and safety. Be sure to make and go to all appointments, and call your doctor if you are having problems. It's also a good idea to know your test results and keep a list of the medicines you take. How can you care for yourself at home? Regular exams to look for colon polyps are the best way to prevent polyps from turning into colon cancer. These can include stool tests, sigmoidoscopy, colonoscopy, and CT colonography. Talk with your doctor about a testing schedule that is right for you. To prevent polyps  There is no home treatment that can prevent colon polyps. But these steps may help lower your risk for cancer. · Stay active. Being active can help you get to and stay at a healthy weight. Try to exercise on most days of the week. Walking is a good choice. · Eat well. Choose a variety of vegetables, fruits, legumes (such as peas and beans), fish, poultry, and whole grains. · Do not smoke.  If you need help quitting, talk to your doctor about stop-smoking programs and medicines. These can increase your chances of quitting for good. · If you drink alcohol, limit how much you drink. Limit alcohol to 2 drinks a day for men and 1 drink a day for women. When should you call for help? Call your doctor now or seek immediate medical care if:    · You have severe belly pain.     · Your stools are maroon or very bloody. Watch closely for changes in your health, and be sure to contact your doctor if:    · You have a fever.     · You have nausea or vomiting.     · You have a change in bowel habits (new constipation or diarrhea).     · Your symptoms get worse or are not improving as expected. Where can you learn more? Go to http://www.vang.com/  Enter C571 in the search box to learn more about \"Colon Polyps: Care Instructions. \"  Current as of: December 17, 2020               Content Version: 12.8  © 7186-8936 [employer]. Care instructions adapted under license by [employer] (which disclaims liability or warranty for this information). If you have questions about a medical condition or this instruction, always ask your healthcare professional. [place] [address] any warranty or liability for your use of this information. Patient Education        Learning About Diverticulosis and Diverticulitis  What are diverticulosis and diverticulitis? In diverticulosis and diverticulitis, pouches called diverticula form in the wall of the large intestine, or colon. · In diverticulosis, the pouches do not cause any pain or other symptoms. · In diverticulitis, the pouches get inflamed or infected and cause symptoms. Doctors aren't sure what causes these pouches in the colon. But they think that a low-fiber diet may play a role. Without fiber to add bulk to the stool, the colon has to work harder than normal to push the stool forward.  The pressure from this may cause pouches to form in weak spots along the colon. Some people with diverticulosis get diverticulitis. But experts don't know why this happens. What are the symptoms? · In diverticulosis, most people don't have symptoms. But pouches sometimes bleed. · In diverticulitis, symptoms may last from a few hours to a week or more. They include:  ? Belly pain. This is usually in the lower left side. It is sometimes worse when you move. This is the most common symptom. ? Fever and chills. ? Bloating and gas. ? Diarrhea or constipation. ? Nausea and sometimes vomiting.  ? Not feeling like eating. How can you prevent diverticulitis? You may be able to lower your chance of getting diverticulitis. You can do this by taking steps to prevent constipation. · Eat fruits, vegetables, beans, and whole grains every day. These foods are high in fiber. · Drink plenty of fluids. If you have kidney, heart, or liver disease and have to limit fluids, talk with your doctor before you increase the amount of fluids you drink. · Get at least 30 minutes of exercise on most days of the week. Walking is a good choice. You also may want to do other activities, such as running, swimming, cycling, or playing tennis or team sports. · Take a fiber supplement, such as Citrucel or Metamucil, every day if needed. Read and follow all instructions on the label. · Schedule time each day for a bowel movement. Having a daily routine may help. Take your time and do not strain when having a bowel movement. Some people avoid nuts, seeds, berries, and popcorn. They believe that these foods might get trapped in the diverticula and cause pain. But there is no proof that these foods cause diverticulitis or make it worse. How are these problems treated? · The best way to treat diverticulosis is to avoid constipation. · Treatment for diverticulitis includes antibiotics. It often includes a change in your diet.  You may need only liquids at first. Your doctor may suggest pain medicines for pain or belly cramps. In some cases, surgery may be needed. Follow-up care is a key part of your treatment and safety. Be sure to make and go to all appointments, and call your doctor if you are having problems. It's also a good idea to know your test results and keep a list of the medicines you take. Where can you learn more? Go to http://www.gray.com/  Enter W069 in the search box to learn more about \"Learning About Diverticulosis and Diverticulitis. \"  Current as of: April 15, 2020               Content Version: 12.8  © 2532-6958 [employer]. Care instructions adapted under license by [employer] (which disclaims liability or warranty for this information). If you have questions about a medical condition or this instruction, always ask your healthcare professional. [address] any warranty or liability for your use of this information. Patient Education        High-Fiber Diet: Care Instructions  Your Care Instructions     A high-fiber diet may help you relieve constipation and feel less bloated. Your doctor and dietitian will help you make a high-fiber eating plan based on your personal needs. The plan will include the things you like to eat. It will also make sure that you get 30 grams of fiber a day. Before you make changes to the way you eat, be sure to talk with your doctor or dietitian. Follow-up care is a key part of your treatment and safety. Be sure to make and go to all appointments, and call your doctor if you are having problems. It's also a good idea to know your test results and keep a list of the medicines you take. How can you care for yourself at home? · You can increase how much fiber you get if you eat more of certain foods. These foods include:  ? Whole-grain breads and cereals. ? Fruits, such as pears, apples, and peaches.  Eat the skins, peels, and seeds, if you can.  ? Vegetables, such as broccoli, cabbage, spinach, carrots, asparagus, and squash. ? Starchy vegetables. These include potatoes with skins, kidney beans, and lima beans. · Take a fiber supplement every day if your doctor recommends it. Examples are Benefiber, Citrucel, FiberCon, and Metamucil. Ask your doctor how much to take. · Drink plenty of fluids. If you have kidney, heart, or liver disease and have to limit fluids, talk with your doctor before you increase the amount of fluids you drink. · Get some exercise every day. Exercise helps stool move through the colon. It also helps prevent constipation. · Keep a food diary. Try to notice and write down what foods cause gas, pain, or other symptoms. Then you can avoid these foods. Where can you learn more? Go to http://www.gray.com/  Enter W736 in the search box to learn more about \"High-Fiber Diet: Care Instructions. \"  Current as of: December 17, 2020               Content Version: 12.8  © 2006-2021 [employer]. Care instructions adapted under license by [employer] (which disclaims liability or warranty for this information). If you have questions about a medical condition or this instruction, always ask your healthcare professional. [place] [address] any warranty or liability for your use of this information. Candice Robert .Patient armband removed and shredded

## 2021-04-12 NOTE — ANESTHESIA POSTPROCEDURE EVALUATION
Procedure(s):  COLONOSCOPY with polypectomies. MAC    Anesthesia Post Evaluation      Multimodal analgesia: multimodal analgesia used between 6 hours prior to anesthesia start to PACU discharge  Patient location during evaluation: bedside  Patient participation: complete - patient participated  Level of consciousness: awake  Pain management: adequate  Airway patency: patent  Anesthetic complications: no  Cardiovascular status: stable  Respiratory status: acceptable  Hydration status: acceptable  Post anesthesia nausea and vomiting:  controlled      INITIAL Post-op Vital signs:   Vitals Value Taken Time   /61 04/12/21 1139   Temp 36.6 °C (97.8 °F) 04/12/21 1130   Pulse 61 04/12/21 1143   Resp 14 04/12/21 1143   SpO2 98 % 04/12/21 1143   Vitals shown include unvalidated device data.

## 2021-04-12 NOTE — H&P
[email or web address]  [phone]    Gastroenterology pre op History and Physical     Impression:   1. High risk colon cancer screening exam      Plan:     1. Colonoscopy    Addendum: All lab tests orders pertaining to the procedure have been ordered by the anesthesia personnel and results will be addressed by the anesthesia team      Chief Complaint: high risk colon cancer screening exam.      HPI:  Bart Chery is a 66 y.o. male who is being seen on consult for high risk colon cancer screening with colonoscopy.  There is a previous history of colon polyps, and the patient returns for a surveillence exam    PMH:   Past Medical History:   Diagnosis Date    ED (erectile dysfunction)     Essential hypertension     Gout     Hypogonadism in male     Hypotestosteronism     Ill-defined condition     ESSENTIAL TREMORS    Legally blind     Nocturia     Nodular prostate without urinary obstruction     Prostate cancer ([facility] [address] 2/14/13    PNBx-T2a - Prostatic Volume: 26.7 grams - Jeffery 6 (3 + 3) -  Dr. Reji Yoder    Retinitis pigmentosa of both eyes     Seasonal allergic reaction     Thyroid disease     Nodule    Tremors of nervous system        PSH:   Past Surgical History:   Procedure Laterality Date    HX APPENDECTOMY      HX BACK SURGERY  1982    HX CARPAL TUNNEL RELEASE      HX CATARACT REMOVAL      HX HERNIA REPAIR      umbilical       Social HX:   Social History     Socioeconomic History    Marital status:      Spouse name: Not on file    Number of children: Not on file    Years of education: Not on file    Highest education level: Not on file   Occupational History    Not on file   Social Needs    Financial resource strain: Not on file    Food insecurity     Worry: Not on file     Inability: Not on file    Transportation needs     Medical: Not on file     Non-medical: Not on file   Tobacco Use    Smoking status: Never Smoker    Smokeless tobacco: Never Used   Substance and Sexual Activity    Alcohol use: Not Currently     Comment: seldom    Drug use: Never    Sexual activity: Yes   Lifestyle    Physical activity     Days per week: Not on file     Minutes per session: Not on file    Stress: Not on file   Relationships    Social connections     Talks on phone: Not on file     Gets together: Not on file     Attends [employer] service: Not on file     Active member of club or organization: Not on file     Attends meetings of clubs or organizations: Not on file     Relationship status: Not on file    Intimate partner violence     Fear of current or ex partner: Not on file     Emotionally abused: Not on file     Physically abused: Not on file     Forced sexual activity: Not on file   Other Topics Concern    Not on file   Social History Narrative    Not on file       FHX:   Family History   Problem Relation Age of Onset    Cancer Sister        Allergy:   Allergies   Allergen Reactions    Contrast Agent [Iodine] Hives    Iodinated Contrast Media Itching       Home Medications:     Medications Prior to Admission   Medication Sig    [START ON 4/16/2021] acetaminophen-codeine (Tylenol-Codeine #3) 300-30 mg per tablet Take 1 Tab by mouth three (3) times daily as needed for Pain for up to 30 days. Max Daily Amount: 3 Tabs. (Patient taking differently: Take 1 Tab by mouth three (3) times daily. Indications: pain)    diclofenac (VOLTAREN) 1 % gel Apply  to affected area four (4) times daily. Apply to right hip 4 times per day as directed    allopurinol (ZYLOPRIM) 100 mg tablet Take 100 mg by mouth daily.  hydrocortisone (ANUSOL-HC) 2.5 % rectal cream Insert  into rectum two (2) times a day. Use as needed as directed    butenafine (MENTAX) 1 % topical cream Apply  to affected area as needed.  fluticasone (FLONASE) 50 mcg/actuation nasal spray 2 Sprays by Both Nostrils route daily as needed.  DULoxetine (CYMBALTA) 30 mg capsule Take 30 mg by mouth daily.     NIFEdipine ER (Adalat CC) 60 mg ER tablet Take 60 mg by mouth daily.  spironolactone (ALDACTONE) 25 mg tablet Take 25 mg by mouth two (2) times a day.  topiramate (Topamax) 100 mg tablet Take 100 mg by mouth two (2) times a day. One tablet in AM , Two tablets every PM  Indications: muscle tremors    polyethylene glycol (Miralax) 17 gram/dose powder Take 17 g by mouth daily as needed for Constipation. (Patient taking differently: Take 17 g by mouth daily.)    alprostadil (MUSE) 1,000 mcg supp 1,000 mcg by IntraURETHral route as needed (for ED.).  alprostadil (MUSE) 500 mcg supp 500 mcg by Urethral route as needed. Review of Systems:     Constitutional: No fevers, chills, weight loss, fatigue. Skin: No rashes, pruritis, jaundice, ulcerations, erythema. HENT: No headaches, nosebleeds, sinus pressure, rhinorrhea, sore throat. Eyes: No visual changes, blurred vision, eye pain, photophobia, jaundice. Cardiovascular: No chest pain, heart palpitations. Respiratory: No cough, SOB, wheezing, chest discomfort, orthopnea. Gastrointestinal:    Genitourinary: No dysuria, bleeding, discharge, pyuria. Musculoskeletal: No weakness, arthralgias, wasting. Endo: No sweats. Heme: No bruising, easy bleeding. Allergies: As noted. Neurological: Cranial nerves intact. Alert and oriented. Gait not assessed. Psychiatric:  No anxiety, depression, hallucinations. Visit Vitals  Ht 5' 10\" (1.778 m)   Wt 100.2 kg (221 lb)   BMI 31.71 kg/m²       Physical Assessment:     constitutional: appearance: well developed, well nourished, normal habitus, no deformities, in no acute distress. skin: inspection: no rashes, ulcers, icterus or other lesions; no clubbing or telangiectasias. palpation: no induration or subcutaneos nodules. eyes: inspection: normal conjunctivae and lids; no jaundice pupils: symmetrical, normoreactive to light, normal accommodation and size.    ENMT: mouth: normal oral mucosa,lips and gums; good dentition. oropharynx: normal tongue, hard and soft palate; posterior pharynx without erithema, exudate or lesions. neck: thyroid: normal size, consistency and position; no masses or tenderness. respiratory: effort: normal chest excursion; no intercostal retraction or accessory muscle use. cardiovascular: abdominal aorta: normal size and position; no bruits. palpation: PMI of normal size and position; normal rhythm; no thrill or murmurs. abdominal: abdomen: normal consistency; no tenderness or masses. hernias: no hernias appreciated. liver: normal size and consistency. spleen: not palpable. rectal: hemoccult/guaiac: not performed. musculoskeletal: digits and nails: no clubbing, cyanosis, petechiae or other inflammatory conditions. gait: normal gait and station head and neck: normal range of motion; no pain, crepitation or contracture. spine/ribs/pelvis: normal range of motion; no pain, deformity or contracture. lymphatic: axilae: not palpable. groin: not palpable. neck: within normal limits. other: not palpable. neurologic: cranial nerves: II-XII normal.   psychiatric: judgement/insight: within normal limits. memory: within normal limits for recent and remote events. mood and affect: no evidence of depression, anxiety or agitation. orientation: oriented to time, space and person. Basic Metabolic Profile   No results for input(s): NA, K, CL, CO2, BUN, GLU, CA, MG, PHOS in the last 72 hours. No lab exists for component: CREAT      CBC w/Diff    No results for input(s): WBC, RBC, HGB, HCT, MCV, MCH, MCHC, RDW, PLT, HGBEXT, HCTEXT, PLTEXT in the last 72 hours. No lab exists for component: MPV No results for input(s): GRANS, LYMPH, EOS, PRO, MYELO, METAS, BLAST in the last 72 hours. No lab exists for component: MONO, BASO     Hepatic Function   No results for input(s): ALB, TP, TBILI, AP, AML, LPSE in the last 72 hours.     No lab exists for component: DBILI, GPT, SGOT       Terrell Watts MD  [facility], [facility]  [email or web address]

## 2021-04-12 NOTE — ANESTHESIA PREPROCEDURE EVALUATION
Relevant Problems   PERSONAL HX & FAMILY HX OF CANCER   (+) Prostate cancer ([facility] [address]       Anesthetic History   No history of anesthetic complications            Review of Systems / Medical History  Patient summary reviewed and pertinent labs reviewed    Pulmonary  Within defined limits                 Neuro/Psych              Cardiovascular    Hypertension                   GI/Hepatic/Renal                Endo/Other      Hypothyroidism  Arthritis     Other Findings              Physical Exam    Airway  Mallampati: II  TM Distance: 4 - 6 cm  Neck ROM: decreased range of motion   Mouth opening: Diminished (comment)     Cardiovascular    Rhythm: regular  Rate: normal         Dental    Dentition: Poor dentition     Pulmonary      Decreased breath sounds: bilateral           Abdominal  GI exam deferred       Other Findings            Anesthetic Plan    ASA: 3  Anesthesia type: MAC            Anesthetic plan and risks discussed with: Patient

## 2021-07-12 ENCOUNTER — OFFICE VISIT (OUTPATIENT)
Dept: ORTHOPEDIC SURGERY | Age: 76
End: 2021-07-12
Payer: MEDICARE

## 2021-07-12 ENCOUNTER — HOME HEALTH ADMISSION (OUTPATIENT)
Dept: HOME HEALTH SERVICES | Facility: HOME HEALTH | Age: 76
End: 2021-07-12
Payer: MEDICARE

## 2021-07-12 VITALS
OXYGEN SATURATION: 97 % | RESPIRATION RATE: 16 BRPM | DIASTOLIC BLOOD PRESSURE: 72 MMHG | SYSTOLIC BLOOD PRESSURE: 146 MMHG | HEART RATE: 71 BPM | WEIGHT: 217 LBS | TEMPERATURE: 98.2 F | BODY MASS INDEX: 31.14 KG/M2

## 2021-07-12 DIAGNOSIS — R53.1 WEAKNESS: ICD-10-CM

## 2021-07-12 DIAGNOSIS — G47.9 SLEEP DISORDER: ICD-10-CM

## 2021-07-12 DIAGNOSIS — R29.898 MUSCULAR DECONDITIONING: ICD-10-CM

## 2021-07-12 DIAGNOSIS — K59.03 DRUG INDUCED CONSTIPATION: ICD-10-CM

## 2021-07-12 DIAGNOSIS — M47.816 LUMBAR SPONDYLOSIS: ICD-10-CM

## 2021-07-12 DIAGNOSIS — H54.8 LEGALLY BLIND: ICD-10-CM

## 2021-07-12 DIAGNOSIS — G89.29 OTHER CHRONIC PAIN: Primary | ICD-10-CM

## 2021-07-12 DIAGNOSIS — R26.9 GAIT ABNORMALITY: ICD-10-CM

## 2021-07-12 DIAGNOSIS — R29.898 WEAKNESS OF BOTH LEGS: ICD-10-CM

## 2021-07-12 DIAGNOSIS — Z79.891 USE OF OPIATES FOR THERAPEUTIC PURPOSES: ICD-10-CM

## 2021-07-12 PROCEDURE — G8536 NO DOC ELDER MAL SCRN: HCPCS | Performed by: PHYSICAL MEDICINE & REHABILITATION

## 2021-07-12 PROCEDURE — G8427 DOCREV CUR MEDS BY ELIG CLIN: HCPCS | Performed by: PHYSICAL MEDICINE & REHABILITATION

## 2021-07-12 PROCEDURE — 3288F FALL RISK ASSESSMENT DOCD: CPT | Performed by: PHYSICAL MEDICINE & REHABILITATION

## 2021-07-12 PROCEDURE — G8417 CALC BMI ABV UP PARAM F/U: HCPCS | Performed by: PHYSICAL MEDICINE & REHABILITATION

## 2021-07-12 PROCEDURE — 99214 OFFICE O/P EST MOD 30 MIN: CPT | Performed by: PHYSICAL MEDICINE & REHABILITATION

## 2021-07-12 PROCEDURE — 1100F PTFALLS ASSESS-DOCD GE2>/YR: CPT | Performed by: PHYSICAL MEDICINE & REHABILITATION

## 2021-07-12 PROCEDURE — G8432 DEP SCR NOT DOC, RNG: HCPCS | Performed by: PHYSICAL MEDICINE & REHABILITATION

## 2021-07-12 RX ORDER — ACETAMINOPHEN AND CODEINE PHOSPHATE 300; 30 MG/1; MG/1
1 TABLET ORAL
Qty: 90 TABLET | Refills: 2 | Status: SHIPPED | OUTPATIENT
Start: 2021-07-14 | End: 2021-08-13

## 2021-07-12 RX ORDER — ACETAMINOPHEN AND CODEINE PHOSPHATE 300; 30 MG/1; MG/1
TABLET ORAL
COMMUNITY
Start: 2021-06-14

## 2021-07-12 NOTE — PATIENT INSTRUCTIONS
Low Back Arthritis: Exercises  Introduction  Here are some examples of typical rehabilitation exercises for your condition. Start each exercise slowly. Ease off the exercise if you start to have pain. Your doctor or physical therapist will tell you when you can start these exercises and which ones will work best for you. When you are not being active, find a comfortable position for rest. Some people are comfortable on the floor or a medium-firm bed with a small pillow under their head and another under their knees. Some people prefer to lie on their side with a pillow between their knees. Don't stay in one position for too long. Take short walks (10 to 20 minutes) every 2 to 3 hours. Avoid slopes, hills, and stairs until you feel better. Walk only distances you can manage without pain, especially leg pain. How to do the exercises  Pelvic tilt   1. Lie on your back with your knees bent. 2. \"Brace\" your stomachtighten your muscles by pulling in and imagining your belly button moving toward your spine. 3. Press your lower back into the floor. You should feel your hips and pelvis rock back. 4. Hold for 6 seconds while breathing smoothly. 5. Relax and allow your pelvis and hips to rock forward. 6. Repeat 8 to 12 times. Back stretches   1. Get down on your hands and knees on the floor. 2. Relax your head and allow it to droop. Round your back up toward the ceiling until you feel a nice stretch in your upper, middle, and lower back. Hold this stretch for as long as it feels comfortable, or about 15 to 30 seconds. 3. Return to the starting position with a flat back while you are on your hands and knees. 4. Let your back sway by pressing your stomach toward the floor. Lift your buttocks toward the ceiling. 5. Hold this position for 15 to 30 seconds. 6. Repeat 2 to 4 times. Follow-up care is a key part of your treatment and safety.  Be sure to make and go to all appointments, and call your doctor if you are having problems. It's also a good idea to know your test results and keep a list of the medicines you take. Where can you learn more? Go to http://www.[employer].com/  Enter T094 in the search box to learn more about \"Low Back Arthritis: Exercises. \"  Current as of: November 16, 2020               Content Version: 12.8  © 2006-2021 [employer]. Care instructions adapted under license by [employer] (which disclaims liability or warranty for this information). If you have questions about a medical condition or this instruction, always ask your healthcare professional. [place] [address] any warranty or liability for your use of this information. Learning About Sleeping Well  What does sleeping well mean? Sleeping well means getting enough sleep. How much sleep is enough varies among people. The number of hours you sleep is not as important as how you feel when you wake up. If you do not feel refreshed, you probably need more sleep. Another sign of not getting enough sleep is feeling tired during the day. The average total nightly sleep time is 7½ to 8 hours. Healthy adults may need a little more or a little less than this. Why is getting enough sleep important? Getting enough quality sleep is a basic part of good health. When your sleep suffers, your mood and your thoughts can suffer too. You may find yourself feeling more grumpy or stressed. Not getting enough sleep also can lead to serious problems, including injury, accidents, anxiety, and depression. What might cause poor sleeping? Many things can cause sleep problems, including:  · Stress. Stress can be caused by fear about a single event, such as giving a speech. Or you may have ongoing stress, such as worry about work or school. · Depression, anxiety, and other mental or emotional conditions. · Changes in your sleep habits or surroundings.  This includes changes that happen where you sleep, such as noise, light, or sleeping in a different bed. It also includes changes in your sleep pattern, such as having jet lag or working a late shift. · Health problems, such as pain, breathing problems, and restless legs syndrome. · Lack of regular exercise. How can you help yourself? Here are some tips that may help you sleep more soundly and wake up feeling more refreshed. Your sleeping area   · Use your bedroom only for sleeping and sex. A bit of light reading may help you fall asleep. But if it doesn't, do your reading elsewhere in the house. Don't watch TV in bed. · Be sure your bed is big enough to stretch out comfortably, especially if you have a sleep partner. · Keep your bedroom quiet, dark, and cool. Use curtains, blinds, or a sleep mask to block out light. To block out noise, use earplugs, soothing music, or a \"white noise\" machine. Your evening and bedtime routine   · Create a relaxing bedtime routine. You might want to take a warm shower or bath, listen to soothing music, or drink a cup of noncaffeinated tea. · Go to bed at the same time every night. And get up at the same time every morning, even if you feel tired. What to avoid   · Limit caffeine (coffee, tea, caffeinated sodas) during the day, and don't have any for at least 4 to 6 hours before bedtime. · Don't drink alcohol before bedtime. Alcohol can cause you to wake up more often during the night. · Don't smoke or use tobacco, especially in the evening. Nicotine can keep you awake. · Don't take naps during the day, especially close to bedtime. · Don't lie in bed awake for too long. If you can't fall asleep, or if you wake up in the middle of the night and can't get back to sleep within 15 minutes or so, get out of bed and go to another room until you feel sleepy. · Don't take medicine right before bed that may keep you awake or make you feel hyper or energized.  Your doctor can tell you if your medicine may do this and if you can take it earlier in the day. If you can't sleep   · Imagine yourself in a peaceful, pleasant scene. Focus on the details and feelings of being in a place that is relaxing. · Get up and do a quiet or boring activity until you feel sleepy. · Don't drink any liquids after 6 p.m. if you wake up often because you have to go to the bathroom. Where can you learn more? Go to http://www.[employer].com/  Enter A104 in the search box to learn more about \"Learning About Sleeping Well. \"  Current as of: September 23, 2020               Content Version: 12.8  © 2006-2021 [employer], [facility]. Care instructions adapted under license by [employer] (which disclaims liability or warranty for this information). If you have questions about a medical condition or this instruction, always ask your healthcare professional. [address] any warranty or liability for your use of this information.

## 2021-07-12 NOTE — PROGRESS NOTES
[facility]  [address] [place], [facility] [place] [address]  Phone: [phone]  Fax: [phone]    Pt's YOB: 1945    ASSESSMENT   Diagnoses and all orders for this visit:    1. Other chronic pain  -     acetaminophen-codeine (Tylenol-Codeine #3) 300-30 mg per tablet; Take 1 Tablet by mouth three (3) times daily as needed for Pain for up to 30 days. Max Daily Amount: 3 Tablets. 2. Use of opiates for therapeutic purposes  -     acetaminophen-codeine (Tylenol-Codeine #3) 300-30 mg per tablet; Take 1 Tablet by mouth three (3) times daily as needed for Pain for up to 30 days. Max Daily Amount: 3 Tablets. 3. Weakness of both legs  -     REFERRAL TO HOME HEALTH    4. Muscular deconditioning  -     REFERRAL TO HOME HEALTH    5. Weakness  -     REFERRAL TO HOME HEALTH    6. Lumbar spondylosis    7. Drug induced constipation    8. Gait abnormality    9. Legally blind    10. Sleep disorder         IMPRESSION AND PLAN:  Juancarlos Portillo is a 66 y.o. male with history of chronic lumbar pain and presents to the office today for follow up. Pt reports weakness in the legs. He takes Tylenol #3 1 tab 3 x daily as his pain warrants and uses Voltaren 1% gel as needed. 1) Pt was given information on lumbar arthritis exercises. 2) He received a refill of Tylenol #3 1 tab TID prn pain. 3) Pt was given information on sleep hygiene. 4) He was referred to home PT for balance, gait, and strengthening-- he is getting weaker and concern for fall risk -- his son also requests some structured therapy for pt. 5) Mr. Batsheva Lima has a reminder for a \"due or due soon\" health maintenance. I have asked that he contact his primary care provider, None, for follow-up on this health maintenance. 6)  demonstrated consistency with prescribing.    7) Pt will continue with Miralax for constipation  Follow-up and Dispositions    · Return in about 3 months (around 10/12/2021) for Medication follow up. HISTORY OF PRESENT ILLNESS:  Aren Chow is a 66 y.o. male with history of chronic lumbar pain and presents to the office today with his son for follow up. He denies any change in symptoms or falls since his last office visit. Pt reports weakness in the legs and admits that he is not very active throughout the day. His son notes that his father is having increased weakness and he states would benefit from structured therapy. He also has had some home modifications through the [place] to increase and enhance safety. He takes Tylenol #3 1 tab 3 x daily as his pain warrants with benefit. Pt notes that his pain medication allows him to continue with ADL's. He admits to occasional constipation but this is well controlled with Miralax. Pt also uses Voltaren 1% gel as needed. Pt notes he received both doses of the COVID-19 vaccination. Pt at this time desires to continue with current care.       Pain Scale: 6/10    PCP: None     Past Medical History:   Diagnosis Date    ED (erectile dysfunction)     Essential hypertension     Gout     Hypogonadism in male     Hypotestosteronism     Ill-defined condition     ESSENTIAL TREMORS    Legally blind     Nocturia     Nodular prostate without urinary obstruction     Prostate cancer ([facility] [address] 2/14/13    PNBx-T2a - Prostatic Volume: 26.7 grams - [place] 6 (3 + 3) -  Dr. Letha Reyes    Retinitis pigmentosa of both eyes     Seasonal allergic reaction     Thyroid disease     Nodule    Tremors of nervous system         Social History     Socioeconomic History    Marital status:      Spouse name: Not on file    Number of children: Not on file    Years of education: Not on file    Highest education level: Not on file   Occupational History    Not on file   Tobacco Use    Smoking status: Never Smoker    Smokeless tobacco: Never Used   Vaping Use    Vaping Use: Never used   Substance and Sexual Activity    Alcohol use: Not Currently     Comment: seldom    Drug use: Never    Sexual activity: Yes   Other Topics Concern    Not on file   Social History Narrative    Not on file     Social Determinants of Health     Financial Resource Strain:     Difficulty of Paying Living Expenses:    Food Insecurity:     Worried About Running Out of Food in the Last Year:     [address] in the Last Year:    Transportation Needs:     Lack of Transportation (Medical):  Lack of Transportation (Non-Medical):    Physical Activity:     Days of Exercise per Week:     Minutes of Exercise per Session:    Stress:     Feeling of Stress :    Social Connections:     Frequency of Communication with Friends and Family:     Frequency of Social Gatherings with Friends and Family:     Attends [employer] Services:     Active Member of Clubs or Organizations:     Attends Club or Organization Meetings:     Marital Status:    Intimate Partner Violence:     Fear of Current or Ex-Partner:     Emotionally Abused:     Physically Abused:     Sexually Abused:        Current Outpatient Medications   Medication Sig Dispense Refill    acetaminophen-codeine (TYLENOL #3) 300-30 mg per tablet TAKE 1 TABLET BY MOUTH 3 TIMES A DAY AS NEEDED FOR PAIN FOR UP TO 30 DAYS (MAXIMUM 3 TABS PER DAY)      acetaminophen-codeine (Tylenol-Codeine #3) 300-30 mg per tablet Take 1 Tablet by mouth three (3) times daily as needed for Pain for up to 30 days. Max Daily Amount: 3 Tablets. 90 Tablet 2    polyethylene glycol (Miralax) 17 gram/dose powder Take 17 g by mouth daily as needed for Constipation. (Patient taking differently: Take 17 g by mouth daily.) 510 g 5    diclofenac (VOLTAREN) 1 % gel Apply  to affected area four (4) times daily. Apply to right hip 4 times per day as directed 5 Each 3    allopurinol (ZYLOPRIM) 100 mg tablet Take 100 mg by mouth every other day.  hydrocortisone (ANUSOL-HC) 2.5 % rectal cream Insert  into rectum two (2) times a day.  Use as needed as directed 90 g 1    butenafine (MENTAX) 1 % topical cream Apply  to affected area as needed.  fluticasone (FLONASE) 50 mcg/actuation nasal spray 2 Sprays by Both Nostrils route daily as needed.  DULoxetine (CYMBALTA) 30 mg capsule Take 30 mg by mouth daily.  NIFEdipine ER (Adalat CC) 60 mg ER tablet Take 60 mg by mouth daily.  spironolactone (ALDACTONE) 25 mg tablet Take 25 mg by mouth two (2) times a day.  topiramate (Topamax) 100 mg tablet Take 100 mg by mouth two (2) times a day. One tablet in AM , Two tablets every PM  Indications: muscle tremors         Allergies   Allergen Reactions    Contrast Agent [Iodine] Hives    Iodinated Contrast Media Itching         REVIEW OF SYSTEMS    Constitutional: Negative for fever, chills, or weight change. Respiratory: Negative for cough or shortness of breath. Cardiovascular: Negative for chest pain or palpitations. Gastrointestinal: Negative for acid reflux, change in bowel habits, or constipation. Genitourinary: Negative for dysuria and flank pain. Musculoskeletal: Positive for lumbar pain and leg weakness. Skin: Negative for rash. Neurological: Negative for headaches, dizziness, or numbness. Endo/Heme/Allergies: Negative for increased bruising. Psychiatric/Behavioral: Positive for difficulty with sleep. As per HPI    PHYSICAL EXAMINATION  Visit Vitals  BP (!) 146/72 (BP 1 Location: Left upper arm)   Pulse 71   Temp 98.2 °F (36.8 °C)   Resp 16   Wt 217 lb (98.4 kg)   SpO2 97%   BMI 31.14 kg/m²       Constitutional: Awake, alert, and in no acute distress. Neurological: 1+ symmetrical DTRs in the upper extremities. 1+ symmetrical DTRs in the lower extremities. Sensation to light touch is intact. Negative Malloy's sign bilaterally. Skin: warm, dry, and intact. Musculoskeletal: Good range of motion in both shoulders. Tenderness to palpation in the lower lumbar region. Moderate pain with extension and axial loading. Improvement with forward flexion. No pain with internal or external rotation of his hips. Negative straight leg raise bilaterally.     Patient ambulates with the assistance of a rolling walker. Hip Flex  Quads Hamstrings Ankle DF EHL Ankle PF   Right -4/5 -4/5 -4/5  4/5  4/5  4/5   Left -4/5 -4/5 -4/5  4/5  4/5  4/5     IMAGING:    Lumbar spine MRI from 07/24/2020 were personally reviewed with the patient and demonstrated:          Results from [place] encounter on 07/24/20   MRI LUMB SPINE W WO CONT     Narrative Sagittal and axial multisequence MR images of lumbar spine were obtained without  and with 20 cc Dotarem gadolinium IV contrast     HISTORY: Back pain and left sciatica.     COMPARISON: August 6, 2010     Loss of lumbar lordosis, similar to before. Trace retrolisthesis L5.     No focal compression fracture. Patchy fat signal in the bone marrow with no  pathologic marrow signal abnormalities. No abnormal enhancement. Conus  medullaris ends at L1 with normal morphology and signal intensity.     Multiple large left renal cysts.     L1-L2: Posterior lateral corner disc protrusion. No central stenosis. Mild facet  and ligamentous hypertrophy. Mild foraminal narrowing with no nerve root  compression.     L2-L3: Posterior disc bulge and small left paracentral disc protrusion. No  significant central stenosis.  Facet and ligamentous hypertrophy. Mild foraminal  narrowing with no nerve root compression.     L3-L4: Mild posterior disc bulge. Facet and ligamentous hypertrophy. Posterior  epidural fat present. AP canal measures 9.9 mm  No significant central stenosis. Mild foraminal narrowing with no nerve root compression.     L4-L5: Mild posterior disc bulge with left lateral disc protrusion/posterior  lateral annular tear. Facet and ligamentous hypertrophy. No significant central  stenosis. Mild to moderate foraminal stenosis but no definite exiting nerve root  compression.  Possible mild arachnoiditis     L5-S1: Left laminotomy. Mild posterior disc bulge and endplate spondylosis,  slightly worse on the left. No central stenosis. Right facet and ligamentous  hypertrophy. Moderate right foraminal stenosis with compression of the right  exiting L5 nerve root. Mild-to-moderate left foraminal stenosis, mild  compression of left exiting L5 nerve root also possible.        Impression IMPRESSION: Degenerative disease worst at L4-L5 and L5-S1, with more severe  foraminal stenosis and nerve root compression at L5-S1. Right slightly worse  than left. However, patient is more symptomatic on the left. Written by Cassandra Altman, as dictated by Horace Carlisle MD.  I, Dr. Horace Carlisle confirm that all documentation is accurate.

## 2021-07-13 ENCOUNTER — HOME CARE VISIT (OUTPATIENT)
Dept: SCHEDULING | Facility: HOME HEALTH | Age: 76
End: 2021-07-13
Payer: MEDICARE

## 2021-07-13 VITALS
SYSTOLIC BLOOD PRESSURE: 120 MMHG | HEART RATE: 78 BPM | TEMPERATURE: 97.2 F | DIASTOLIC BLOOD PRESSURE: 62 MMHG | OXYGEN SATURATION: 95 % | RESPIRATION RATE: 16 BRPM

## 2021-07-13 PROCEDURE — 3331090001 HH PPS REVENUE CREDIT

## 2021-07-13 PROCEDURE — G0151 HHCP-SERV OF PT,EA 15 MIN: HCPCS

## 2021-07-13 PROCEDURE — 3331090002 HH PPS REVENUE DEBIT

## 2021-07-13 PROCEDURE — 400013 HH SOC

## 2021-07-13 PROCEDURE — 3331090003 HH PPS REVENUE ADJ

## 2021-07-13 PROCEDURE — 400018 HH-NO PAY CLAIM PROCEDURE

## 2021-07-14 ENCOUNTER — HOME CARE VISIT (OUTPATIENT)
Dept: HOME HEALTH SERVICES | Facility: HOME HEALTH | Age: 76
End: 2021-07-14
Payer: MEDICARE

## 2021-07-14 PROCEDURE — 3331090001 HH PPS REVENUE CREDIT

## 2021-07-14 PROCEDURE — 3331090002 HH PPS REVENUE DEBIT

## 2021-07-15 PROCEDURE — 3331090002 HH PPS REVENUE DEBIT

## 2021-07-15 PROCEDURE — 3331090001 HH PPS REVENUE CREDIT

## 2021-07-16 PROCEDURE — 3331090001 HH PPS REVENUE CREDIT

## 2021-07-16 PROCEDURE — 3331090002 HH PPS REVENUE DEBIT

## 2021-07-17 PROCEDURE — 3331090002 HH PPS REVENUE DEBIT

## 2021-07-17 PROCEDURE — 3331090001 HH PPS REVENUE CREDIT

## 2021-07-18 PROCEDURE — 3331090001 HH PPS REVENUE CREDIT

## 2021-07-18 PROCEDURE — 3331090002 HH PPS REVENUE DEBIT

## 2021-07-19 PROCEDURE — 3331090002 HH PPS REVENUE DEBIT

## 2021-07-19 PROCEDURE — 3331090001 HH PPS REVENUE CREDIT

## 2021-07-20 PROCEDURE — 3331090001 HH PPS REVENUE CREDIT

## 2021-07-20 PROCEDURE — 3331090002 HH PPS REVENUE DEBIT

## 2021-07-21 PROCEDURE — 3331090001 HH PPS REVENUE CREDIT

## 2021-07-21 PROCEDURE — 3331090002 HH PPS REVENUE DEBIT

## 2021-07-22 PROCEDURE — 3331090002 HH PPS REVENUE DEBIT

## 2021-07-22 PROCEDURE — 3331090001 HH PPS REVENUE CREDIT

## 2021-07-23 PROCEDURE — 3331090002 HH PPS REVENUE DEBIT

## 2021-07-23 PROCEDURE — 3331090001 HH PPS REVENUE CREDIT

## 2021-07-24 PROCEDURE — 3331090001 HH PPS REVENUE CREDIT

## 2021-07-24 PROCEDURE — 3331090002 HH PPS REVENUE DEBIT

## 2021-07-25 PROCEDURE — 3331090002 HH PPS REVENUE DEBIT

## 2021-07-25 PROCEDURE — 3331090001 HH PPS REVENUE CREDIT

## 2021-07-26 PROCEDURE — 3331090001 HH PPS REVENUE CREDIT

## 2021-07-26 PROCEDURE — 3331090002 HH PPS REVENUE DEBIT

## 2021-07-27 PROCEDURE — 3331090002 HH PPS REVENUE DEBIT

## 2021-07-27 PROCEDURE — 3331090001 HH PPS REVENUE CREDIT

## 2021-07-28 PROCEDURE — 3331090002 HH PPS REVENUE DEBIT

## 2021-07-28 PROCEDURE — 3331090001 HH PPS REVENUE CREDIT

## 2021-07-29 PROCEDURE — 3331090002 HH PPS REVENUE DEBIT

## 2021-07-29 PROCEDURE — 3331090001 HH PPS REVENUE CREDIT

## 2021-07-30 PROCEDURE — 3331090001 HH PPS REVENUE CREDIT

## 2021-07-30 PROCEDURE — 3331090002 HH PPS REVENUE DEBIT

## 2021-07-31 PROCEDURE — 3331090002 HH PPS REVENUE DEBIT

## 2021-07-31 PROCEDURE — 3331090001 HH PPS REVENUE CREDIT

## 2021-08-01 PROCEDURE — 3331090001 HH PPS REVENUE CREDIT

## 2021-08-01 PROCEDURE — 3331090002 HH PPS REVENUE DEBIT

## 2021-08-02 ENCOUNTER — HOME CARE VISIT (OUTPATIENT)
Dept: HOME HEALTH SERVICES | Facility: HOME HEALTH | Age: 76
End: 2021-08-02
Payer: MEDICARE

## 2021-08-02 PROCEDURE — 3331090002 HH PPS REVENUE DEBIT

## 2021-08-02 PROCEDURE — 3331090001 HH PPS REVENUE CREDIT

## 2021-08-02 PROCEDURE — 3331090003 HH PPS REVENUE ADJ

## 2021-10-01 ENCOUNTER — TRANSCRIBE ORDER (OUTPATIENT)
Dept: RADIATION THERAPY | Age: 76
End: 2021-10-01

## 2021-10-01 DIAGNOSIS — C61 MALIGNANT NEOPLASM OF PROSTATE (HCC): Primary | ICD-10-CM

## 2021-10-07 ENCOUNTER — HOSPITAL ENCOUNTER (OUTPATIENT)
Dept: LAB | Age: 76
Discharge: HOME | End: 2021-10-07
Payer: MEDICARE

## 2021-10-07 DIAGNOSIS — C61 MALIGNANT NEOPLASM OF PROSTATE (HCC): ICD-10-CM

## 2021-10-07 LAB — PSA SERPL-MCNC: 0.1 NG/ML (ref 0–4)

## 2021-10-07 PROCEDURE — 36415 COLL VENOUS BLD VENIPUNCTURE: CPT

## 2021-10-07 PROCEDURE — 84153 ASSAY OF PSA TOTAL: CPT

## 2021-10-07 PROCEDURE — 84403 ASSAY OF TOTAL TESTOSTERONE: CPT

## 2021-10-09 LAB — TESTOST SERPL-MCNC: 229 NG/DL (ref 264–916)

## 2021-10-11 ENCOUNTER — OFFICE VISIT (OUTPATIENT)
Dept: ORTHOPEDIC SURGERY | Age: 76
End: 2021-10-11
Payer: MEDICARE

## 2021-10-11 VITALS
RESPIRATION RATE: 20 BRPM | BODY MASS INDEX: 30.78 KG/M2 | OXYGEN SATURATION: 99 % | HEIGHT: 70 IN | DIASTOLIC BLOOD PRESSURE: 76 MMHG | HEART RATE: 66 BPM | WEIGHT: 215 LBS | TEMPERATURE: 97.1 F | SYSTOLIC BLOOD PRESSURE: 124 MMHG

## 2021-10-11 DIAGNOSIS — M47.816 LUMBAR SPONDYLOSIS: ICD-10-CM

## 2021-10-11 DIAGNOSIS — M25.551 RIGHT HIP PAIN: ICD-10-CM

## 2021-10-11 DIAGNOSIS — R53.1 WEAKNESS: ICD-10-CM

## 2021-10-11 DIAGNOSIS — K59.03 DRUG INDUCED CONSTIPATION: ICD-10-CM

## 2021-10-11 DIAGNOSIS — M48.062 SPINAL STENOSIS OF LUMBAR REGION WITH NEUROGENIC CLAUDICATION: ICD-10-CM

## 2021-10-11 DIAGNOSIS — G89.29 OTHER CHRONIC PAIN: ICD-10-CM

## 2021-10-11 DIAGNOSIS — Z79.891 USE OF OPIATES FOR THERAPEUTIC PURPOSES: ICD-10-CM

## 2021-10-11 DIAGNOSIS — H54.8 LEGALLY BLIND: ICD-10-CM

## 2021-10-11 DIAGNOSIS — R26.9 GAIT ABNORMALITY: ICD-10-CM

## 2021-10-11 DIAGNOSIS — G89.29 OTHER CHRONIC PAIN: Primary | ICD-10-CM

## 2021-10-11 PROCEDURE — G8417 CALC BMI ABV UP PARAM F/U: HCPCS | Performed by: PHYSICAL MEDICINE & REHABILITATION

## 2021-10-11 PROCEDURE — G8427 DOCREV CUR MEDS BY ELIG CLIN: HCPCS | Performed by: PHYSICAL MEDICINE & REHABILITATION

## 2021-10-11 PROCEDURE — G8432 DEP SCR NOT DOC, RNG: HCPCS | Performed by: PHYSICAL MEDICINE & REHABILITATION

## 2021-10-11 PROCEDURE — G8536 NO DOC ELDER MAL SCRN: HCPCS | Performed by: PHYSICAL MEDICINE & REHABILITATION

## 2021-10-11 PROCEDURE — 99214 OFFICE O/P EST MOD 30 MIN: CPT | Performed by: PHYSICAL MEDICINE & REHABILITATION

## 2021-10-11 PROCEDURE — 1101F PT FALLS ASSESS-DOCD LE1/YR: CPT | Performed by: PHYSICAL MEDICINE & REHABILITATION

## 2021-10-11 NOTE — PROGRESS NOTES
[facility]  [address] [place] [address], [facility] [address]  Phone: [phone]  Fax: [phone]    Pt's YOB: 1945    ASSESSMENT   Diagnoses and all orders for this visit:    1. Other chronic pain  -     acetaminophen-codeine (Tylenol-Codeine #3) 300-30 mg per tablet; Take 1 Tablet by mouth every six to eight (6-8) hours as needed for Pain for up to 30 days. Max Daily Amount: 4 Tablets. -     DRUG SCREEN UR - W/ CONFIRM; Future    2. Use of opiates for therapeutic purposes  -     acetaminophen-codeine (Tylenol-Codeine #3) 300-30 mg per tablet; Take 1 Tablet by mouth every six to eight (6-8) hours as needed for Pain for up to 30 days. Max Daily Amount: 4 Tablets. -     DRUG SCREEN UR - W/ CONFIRM; Future    3. Lumbar spondylosis  -     acetaminophen-codeine (Tylenol-Codeine #3) 300-30 mg per tablet; Take 1 Tablet by mouth every six to eight (6-8) hours as needed for Pain for up to 30 days. Max Daily Amount: 4 Tablets. -     diclofenac (VOLTAREN) 1 % gel; Apply  to affected area four (4) times daily. Apply to right hip 4 times per day as directed    4. Drug induced constipation  -     polyethylene glycol (Miralax) 17 gram/dose powder; Take 17 g by mouth daily as needed for Constipation. 5. Right hip pain  -     diclofenac (VOLTAREN) 1 % gel; Apply  to affected area four (4) times daily. Apply to right hip 4 times per day as directed    6. Spinal stenosis of lumbar region with neurogenic claudication    7. Weakness    8. Gait abnormality    9. Legally blind         IMPRESSION AND PLAN:  Mikael Oconnell is a 66 y.o. male with history of chronic lumbar pain. He reports an acute exacerbation of pain radiating down the left leg to the knee since his last office visit with symptoms lasting about 3 weeks. Pt takes Tylenol #3 1 tab TID as his pain warrants. 1) Pt was given information on lumbar arthritis exercises.    2) He received a refill of Tylenol #3 to 1 tab Q6-8 hours prn pain. 3) A UDS was obtained. 4) Pt received a refill of Voltaren 1% gel. 5) Mr. David Scott has a reminder for a \"due or due soon\" health maintenance. I have asked that he contact his primary care provider, None, for follow-up on this health maintenance. 6)  demonstrated consistency with prescribing. 7) Last UDS from 4/6/2021 was consistent. Follow-up and Dispositions    · Return in about 3 months (around 1/11/2022) for Medication follow up. HISTORY OF PRESENT ILLNESS:  Hawa Nevarez is a 66 y.o. male with history of chronic lumbar pain and presents to the office today for follow up. He reports an acute exacerbation of pain in the lower back radiating down the left leg to the knee since his last office visit. Pt reports severe pain, difficulty turning in bed, and difficulty with ambulation x about 3 weeks. He denies any arm or leg weakness or falls since his last office visit. Of note, he had lumbar surgery in 1982 and he notes temporary relief with previous steroid injections. Pt has been prescribed Tylenol #3 and takes 1 tab TID as his pain warrants. He admits that he increased the Tylenol #3 to 4 tabs daily when his pain was severe. Pt also uses Voltaren gel as needed and requests a refill. He uses Miraliax as needed for constipation. Pt denies a history of diabetes mellitus and notes minimal relief when previously taking prednisone. Pt at this time desires to continue with current care.     Pain Scale: 6/10    PCP: None     Past Medical History:   Diagnosis Date    ED (erectile dysfunction)     Essential hypertension     Gout     Hypogonadism in male     Hypotestosteronism     Ill-defined condition     ESSENTIAL TREMORS    Legally blind     Nocturia     Nodular prostate without urinary obstruction     Prostate cancer ([facility] [address] 2/14/13    PNBx-T2a - Prostatic Volume: 26.7 grams - [place] 6 (3 + 3) -  Dr. Orlando Silva    Retinitis pigmentosa of both eyes  Seasonal allergic reaction     Thyroid disease     Nodule    Tremors of nervous system         Social History     Socioeconomic History    Marital status:      Spouse name: Not on file    Number of children: Not on file    Years of education: Not on file    Highest education level: Not on file   Occupational History    Not on file   Tobacco Use    Smoking status: Never Smoker    Smokeless tobacco: Never Used   Vaping Use    Vaping Use: Never used   Substance and Sexual Activity    Alcohol use: Not Currently     Comment: seldom    Drug use: Never    Sexual activity: Yes   Other Topics Concern    Not on file   Social History Narrative    Not on file     Social Determinants of Health     Financial Resource Strain:     Difficulty of Paying Living Expenses:    Food Insecurity:     Worried About Running Out of Food in the Last Year:     [address] in the Last Year:    Transportation Needs:     Lack of Transportation (Medical):  Lack of Transportation (Non-Medical):    Physical Activity:     Days of Exercise per Week:     Minutes of Exercise per Session:    Stress:     Feeling of Stress :    Social Connections:     Frequency of Communication with Friends and Family:     Frequency of Social Gatherings with Friends and Family:     Attends Yarsani Services:     Active Member of Clubs or Organizations:     Attends Club or Organization Meetings:     Marital Status:    Intimate Partner Violence:     Fear of Current or Ex-Partner:     Emotionally Abused:     Physically Abused:     Sexually Abused:        Current Outpatient Medications   Medication Sig Dispense Refill    [START ON 10/14/2021] acetaminophen-codeine (Tylenol-Codeine #3) 300-30 mg per tablet Take 1 Tablet by mouth every six to eight (6-8) hours as needed for Pain for up to 30 days. Max Daily Amount: 4 Tablets.  100 Tablet 2    polyethylene glycol (Miralax) 17 gram/dose powder Take 17 g by mouth daily as needed for Constipation. 510 g 5    diclofenac (VOLTAREN) 1 % gel Apply  to affected area four (4) times daily. Apply to right hip 4 times per day as directed 5 Each 3    acetaminophen-codeine (TYLENOL #3) 300-30 mg per tablet TAKE 1 TABLET BY MOUTH 3 TIMES A DAY AS NEEDED FOR PAIN FOR UP TO 30 DAYS (MAXIMUM 3 TABS PER DAY)      allopurinol (ZYLOPRIM) 100 mg tablet Take 100 mg by mouth every other day.  hydrocortisone (ANUSOL-HC) 2.5 % rectal cream Insert  into rectum two (2) times a day. Use as needed as directed (Patient taking differently: Insert 1 Applicator into rectum two (2) times daily as needed for Itching. Use as needed as directed) 90 g 1    butenafine (MENTAX) 1 % topical cream Apply  to affected area as needed.  fluticasone (FLONASE) 50 mcg/actuation nasal spray 2 Sprays by Both Nostrils route daily as needed for Allergies or Rhinitis.  DULoxetine (CYMBALTA) 30 mg capsule Take 30 mg by mouth daily.  NIFEdipine ER (Adalat CC) 60 mg ER tablet Take 60 mg by mouth daily.  spironolactone (ALDACTONE) 25 mg tablet Take 25 mg by mouth two (2) times a day.  topiramate (Topamax) 100 mg tablet Take 100 mg by mouth two (2) times a day. takes 100 mg in AM , 200 mg every PM for a total of 300 mg per day  Indications: muscle tremors         Allergies   Allergen Reactions    Iodinated Contrast Media Itching    Iodine Hives     Other reaction(s): Unknown         REVIEW OF SYSTEMS    Constitutional: Negative for fever, chills, or weight change. Respiratory: Negative for cough or shortness of breath. Cardiovascular: Negative for chest pain or palpitations. Gastrointestinal: Negative for acid reflux, change in bowel habits, or constipation. Genitourinary: Negative for dysuria and flank pain. Musculoskeletal: Positive for lumbar pain. Skin: Negative for rash. Neurological: Negative for headaches, dizziness, or numbness. Endo/Heme/Allergies: Negative for increased bruising. Psychiatric/Behavioral: Negative for difficulty with sleep. As per HPI    PHYSICAL EXAMINATION  Visit Vitals  /76 (BP 1 Location: Right arm, BP Patient Position: Sitting)   Pulse 66   Temp 97.1 °F (36.2 °C) (Temporal)   Resp 20   Ht 5' 10\" (1.778 m)   Wt 215 lb (97.5 kg)   SpO2 99%   BMI 30.85 kg/m²       Constitutional: Awake, alert, and in no acute distress. Neurological: 1+ symmetrical DTRs in the upper extremities. 1+ symmetrical DTRs in the lower extremities. Sensation to light touch is intact. Negative Malloy's sign bilaterally. Skin: warm, dry, and intact. Musculoskeletal: Tenderness to palpation in the lower lumbar region. Moderate pain with extension and axial loading. Moderate pain with internal rotation of the left leg. Negative straight leg raise bilaterally. Patient ambulates with the assistance of a rolling walker. Biceps  Triceps Deltoids Wrist Ext Wrist Flex Hand Intrin   Right +4/5 +4/5 +4/5 +4/5 +4/5 +4/5   Left +4/5 +4/5 +4/5 +4/5 +4/5 +4/5      Hip Flex  Quads Hamstrings Ankle DF EHL Ankle PF   Right  4/5  4/5  4/5 +4/5 +4/5 +4/5   Left  4/5  4/5  4/5 +4/5 +4/5 +4/5     IMAGING:    Lumbar spine MRI from 07/24/2020 were personally reviewed with the patient and demonstrated:          Results from [place] encounter on 07/24/20   MRI LUMB SPINE W WO CONT     Narrative Sagittal and axial multisequence MR images of lumbar spine were obtained without  and with 20 cc Dotarem gadolinium IV contrast     HISTORY: Back pain and left sciatica.     COMPARISON: August 6, 2010     Loss of lumbar lordosis, similar to before. Trace retrolisthesis L5.     No focal compression fracture. Patchy fat signal in the bone marrow with no  pathologic marrow signal abnormalities. No abnormal enhancement. Conus  medullaris ends at L1 with normal morphology and signal intensity.     Multiple large left renal cysts.     L1-L2: Posterior lateral corner disc protrusion. No central stenosis.  Mild facet  and ligamentous hypertrophy. Mild foraminal narrowing with no nerve root  compression.     L2-L3: Posterior disc bulge and small left paracentral disc protrusion. No  significant central stenosis.  Facet and ligamentous hypertrophy. Mild foraminal  narrowing with no nerve root compression.     L3-L4: Mild posterior disc bulge. Facet and ligamentous hypertrophy. Posterior  epidural fat present. AP canal measures 9.9 mm  No significant central stenosis. Mild foraminal narrowing with no nerve root compression.     L4-L5: Mild posterior disc bulge with left lateral disc protrusion/posterior  lateral annular tear. Facet and ligamentous hypertrophy. No significant central  stenosis. Mild to moderate foraminal stenosis but no definite exiting nerve root  compression. Possible mild arachnoiditis     L5-S1: Left laminotomy. Mild posterior disc bulge and endplate spondylosis,  slightly worse on the left. No central stenosis. Right facet and ligamentous  hypertrophy. Moderate right foraminal stenosis with compression of the right  exiting L5 nerve root. Mild-to-moderate left foraminal stenosis, mild  compression of left exiting L5 nerve root also possible.        Impression IMPRESSION: Degenerative disease worst at L4-L5 and L5-S1, with more severe  foraminal stenosis and nerve root compression at L5-S1. Right slightly worse  than left. However, patient is more symptomatic on the left.        Written by Leigh Howell, as dictated by Meri Diehl MD.  I, Dr. Meri Diehl confirm that all documentation is accurate.

## 2021-10-11 NOTE — PATIENT INSTRUCTIONS
Low Back Arthritis: Exercises  Introduction  Here are some examples of typical rehabilitation exercises for your condition. Start each exercise slowly. Ease off the exercise if you start to have pain. Your doctor or physical therapist will tell you when you can start these exercises and which ones will work best for you. When you are not being active, find a comfortable position for rest. Some people are comfortable on the floor or a medium-firm bed with a small pillow under their head and another under their knees. Some people prefer to lie on their side with a pillow between their knees. Don't stay in one position for too long. Take short walks (10 to 20 minutes) every 2 to 3 hours. Avoid slopes, hills, and stairs until you feel better. Walk only distances you can manage without pain, especially leg pain. How to do the exercises  Pelvic tilt    1. Lie on your back with your knees bent. 2. \"Brace\" your stomach--tighten your muscles by pulling in and imagining your belly button moving toward your spine. 3. Press your lower back into the floor. You should feel your hips and pelvis rock back. 4. Hold for 6 seconds while breathing smoothly. 5. Relax and allow your pelvis and hips to rock forward. 6. Repeat 8 to 12 times. Back stretches    1. Get down on your hands and knees on the floor. 2. Relax your head and allow it to droop. Round your back up toward the ceiling until you feel a nice stretch in your upper, middle, and lower back. Hold this stretch for as long as it feels comfortable, or about 15 to 30 seconds. 3. Return to the starting position with a flat back while you are on your hands and knees. 4. Let your back sway by pressing your stomach toward the floor. Lift your buttocks toward the ceiling. 5. Hold this position for 15 to 30 seconds. 6. Repeat 2 to 4 times. Follow-up care is a key part of your treatment and safety.  Be sure to make and go to all appointments, and call your doctor if you are having problems. It's also a good idea to know your test results and keep a list of the medicines you take. Where can you learn more? Go to http://www.MiArch.com/  Enter T094 in the search box to learn more about \"Low Back Arthritis: Exercises. \"  Current as of: July 1, 2021               Content Version: 13.0  © 5790-5011 [employer]. Care instructions adapted under license by [employer] (which disclaims liability or warranty for this information). If you have questions about a medical condition or this instruction, always ask your healthcare professional. [place] [address] any warranty or liability for your use of this information.

## 2021-10-12 RX ORDER — ACETAMINOPHEN AND CODEINE PHOSPHATE 300; 30 MG/1; MG/1
1 TABLET ORAL
Qty: 100 TABLET | Refills: 2 | Status: SHIPPED | OUTPATIENT
Start: 2021-10-14 | End: 2021-11-13

## 2021-10-12 RX ORDER — POLYETHYLENE GLYCOL 3350 17 G/17G
17 POWDER, FOR SOLUTION ORAL
Qty: 510 G | Refills: 5 | Status: SHIPPED | OUTPATIENT
Start: 2021-10-12 | End: 2022-04-11 | Stop reason: SDUPTHER

## 2021-10-12 RX ORDER — DICLOFENAC SODIUM 10 MG/G
GEL TOPICAL 4 TIMES DAILY
Qty: 5 EACH | Refills: 3 | Status: SHIPPED | OUTPATIENT
Start: 2021-10-12

## 2021-10-13 ENCOUNTER — HOSPITAL ENCOUNTER (OUTPATIENT)
Dept: RADIATION THERAPY | Age: 76
Discharge: HOME | End: 2021-10-13
Payer: MEDICARE

## 2021-10-13 PROCEDURE — 99211 OFF/OP EST MAY X REQ PHY/QHP: CPT

## 2021-10-13 PROCEDURE — 99212 OFFICE O/P EST SF 10 MIN: CPT | Performed by: RADIOLOGY

## 2022-01-11 ENCOUNTER — OFFICE VISIT (OUTPATIENT)
Dept: ORTHOPEDIC SURGERY | Age: 77
End: 2022-01-11
Payer: MEDICARE

## 2022-01-11 VITALS
WEIGHT: 220 LBS | BODY MASS INDEX: 31.5 KG/M2 | OXYGEN SATURATION: 97 % | HEIGHT: 70 IN | TEMPERATURE: 98.1 F | HEART RATE: 84 BPM

## 2022-01-11 DIAGNOSIS — K59.03 DRUG INDUCED CONSTIPATION: ICD-10-CM

## 2022-01-11 DIAGNOSIS — R29.898 WEAKNESS OF BOTH LEGS: ICD-10-CM

## 2022-01-11 DIAGNOSIS — R26.9 GAIT ABNORMALITY: ICD-10-CM

## 2022-01-11 DIAGNOSIS — Z79.891 USE OF OPIATES FOR THERAPEUTIC PURPOSES: ICD-10-CM

## 2022-01-11 DIAGNOSIS — M47.816 LUMBAR SPONDYLOSIS: ICD-10-CM

## 2022-01-11 DIAGNOSIS — M48.062 SPINAL STENOSIS OF LUMBAR REGION WITH NEUROGENIC CLAUDICATION: ICD-10-CM

## 2022-01-11 DIAGNOSIS — G89.29 OTHER CHRONIC PAIN: Primary | ICD-10-CM

## 2022-01-11 DIAGNOSIS — H54.8 LEGALLY BLIND: ICD-10-CM

## 2022-01-11 PROCEDURE — G8536 NO DOC ELDER MAL SCRN: HCPCS | Performed by: PHYSICAL MEDICINE & REHABILITATION

## 2022-01-11 PROCEDURE — G8427 DOCREV CUR MEDS BY ELIG CLIN: HCPCS | Performed by: PHYSICAL MEDICINE & REHABILITATION

## 2022-01-11 PROCEDURE — 99214 OFFICE O/P EST MOD 30 MIN: CPT | Performed by: PHYSICAL MEDICINE & REHABILITATION

## 2022-01-11 PROCEDURE — 1101F PT FALLS ASSESS-DOCD LE1/YR: CPT | Performed by: PHYSICAL MEDICINE & REHABILITATION

## 2022-01-11 PROCEDURE — G8432 DEP SCR NOT DOC, RNG: HCPCS | Performed by: PHYSICAL MEDICINE & REHABILITATION

## 2022-01-11 PROCEDURE — G8417 CALC BMI ABV UP PARAM F/U: HCPCS | Performed by: PHYSICAL MEDICINE & REHABILITATION

## 2022-01-11 RX ORDER — ACETAMINOPHEN AND CODEINE PHOSPHATE 300; 30 MG/1; MG/1
1 TABLET ORAL
Qty: 75 TABLET | Refills: 2 | Status: SHIPPED | OUTPATIENT
Start: 2022-01-20 | End: 2022-02-19

## 2022-01-11 RX ORDER — ACETAMINOPHEN AND CODEINE PHOSPHATE 300; 30 MG/1; MG/1
TABLET ORAL
COMMUNITY
Start: 2021-01-04 | End: 2022-01-11 | Stop reason: SDUPTHER

## 2022-01-11 RX ORDER — INFLUENZA A VIRUS A/MICHIGAN/45/2015 X-275 (H1N1) ANTIGEN (FORMALDEHYDE INACTIVATED), INFLUENZA A VIRUS A/SINGAPORE/INFIMH-16-0019/2016 IVR-186 (H3N2) ANTIGEN (FORMALDEHYDE INACTIVATED), INFLUENZA B VIRUS B/PHUKET/3073/2013 ANTIGEN (FORMALDEHYDE INACTIVATED), AND INFLUENZA B VIRUS B/MARYLAND/15/2016 BX-69A ANTIGEN (FORMALDEHYDE INACTIVATED) 60; 60; 60; 60 UG/.7ML; UG/.7ML; UG/.7ML; UG/.7ML
INJECTION, SUSPENSION INTRAMUSCULAR
COMMUNITY
Start: 2020-10-28 | End: 2022-04-04

## 2022-01-11 RX ORDER — ZOSTER VACCINE RECOMBINANT, ADJUVANTED 50 MCG/0.5
KIT INTRAMUSCULAR
COMMUNITY
Start: 2020-11-11 | End: 2022-04-04

## 2022-01-11 RX ORDER — TETANUS TOXOID, REDUCED DIPHTHERIA TOXOID AND ACELLULAR PERTUSSIS VACCINE, ADSORBED 5; 2.5; 8; 8; 2.5 [IU]/.5ML; [IU]/.5ML; UG/.5ML; UG/.5ML; UG/.5ML
SUSPENSION INTRAMUSCULAR
COMMUNITY
Start: 2020-10-28 | End: 2022-04-04

## 2022-01-11 NOTE — PROGRESS NOTES
Urvashi Coley presents today for   Chief Complaint   Patient presents with    Back Pain       Is someone accompanying this pt? yes    Is the patient using any DME equipment during OV? yes    Depression Screening:  3 most recent PHQ Screens 4/5/2021   PHQ Not Done -   Little interest or pleasure in doing things Not at all   Feeling down, depressed, irritable, or hopeless Not at all   Total Score PHQ 2 0       Learning Assessment:  Learning Assessment 4/21/2017   PRIMARY LEARNER Patient   PRIMARY LANGUAGE ENGLISH   LEARNER PREFERENCE PRIMARY LISTENING   ANSWERED BY patient   RELATIONSHIP SELF       Abuse Screening:  Abuse Screening Questionnaire 9/23/2020   Do you ever feel afraid of your partner? N   Are you in a relationship with someone who physically or mentally threatens you? N   Is it safe for you to go home? Y       Fall Risk  Fall Risk Assessment, last 12 mths 4/5/2021   Able to walk? Yes   Fall in past 12 months? 0   Do you feel unsteady? 0   Are you worried about falling 0   Number of falls in past 12 months -   Fall with injury? -       OPIOID RISK TOOL  Opioid Risk Tool 9/23/2020 3/12/2018   Personal history of alcohol abuse? 0 0   Personal history of illegal drug abuse? 0 0   Personal history of prescription drug abuse? 0 0   Age range between 16-45? 0 0   History of preadolescent sexual abuse? 0 0   ADD, OCD, bipolar, schizophrenia? 0 0   Depression? 0 0   Family history of alcohol abuse? 0 0   Family history of illegal drug abuse? 0 0   Family history of prescription drug abuse? 0 0   Opioid Risk Tool Total Score 0 0       Coordination of Care:  1. Have you been to the ER, urgent care clinic since your last visit? no  Hospitalized since your last visit? no    2. Have you seen or consulted any other health care providers outside of the [address] [place] [address] since your last visit? no Include any pap smears or colon screening.  no

## 2022-01-11 NOTE — PATIENT INSTRUCTIONS
Low Back Arthritis: Exercises  Introduction  Here are some examples of typical rehabilitation exercises for your condition. Start each exercise slowly. Ease off the exercise if you start to have pain. Your doctor or physical therapist will tell you when you can start these exercises and which ones will work best for you. When you are not being active, find a comfortable position for rest. Some people are comfortable on the floor or a medium-firm bed with a small pillow under their head and another under their knees. Some people prefer to lie on their side with a pillow between their knees. Don't stay in one position for too long. Take short walks (10 to 20 minutes) every 2 to 3 hours. Avoid slopes, hills, and stairs until you feel better. Walk only distances you can manage without pain, especially leg pain. How to do the exercises  Pelvic tilt    1. Lie on your back with your knees bent. 2. \"Brace\" your stomachtighten your muscles by pulling in and imagining your belly button moving toward your spine. 3. Press your lower back into the floor. You should feel your hips and pelvis rock back. 4. Hold for 6 seconds while breathing smoothly. 5. Relax and allow your pelvis and hips to rock forward. 6. Repeat 8 to 12 times. Back stretches    1. Get down on your hands and knees on the floor. 2. Relax your head and allow it to droop. Round your back up toward the ceiling until you feel a nice stretch in your upper, middle, and lower back. Hold this stretch for as long as it feels comfortable, or about 15 to 30 seconds. 3. Return to the starting position with a flat back while you are on your hands and knees. 4. Let your back sway by pressing your stomach toward the floor. Lift your buttocks toward the ceiling. 5. Hold this position for 15 to 30 seconds. 6. Repeat 2 to 4 times. Follow-up care is a key part of your treatment and safety.  Be sure to make and go to all appointments, and call your doctor if you are having problems. It's also a good idea to know your test results and keep a list of the medicines you take. Where can you learn more? Go to http://www.tuQuejaSuma.com/  Enter T094 in the search box to learn more about \"Low Back Arthritis: Exercises. \"  Current as of: July 1, 2021               Content Version: 13.0  © 2006-2021 [employer]. Care instructions adapted under license by [employer] (which disclaims liability or warranty for this information). If you have questions about a medical condition or this instruction, always ask your healthcare professional. [address] any warranty or liability for your use of this information. Learning About Sleeping Well  What does sleeping well mean? Sleeping well means getting enough sleep. How much sleep is enough varies among people. The number of hours you sleep is not as important as how you feel when you wake up. If you do not feel refreshed, you probably need more sleep. Another sign of not getting enough sleep is feeling tired during the day. The average total nightly sleep time is 7½ to 8 hours. Healthy adults may need a little more or a little less than this. Why is getting enough sleep important? Getting enough quality sleep is a basic part of good health. When your sleep suffers, your mood and your thoughts can suffer too. You may find yourself feeling more grumpy or stressed. Not getting enough sleep also can lead to serious problems, including injury, accidents, anxiety, and depression. What might cause poor sleeping? Many things can cause sleep problems, including:  · Stress. Stress can be caused by fear about a single event, such as giving a speech. Or you may have ongoing stress, such as worry about work or school. · Depression, anxiety, and other mental or emotional conditions. · Changes in your sleep habits or surroundings.  This includes changes that happen where you sleep, such as noise, light, or sleeping in a different bed. It also includes changes in your sleep pattern, such as having jet lag or working a late shift. · Health problems, such as pain, breathing problems, and restless legs syndrome. · Lack of regular exercise. How can you help yourself? Here are some tips that may help you sleep more soundly and wake up feeling more refreshed. Your sleeping area   · Use your bedroom only for sleeping and sex. A bit of light reading may help you fall asleep. But if it doesn't, do your reading elsewhere in the house. Don't watch TV in bed. · Be sure your bed is big enough to stretch out comfortably, especially if you have a sleep partner. · Keep your bedroom quiet, dark, and cool. Use curtains, blinds, or a sleep mask to block out light. To block out noise, use earplugs, soothing music, or a \"white noise\" machine. Your evening and bedtime routine   · Create a relaxing bedtime routine. You might want to take a warm shower or bath, listen to soothing music, or drink a cup of noncaffeinated tea. · Go to bed at the same time every night. And get up at the same time every morning, even if you feel tired. What to avoid   · Limit caffeine (coffee, tea, caffeinated sodas) during the day, and don't have any for at least 4 to 6 hours before bedtime. · Don't drink alcohol before bedtime. Alcohol can cause you to wake up more often during the night. · Don't smoke or use tobacco, especially in the evening. Nicotine can keep you awake. · Don't take naps during the day, especially close to bedtime. · Don't lie in bed awake for too long. If you can't fall asleep, or if you wake up in the middle of the night and can't get back to sleep within 15 minutes or so, get out of bed and go to another room until you feel sleepy. · Don't take medicine right before bed that may keep you awake or make you feel hyper or energized.  Your doctor can tell you if your medicine may do this and if you can take it earlier in the day. If you can't sleep   · Imagine yourself in a peaceful, pleasant scene. Focus on the details and feelings of being in a place that is relaxing. · Get up and do a quiet or boring activity until you feel sleepy. · Don't drink any liquids after 6 p.m. if you wake up often because you have to go to the bathroom. Where can you learn more? Go to http://www.gray.com/  Enter B205 in the search box to learn more about \"Learning About Sleeping Well. \"  Current as of: June 16, 2021               Content Version: 13.0  © 3761-0675 [employer]. Care instructions adapted under license by [employer] (which disclaims liability or warranty for this information). If you have questions about a medical condition or this instruction, always ask your healthcare professional. [address] any warranty or liability for your use of this information.

## 2022-01-11 NOTE — PROGRESS NOTES
[facility]  Denise Hanna., [address] [place] [address], [facility] [address]  Phone: [phone]  Fax: [phone]    Pt's YOB: 1945    ASSESSMENT   Diagnoses and all orders for this visit:    1. Other chronic pain  -     acetaminophen-codeine (TYLENOL #3) 300-30 mg per tablet; Take 1 Tablet by mouth three (3) times daily as needed for Pain for up to 30 days. Max Daily Amount: 3 Tablets. Take 1 tab by mouth 3 times daily as needed for pain. 2. Use of opiates for therapeutic purposes  -     acetaminophen-codeine (TYLENOL #3) 300-30 mg per tablet; Take 1 Tablet by mouth three (3) times daily as needed for Pain for up to 30 days. Max Daily Amount: 3 Tablets. Take 1 tab by mouth 3 times daily as needed for pain. 3. Lumbar spondylosis    4. Spinal stenosis of lumbar region with neurogenic claudication    5. Gait abnormality    6. Weakness of both legs    7. Drug induced constipation    8. Legally blind         IMPRESSION AND PLAN:  Sincere Pitt is a 64 y.o. male with history of chronic lumbar pain. He complains of continued lumbar pain but notes no change in his symptoms since his last office visit. Pt is taking Tylenol #3 1 tab 4 x daily as his pain warrants, Cymbalta 30 mg 1 cap daily, and Topamax 100 mg 1 tab QAM and 2 tabs QHS prescribed by Dr. Chantell Caldwell and uses Voltaren gel with some benefit. 1) Pt was given information on lumbar arthritis exercises. 2) Discussed medication treatment options with the patient. I recommended OTC Tylenol and advised pt that Tylenol #3 is prescribed as needed for pain and pt can decrease his dosage at any time. 3) He received refills of Tylenol #3 for chronic pain. 4) I encouraged the patient to exercise regularly, working up to 30 minutes a day, 5 days a week. 5) I advised the pt to minimize midday naps and establish regular bedtimes and waking times and a bedtime routine.  Pt was given information about healthy sleep. 6) Pt will discuss his dosage of Topamax with Dr. Zoe Anderson at his next follow-up. 7) Mr. Susanna Reeves has a reminder for a \"due or due soon\" health maintenance. I have asked that he contact his primary care provider, None, for follow-up on this health maintenance. 8)  demonstrated consistency with prescribing. 9) Last UDS from 10/19/2021 was consistent. Follow-up and Dispositions    · Return in about 3 months (around 4/11/2022) for Medication follow up. HISTORY OF PRESENT ILLNESS:  Taya Bernal is a 64 y.o. male with history of chronic lumbar pain and presents to the office today for follow up. Pt complains of continued lumbar pain but notes no change in his symptoms since his last office visit. He denies any difficulty with mobility or increased weakness. Pt confirms that he underwent one session of home physical therapy and was told that he no longer needed physical therapy. He states that he does light exercises in the mornings upon waking. Pt is taking Tylenol #3 1 tab 3-4 x daily as his pain warrants with sedation, Cymbalta 30 mg 1 cap daily, and Topamax 100 mg 1 tab QAM and 2 tabs QHS prescribed by Dr. Zoe Anderson and uses Voltaren gel. He states that the Tylenol #3 does not alter his pain level and he would like to decrease his dosage due to the sedation. He also notes that his dosage of Topamax feels too strong. Pt denies taking anticoagulants. He states that he will no longer take allopurinol once his current prescription runs out due to declining kidney function. He mentions that he is now followed by Dr. Mikala Khan at [facility]. Labs from 10/07/2021 were reviewed and demonstrated mildly low testosterone and a PSA of 0.1. Labs from 07/2020 revealed a normal GFR and a creatinine of 1.3 mg/dL. He denies any history of diabetes mellitus. Pt at this time desires to continue with current care. Of note, pt presents to today's office visit ambulating with the assistance of a rolling walker.  He is accompanied to today's office visit by his son. 30 minutes was spent with pt and his son discussing his symptoms, reviewing his medications, increasing his activity and trying to develop HEP, reviewing his MRI , follow up with PCP and discussing other possibilities for his pain. Pain Scale: 6/10    PCP: None     Past Medical History:   Diagnosis Date    ED (erectile dysfunction)     Essential hypertension     Gout     Hypogonadism in male     Hypotestosteronism     Ill-defined condition     ESSENTIAL TREMORS    Legally blind     Nocturia     Nodular prostate without urinary obstruction     Prostate cancer ([facility] [address] 2/14/13    PNBx-T2a - Prostatic Volume: 26.7 grams - [place] 6 (3 + 3) -  Dr. Barrington Dawson    Retinitis pigmentosa of both eyes     Seasonal allergic reaction     Thyroid disease     Nodule    Tremors of nervous system         Social History     Socioeconomic History    Marital status:      Spouse name: Not on file    Number of children: Not on file    Years of education: Not on file    Highest education level: Not on file   Occupational History    Not on file   Tobacco Use    Smoking status: Never Smoker    Smokeless tobacco: Never Used   Vaping Use    Vaping Use: Never used   Substance and Sexual Activity    Alcohol use: Not Currently     Comment: seldom    Drug use: Never    Sexual activity: Yes   Other Topics Concern    Not on file   Social History Narrative    Not on file     Social Determinants of Health     Financial Resource Strain:     Difficulty of Paying Living Expenses: Not on file   Food Insecurity:     Worried About [address] in the Last Year: Not on file    [address] in the Last Year: Not on file   Transportation Needs:     Lack of Transportation (Medical): Not on file    Lack of Transportation (Non-Medical):  Not on file   Physical Activity:     Days of Exercise per Week: Not on file    Minutes of Exercise per Session: Not on file Stress:     Feeling of Stress : Not on file   Social Connections:     Frequency of Communication with Friends and Family: Not on file    Frequency of Social Gatherings with Friends and Family: Not on file    Attends Sikh Services: Not on file    Active Member of [address] [place] [address] or Organizations: Not on file    Attends Club or Organization Meetings: Not on file    Marital Status: Not on file   Intimate Partner Violence:     Fear of Current or Ex-Partner: Not on file    Emotionally Abused: Not on file    Physically Abused: Not on file    Sexually Abused: Not on file   Housing Stability:     Unable to Pay for Housing in the Last Year: Not on file    Number of [place] in the Last Year: Not on file    Unstable Housing in the Last Year: Not on file       Current Outpatient Medications   Medication Sig Dispense Refill    [START ON 1/20/2022] acetaminophen-codeine (TYLENOL #3) 300-30 mg per tablet Take 1 Tablet by mouth three (3) times daily as needed for Pain for up to 30 days. Max Daily Amount: 3 Tablets. Take 1 tab by mouth 3 times daily as needed for pain. 75 Tablet 2    acetaminophen-codeine (TYLENOL #3) 300-30 mg per tablet TAKE 1 TABLET BY MOUTH 3 TIMES A DAY AS NEEDED FOR PAIN FOR UP TO 30 DAYS (MAXIMUM 3 TABS PER DAY)      allopurinol (ZYLOPRIM) 100 mg tablet Take 100 mg by mouth every other day.  topiramate (Topamax) 100 mg tablet Take 100 mg by mouth two (2) times a day.  takes 100 mg in AM , 200 mg every PM for a total of 300 mg per day  Indications: muscle tremors      diphth,pertus,acell,,tetanus (Boostrix Tdap) 2.5-8-5 Lf-mcg-Lf/0.5mL susp suspension  (Patient not taking: Reported on 1/11/2022)      influenza vaccine 2020-21, 65 yrs+,,PF, (Fluzone HighDose Quad 20-21 PF) syrg injection  (Patient not taking: Reported on 1/11/2022)      varicella-zoster recombinant, PF, (Shingrix, PF,) 50 mcg/0.5 mL susr injection  (Patient not taking: Reported on 1/11/2022)      polyethylene glycol (Miralax) 17 gram/dose powder Take 17 g by mouth daily as needed for Constipation. 510 g 5    diclofenac (VOLTAREN) 1 % gel Apply  to affected area four (4) times daily. Apply to right hip 4 times per day as directed 5 Each 3    hydrocortisone (ANUSOL-HC) 2.5 % rectal cream Insert  into rectum two (2) times a day. Use as needed as directed (Patient taking differently: Insert 1 Applicator into rectum two (2) times daily as needed for Itching. Use as needed as directed) 90 g 1    butenafine (MENTAX) 1 % topical cream Apply  to affected area as needed.  fluticasone (FLONASE) 50 mcg/actuation nasal spray 2 Sprays by Both Nostrils route daily as needed for Allergies or Rhinitis.  DULoxetine (CYMBALTA) 30 mg capsule Take 30 mg by mouth daily.  NIFEdipine ER (Adalat CC) 60 mg ER tablet Take 60 mg by mouth daily.  spironolactone (ALDACTONE) 25 mg tablet Take 25 mg by mouth two (2) times a day. Allergies   Allergen Reactions    Iodinated Contrast Media Itching    Iodine Hives     Other reaction(s): Unknown    Spironolactone Unknown (comments)         REVIEW OF SYSTEMS    Constitutional: Negative for fever, chills, or weight change. Respiratory: Negative for cough or shortness of breath. Cardiovascular: Negative for chest pain or palpitations. Gastrointestinal: Negative for acid reflux, change in bowel habits, or constipation. Genitourinary: Negative for dysuria and flank pain. Musculoskeletal: Positive for lumbar pain. Skin: Negative for rash. Neurological: Negative for headaches, dizziness, or numbness. Endo/Heme/Allergies: Negative for increased bruising. Psychiatric/Behavioral: Positive for difficulty with sleep. As per HPI    PHYSICAL EXAMINATION  Visit Vitals  Pulse 84   Temp 98.1 °F (36.7 °C) (Temporal)   Ht 5' 10\" (1.778 m)   Wt 220 lb (99.8 kg)   SpO2 97%   BMI 31.57 kg/m²       Constitutional: Awake, alert, and in no acute distress.   Neurological: Sensation to light touch is intact. Skin: warm, dry, and intact. Musculoskeletal: No pain with extension, axial loading, or forward flexion. No pain with internal or external rotation of his hips. Negative straight leg raise bilaterally. Biceps  Triceps Deltoids Wrist Ext Wrist Flex Hand Intrin   Right +4/5 +4/5 +4/5 +4/5 +4/5 +4/5   Left +4/5 +4/5 +4/5 +4/5 +4/5 +4/5      Hip Flex  Quads Hamstrings Ankle DF EHL Ankle PF   Right +4/5 -4/5 -4/5 +4/5 +4/5 +4/5   Left +4/5 -4/5 -4/5 +4/5 +4/5 +4/5     IMAGING:    Lumbar spine MRI from 07/24/2020 were personally reviewed with the patient and demonstrated:          Results from [place] encounter on 07/24/20   MRI LUMB SPINE W WO CONT     Narrative Sagittal and axial multisequence MR images of lumbar spine were obtained without  and with 20 cc Dotarem gadolinium IV contrast     HISTORY: Back pain and left sciatica.     COMPARISON: August 6, 2010     Loss of lumbar lordosis, similar to before. Trace retrolisthesis L5.     No focal compression fracture. Patchy fat signal in the bone marrow with no  pathologic marrow signal abnormalities. No abnormal enhancement. Conus  medullaris ends at L1 with normal morphology and signal intensity.     Multiple large left renal cysts.     L1-L2: Posterior lateral corner disc protrusion. No central stenosis. Mild facet  and ligamentous hypertrophy. Mild foraminal narrowing with no nerve root  compression.     L2-L3: Posterior disc bulge and small left paracentral disc protrusion. No  significant central stenosis.  Facet and ligamentous hypertrophy. Mild foraminal  narrowing with no nerve root compression.     L3-L4: Mild posterior disc bulge. Facet and ligamentous hypertrophy. Posterior  epidural fat present. AP canal measures 9.9 mm  No significant central stenosis. Mild foraminal narrowing with no nerve root compression.     L4-L5: Mild posterior disc bulge with left lateral disc protrusion/posterior  lateral annular tear.  Facet and ligamentous hypertrophy. No significant central  stenosis. Mild to moderate foraminal stenosis but no definite exiting nerve root  compression. Possible mild arachnoiditis     L5-S1: Left laminotomy. Mild posterior disc bulge and endplate spondylosis,  slightly worse on the left. No central stenosis. Right facet and ligamentous  hypertrophy. Moderate right foraminal stenosis with compression of the right  exiting L5 nerve root. Mild-to-moderate left foraminal stenosis, mild  compression of left exiting L5 nerve root also possible.        Impression IMPRESSION: Degenerative disease worst at L4-L5 and L5-S1, with more severe  foraminal stenosis and nerve root compression at L5-S1. Right slightly worse  than left. However, patient is more symptomatic on the left. Written by Rachna Ramirez, as dictated by Demarcus Jaeger MD.  I, Dr. Demarcus Jaeger confirm that all documentation is accurate.

## 2022-03-19 PROBLEM — R10.9 ABDOMINAL PAIN: Status: ACTIVE | Noted: 2017-04-21

## 2022-04-11 ENCOUNTER — OFFICE VISIT (OUTPATIENT)
Dept: ORTHOPEDIC SURGERY | Age: 77
End: 2022-04-11
Payer: MEDICARE

## 2022-04-11 VITALS
SYSTOLIC BLOOD PRESSURE: 122 MMHG | OXYGEN SATURATION: 96 % | DIASTOLIC BLOOD PRESSURE: 63 MMHG | BODY MASS INDEX: 30.95 KG/M2 | HEIGHT: 70 IN | WEIGHT: 216.2 LBS | HEART RATE: 76 BPM | TEMPERATURE: 97.8 F

## 2022-04-11 DIAGNOSIS — G89.29 OTHER CHRONIC PAIN: ICD-10-CM

## 2022-04-11 DIAGNOSIS — R26.9 GAIT ABNORMALITY: ICD-10-CM

## 2022-04-11 DIAGNOSIS — R31.9 HEMATURIA, UNSPECIFIED TYPE: ICD-10-CM

## 2022-04-11 DIAGNOSIS — R29.898 WEAKNESS OF BOTH LEGS: ICD-10-CM

## 2022-04-11 DIAGNOSIS — Z79.891 USE OF OPIATES FOR THERAPEUTIC PURPOSES: ICD-10-CM

## 2022-04-11 DIAGNOSIS — B02.29 POST HERPETIC NEURALGIA: ICD-10-CM

## 2022-04-11 DIAGNOSIS — M48.062 SPINAL STENOSIS OF LUMBAR REGION WITH NEUROGENIC CLAUDICATION: ICD-10-CM

## 2022-04-11 DIAGNOSIS — H54.8 LEGALLY BLIND: ICD-10-CM

## 2022-04-11 DIAGNOSIS — K59.03 DRUG INDUCED CONSTIPATION: ICD-10-CM

## 2022-04-11 DIAGNOSIS — Z86.19 HISTORY OF SHINGLES: ICD-10-CM

## 2022-04-11 DIAGNOSIS — M47.816 LUMBAR SPONDYLOSIS: ICD-10-CM

## 2022-04-11 DIAGNOSIS — G89.29 OTHER CHRONIC PAIN: Primary | ICD-10-CM

## 2022-04-11 PROCEDURE — G8536 NO DOC ELDER MAL SCRN: HCPCS | Performed by: PHYSICAL MEDICINE & REHABILITATION

## 2022-04-11 PROCEDURE — G8432 DEP SCR NOT DOC, RNG: HCPCS | Performed by: PHYSICAL MEDICINE & REHABILITATION

## 2022-04-11 PROCEDURE — 99214 OFFICE O/P EST MOD 30 MIN: CPT | Performed by: PHYSICAL MEDICINE & REHABILITATION

## 2022-04-11 PROCEDURE — 1101F PT FALLS ASSESS-DOCD LE1/YR: CPT | Performed by: PHYSICAL MEDICINE & REHABILITATION

## 2022-04-11 PROCEDURE — G8417 CALC BMI ABV UP PARAM F/U: HCPCS | Performed by: PHYSICAL MEDICINE & REHABILITATION

## 2022-04-11 PROCEDURE — G8427 DOCREV CUR MEDS BY ELIG CLIN: HCPCS | Performed by: PHYSICAL MEDICINE & REHABILITATION

## 2022-04-11 RX ORDER — ACETAMINOPHEN AND CODEINE PHOSPHATE 300; 30 MG/1; MG/1
1 TABLET ORAL
Qty: 75 TABLET | Refills: 2 | Status: CANCELLED | OUTPATIENT
Start: 2022-04-11 | End: 2022-05-11

## 2022-04-11 RX ORDER — LIDOCAINE 50 MG/G
PATCH TOPICAL
Qty: 180 PATCH | Refills: 1 | Status: SHIPPED | OUTPATIENT
Start: 2022-04-11

## 2022-04-11 RX ORDER — POLYETHYLENE GLYCOL 3350 17 G/17G
17 POWDER, FOR SOLUTION ORAL
Qty: 510 G | Refills: 5 | Status: SHIPPED | OUTPATIENT
Start: 2022-04-11

## 2022-04-11 NOTE — PROGRESS NOTES
[facility]  [address] [place], [facility] [address]  Phone: [phone]  Fax: [phone]    Pt's YOB: 1945    ASSESSMENT   Diagnoses and all orders for this visit:    1. Other chronic pain  -     DRUG SCREEN UR - W/ CONFIRM; Future    2. Use of opiates for therapeutic purposes  -     DRUG SCREEN UR - W/ CONFIRM; Future    3. Drug induced constipation  -     polyethylene glycol (Miralax) 17 gram/dose powder; Take 17 g by mouth daily as needed for Constipation. 4. History of shingles  -     lidocaine (LIDODERM) 5 %; Apply patch to the affected area for 12 hours a day and remove for 12 hours a day. 5. Gait abnormality    6. Lumbar spondylosis    7. Weakness of both legs    8. Post herpetic neuralgia  -     lidocaine (LIDODERM) 5 %; Apply patch to the affected area for 12 hours a day and remove for 12 hours a day. 9. Spinal stenosis of lumbar region with neurogenic claudication    10. Legally blind    11. Hematuria, unspecified type         IMPRESSION AND PLAN:  Pam Amado is a 64 y.o. male with history of chronic lumbar pain. He complains of continued lumbar pain with prolonged standing, progressive throughout the day, radiating down the left lower extremity to the knee and left lower extremity weakness but reports some improvement since his last office visit and notes he has reduced his dosage of Tylenol #3. Pt is taking Tylenol #3 1 tab 2-3 x daily as his pain warrants with constipation, Cymbalta 30 mg 1 cap daily, and Topamax 100 mg 1 tab QAM and 2 tabs QHS prescribed by Dr. Mark Robertson. 1) Pt was given information on lumbar arthritis exercises. 2) Pt was prescribed lidocaine 5% patches, 1-2 patches daily (12 hours on, 12 hours off). 3) Pt received refills of Miralax 17 g daily as needed for constipation. 4) Pt will continue to take Tylenol #3 1 tab 2-3 x daily as needed and does not require a refill at this time.  Pt will contact the office if a refill is needed. 5) Pt is not currently a candidate for NSAID's due to recent history of hematuria and renal disease. 6) Pt will proceed with the MRI urogram currently scheduled. 7) A UDS was obtained at today's office visit. 8) Pt will continue with Cymbalta 30 mg and Topamax 100 mg (from Dr Jc Hernández)  9) Mr. Huey Sandoval has a reminder for a \"due or due soon\" health maintenance. I have asked that he contact his primary care provider, None, for follow-up on this health maintenance. 10)  demonstrated consistency with prescribing. 11) May also consider Topamax for cause for bleeding if no other cause identified   6) Last UDS from 10/11/2021 was consistent. Follow-up and Dispositions    · Return in about 3 months (around 7/11/2022) for Medication follow up. HISTORY OF PRESENT ILLNESS:  Colby Aleman is a 64 y.o. male with history of chronic lumbar pain and presents to the office today for medication follow up, accompanied by his grandson. Pt complains of continued lumbar pain with prolonged standing, progressive throughout the day, radiating down the left lower extremity to the knee and left lower extremity weakness but reports some improvement since his last office visit and notes he has reduced his dosage of Tylenol #3. He denies any pain with lying in bed. Pt is taking Tylenol #3 1 tab 2-3 x daily as his pain warrants with constipation, Cymbalta 30 mg 1 cap daily, and Topamax 100 mg 1 tab QAM and 2 tabs QHS prescribed by Dr. Jc Hernández. He denies taking any anticoagulants. Pt states that he is scheduled for an MRI urogram with contrast on 04/22/2021 secondary to hematuria noticed by his grandson. Pt's grandson states that pt also experienced hematochezia while he was previously taking testosterone. Pt further noted difficulty sleeping due to nocturia secondary to taking testosterone. Labs from 02/04/2022 were reviewed and demonstrated a PSA of 0.06 ng/mL.  He notes that he previously underwent a lumbar surgery. He also notes a history of shingles. Pt at this time desires to proceed with medication evaluation. Of note, pt presents to today's office visit ambulating with the assistance of a rolling walker with seat. He notes that he has received the booster dose of the COVID-19 vaccine. Pain Scale: 5/10    PCP: None     Past Medical History:   Diagnosis Date    ED (erectile dysfunction)     Essential hypertension     Gout     Hypogonadism in male     Hypotestosteronism     Ill-defined condition     ESSENTIAL TREMORS    Legally blind     Nocturia     Nodular prostate without urinary obstruction     Prostate cancer ([facility] [address] 2/14/13    PNBx-T2a - Prostatic Volume: 26.7 grams - Jeffery 6 (3 + 3) -  Dr. Nnamdi Busby    Retinitis pigmentosa of both eyes     Seasonal allergic reaction     Thyroid disease     Nodule    Tremors of nervous system         Social History     Socioeconomic History    Marital status:      Spouse name: Not on file    Number of children: Not on file    Years of education: Not on file    Highest education level: Not on file   Occupational History    Not on file   Tobacco Use    Smoking status: Never Smoker    Smokeless tobacco: Never Used   Vaping Use    Vaping Use: Never used   Substance and Sexual Activity    Alcohol use: Not Currently     Comment: seldom    Drug use: Never    Sexual activity: Yes   Other Topics Concern    Not on file   Social History Narrative    Not on file     Social Determinants of Health     Financial Resource Strain:     Difficulty of Paying Living Expenses: Not on file   Food Insecurity:     Worried About [address] in the Last Year: Not on file    [address] in the Last Year: Not on file   Transportation Needs:     Lack of Transportation (Medical): Not on file    Lack of Transportation (Non-Medical):  Not on file   Physical Activity:     Days of Exercise per Week: Not on file    Minutes of Exercise per Session: Not on file   Stress:     Feeling of Stress : Not on file   Social Connections:     Frequency of Communication with Friends and Family: Not on file    Frequency of Social Gatherings with Friends and Family: Not on file    Attends Faith Services: Not on file    Active Member of [address] [place] [address] or Organizations: Not on file    Attends Club or Organization Meetings: Not on file    Marital Status: Not on file   Intimate Partner Violence:     Fear of Current or Ex-Partner: Not on file    Emotionally Abused: Not on file    Physically Abused: Not on file    Sexually Abused: Not on file   Housing Stability:     Unable to Pay for Housing in the Last Year: Not on file    Number of [place] in the Last Year: Not on file    Unstable Housing in the Last Year: Not on file       Current Outpatient Medications   Medication Sig Dispense Refill    lidocaine (LIDODERM) 5 % Apply patch to the affected area for 12 hours a day and remove for 12 hours a day. 180 Patch 1    polyethylene glycol (Miralax) 17 gram/dose powder Take 17 g by mouth daily as needed for Constipation. 510 g 5    testosterone (ANDROGEL) 20.25 mg/1.25 gram (1.62 %) gel Apply 1 pump daily to inner thigh. 1 Each 1    alprostadiL (River) 500 mcg supp 500 mcg by IntraURETHral route daily as needed for PRN Reason (Other). Indications: the inability to have an erection 15 Suppository 1    diclofenac (VOLTAREN) 1 % gel Apply  to affected area four (4) times daily. Apply to right hip 4 times per day as directed 5 Each 3    acetaminophen-codeine (TYLENOL #3) 300-30 mg per tablet TAKE 1 TABLET BY MOUTH 3 TIMES A DAY AS NEEDED FOR PAIN FOR UP TO 30 DAYS (MAXIMUM 3 TABS PER DAY)      allopurinol (ZYLOPRIM) 100 mg tablet Take 100 mg by mouth every other day.  hydrocortisone (ANUSOL-HC) 2.5 % rectal cream Insert  into rectum two (2) times a day.  Use as needed as directed (Patient taking differently: Insert 1 Applicator into rectum two (2) times daily as needed for Itching. Use as needed as directed) 90 g 1    butenafine (MENTAX) 1 % topical cream Apply  to affected area as needed.  fluticasone (FLONASE) 50 mcg/actuation nasal spray 2 Sprays by Both Nostrils route daily as needed for Allergies or Rhinitis.  DULoxetine (CYMBALTA) 30 mg capsule Take 30 mg by mouth daily.  NIFEdipine ER (Adalat CC) 60 mg ER tablet Take 60 mg by mouth daily.  spironolactone (ALDACTONE) 25 mg tablet Take 25 mg by mouth two (2) times a day.  topiramate (Topamax) 100 mg tablet Take 100 mg by mouth two (2) times a day. takes 100 mg in AM , 200 mg every PM for a total of 300 mg per day  Indications: muscle tremors         Allergies   Allergen Reactions    Iodinated Contrast Media Itching    Iodine Hives     Other reaction(s): Unknown    Spironolactone Unknown (comments)         REVIEW OF SYSTEMS    Constitutional: Negative for fever, chills, or weight change. Respiratory: Negative for cough or shortness of breath. Cardiovascular: Negative for chest pain or palpitations. Gastrointestinal: Negative for acid reflux, change in bowel habits, or constipation. Genitourinary: Negative for dysuria and flank pain. Musculoskeletal: Positive for lumbar and left thigh pain. Positive for left lower extremity weakness. Skin: Negative for rash. Neurological: Negative for headaches, dizziness, or numbness. Endo/Heme/Allergies: Negative for increased bruising. Psychiatric/Behavioral: Negative for difficulty with sleep. As per HPI    PHYSICAL EXAMINATION  Visit Vitals  /63 (BP 1 Location: Right upper arm, BP Patient Position: Sitting, BP Cuff Size: Large adult)   Pulse 76   Temp 97.8 °F (36.6 °C) (Oral)   Ht 5' 10\" (1.778 m)   Wt 216 lb 3.2 oz (98.1 kg)   SpO2 96%   BMI 31.02 kg/m²       Constitutional: Awake, alert, and in no acute distress. Neurological:  Sensation to light touch is intact. Skin: warm, dry, and intact. Musculoskeletal: Tenderness to palpation over the lumbar region. No pain with extension, axial loading, or forward flexion. No pain with internal or external rotation of his hips. Negative straight leg raise bilaterally. Pt ambulates with the assistance of rolling walker with seat     Hip Flex  Quads Hamstrings Ankle DF EHL Ankle PF   Right 4/5 4/5 4/5 4/5 4/5 4/5   Left 4/5 4/5 4/5 4/5 4/5 4/5     IMAGING:      Lumbar spine MRI from 07/24/2020 were personally reviewed with the patient and demonstrated:          Results from [place] encounter on 07/24/20   MRI LUMB SPINE W WO CONT     Narrative Sagittal and axial multisequence MR images of lumbar spine were obtained without  and with 20 cc Dotarem gadolinium IV contrast     HISTORY: Back pain and left sciatica.     COMPARISON: August 6, 2010     Loss of lumbar lordosis, similar to before. Trace retrolisthesis L5.     No focal compression fracture. Patchy fat signal in the bone marrow with no  pathologic marrow signal abnormalities. No abnormal enhancement. Conus  medullaris ends at L1 with normal morphology and signal intensity.     Multiple large left renal cysts.     L1-L2: Posterior lateral corner disc protrusion. No central stenosis. Mild facet  and ligamentous hypertrophy. Mild foraminal narrowing with no nerve root  compression.     L2-L3: Posterior disc bulge and small left paracentral disc protrusion. No  significant central stenosis.  Facet and ligamentous hypertrophy. Mild foraminal  narrowing with no nerve root compression.     L3-L4: Mild posterior disc bulge. Facet and ligamentous hypertrophy. Posterior  epidural fat present. AP canal measures 9.9 mm  No significant central stenosis. Mild foraminal narrowing with no nerve root compression.     L4-L5: Mild posterior disc bulge with left lateral disc protrusion/posterior  lateral annular tear. Facet and ligamentous hypertrophy. No significant central  stenosis.  Mild to moderate foraminal stenosis but no definite exiting nerve root  compression. Possible mild arachnoiditis     L5-S1: Left laminotomy. Mild posterior disc bulge and endplate spondylosis,  slightly worse on the left. No central stenosis. Right facet and ligamentous  hypertrophy. Moderate right foraminal stenosis with compression of the right  exiting L5 nerve root. Mild-to-moderate left foraminal stenosis, mild  compression of left exiting L5 nerve root also possible.        Impression IMPRESSION: Degenerative disease worst at L4-L5 and L5-S1, with more severe  foraminal stenosis and nerve root compression at L5-S1. Right slightly worse  than left. However, patient is more symptomatic on the left.        Written by Celso Dandy, as dictated by Nikko Petty MD.  I, Dr. Nikko Petty confirm that all documentation is accurate.

## 2022-04-11 NOTE — PROGRESS NOTES
Jessica Torres presents today for   Chief Complaint   Patient presents with    Leg Pain     left    Back Pain       Is someone accompanying this pt? yes    Is the patient using any DME equipment during OV? yes    Depression Screening:  3 most recent PHQ Screens 4/5/2021   PHQ Not Done -   Little interest or pleasure in doing things Not at all   Feeling down, depressed, irritable, or hopeless Not at all   Total Score PHQ 2 0       Learning Assessment:  Learning Assessment 4/21/2017   PRIMARY LEARNER Patient   PRIMARY LANGUAGE ENGLISH   LEARNER PREFERENCE PRIMARY LISTENING   ANSWERED BY patient   RELATIONSHIP SELF       Abuse Screening:  Abuse Screening Questionnaire 9/23/2020   Do you ever feel afraid of your partner? N   Are you in a relationship with someone who physically or mentally threatens you? N   Is it safe for you to go home? Y       Fall Risk  Fall Risk Assessment, last 12 mths 4/5/2021   Able to walk? Yes   Fall in past 12 months? 0   Do you feel unsteady? 0   Are you worried about falling 0   Number of falls in past 12 months -   Fall with injury? -       OPIOID RISK TOOL  Opioid Risk Tool 9/23/2020 3/12/2018   Personal history of alcohol abuse? 0 0   Personal history of illegal drug abuse? 0 0   Personal history of prescription drug abuse? 0 0   Age range between 16-45? 0 0   History of preadolescent sexual abuse? 0 0   ADD, OCD, bipolar, schizophrenia? 0 0   Depression? 0 0   Family history of alcohol abuse? 0 0   Family history of illegal drug abuse? 0 0   Family history of prescription drug abuse? 0 0   Opioid Risk Tool Total Score 0 0       Coordination of Care:  1. Have you been to the ER, urgent care clinic since your last visit? no  Hospitalized since your last visit? Yes, urology, PCP    2. Have you seen or consulted any other health care providers outside of the [address] [place] [address] since your last visit? no Include any pap smears or colon screening.  no

## 2022-04-11 NOTE — PATIENT INSTRUCTIONS
Low Back Arthritis: Exercises  Introduction  Here are some examples of typical rehabilitation exercises for your condition. Start each exercise slowly. Ease off the exercise if you start to have pain. Your doctor or physical therapist will tell you when you can start these exercises and which ones will work best for you. When you are not being active, find a comfortable position for rest. Some people are comfortable on the floor or a medium-firm bed with a small pillow under their head and another under their knees. Some people prefer to lie on their side with a pillow between their knees. Don't stay in one position for too long. Take short walks (10 to 20 minutes) every 2 to 3 hours. Avoid slopes, hills, and stairs until you feel better. Walk only distances you can manage without pain, especially leg pain. How to do the exercises  Pelvic tilt    1. Lie on your back with your knees bent. 2. \"Brace\" your stomach--tighten your muscles by pulling in and imagining your belly button moving toward your spine. 3. Press your lower back into the floor. You should feel your hips and pelvis rock back. 4. Hold for 6 seconds while breathing smoothly. 5. Relax and allow your pelvis and hips to rock forward. 6. Repeat 8 to 12 times. Back stretches    1. Get down on your hands and knees on the floor. 2. Relax your head and allow it to droop. Round your back up toward the ceiling until you feel a nice stretch in your upper, middle, and lower back. Hold this stretch for as long as it feels comfortable, or about 15 to 30 seconds. 3. Return to the starting position with a flat back while you are on your hands and knees. 4. Let your back sway by pressing your stomach toward the floor. Lift your buttocks toward the ceiling. 5. Hold this position for 15 to 30 seconds. 6. Repeat 2 to 4 times. Follow-up care is a key part of your treatment and safety.  Be sure to make and go to all appointments, and call your doctor if you are having problems. It's also a good idea to know your test results and keep a list of the medicines you take. Where can you learn more? Go to http://slava-todd.info/  Enter T094 in the search box to learn more about \"Low Back Arthritis: Exercises. \"  Current as of: July 1, 2021               Content Version: 13.2  © 2006-2022 [employer]. Care instructions adapted under license by [employer] (which disclaims liability or warranty for this information). If you have questions about a medical condition or this instruction, always ask your healthcare professional. [place] [address] any warranty or liability for your use of this information.

## 2022-05-16 NOTE — TELEPHONE ENCOUNTER
Patient Education        Back Pain: Care Instructions  Your Care Instructions     Back pain has many possible causes. It is often related to problems with muscles and ligaments of the back. It may also be related to problems with the nerves, discs, or bones of the back. Moving, lifting, standing, sitting, or sleeping in an awkward way can strain the back. Sometimes you don't notice theinjury until later. Arthritis is another common cause of back pain. Although it may hurt a lot, back pain usually improves on its own within several weeks. Most people recover in 12 weeks or less. Using good home treatment and being careful not to stress your back can help you feel bettersooner. Follow-up care is a key part of your treatment and safety. Be sure to make and go to all appointments, and call your doctor if you are having problems. It's also a good idea to know your test results and keep alist of the medicines you take. How can you care for yourself at home?  Sit or lie in positions that are most comfortable and reduce your pain. Try one of these positions when you lie down:  ? Lie on your back with your knees bent and supported by large pillows. ? Lie on the floor with your legs on the seat of a sofa or chair. ? Lie on your side with your knees and hips bent and a pillow between your legs. ? Lie on your stomach if it does not make pain worse.  Do not sit up in bed, and avoid soft couches and twisted positions. Bed rest can help relieve pain at first, but it delays healing. Avoid bed rest after the first day of back pain.  Change positions every 30 minutes. If you must sit for long periods of time, take breaks from sitting. Get up and walk around, or lie in a comfortable position.  Try using a heating pad on a low or medium setting for 15 to 20 minutes every 2 or 3 hours. Try a warm shower in place of one session with the heating pad.  You can also try an ice pack for 10 to 15 minutes every 2 to 3 hours. Patient called again regarding his paperwork.  Please advise patient at [phone] Put a thin cloth between the ice pack and your skin.  Take pain medicines exactly as directed. ? If the doctor gave you a prescription medicine for pain, take it as prescribed. ? If you are not taking a prescription pain medicine, ask your doctor if you can take an over-the-counter medicine.  Take short walks several times a day. You can start with 5 to 10 minutes, 3 or 4 times a day, and work up to longer walks. Walk on level surfaces and avoid hills and stairs until your back is better.  Return to work and other activities as soon as you can. Continued rest without activity is usually not good for your back.  To prevent future back pain, do exercises to stretch and strengthen your back and stomach. Learn how to use good posture, safe lifting techniques, and proper body mechanics. When should you call for help? Call your doctor now or seek immediate medical care if:     You have new or worsening numbness in your legs.      You have new or worsening weakness in your legs. (This could make it hard to stand up.)      You lose control of your bladder or bowels. Watch closely for changes in your health, and be sure to contact your doctor if:     You have a fever, lose weight, or don't feel well.      You do not get better as expected. Where can you learn more? Go to https://[employer]Range.[employer] [place]. org and sign in to your [employer] account. Enter U647 in the [employer] box to learn more about \"Back Pain: Care Instructions. \"     If you do not have an account, please click on the \"Sign Up Now\" link. Current as of: July 1, 2021               Content Version: 13.2  © 2006-2022 [employer]. Care instructions adapted under license by [address] If you have questions about a medical condition or this instruction, always ask your healthcare professional. [place] [address] any warranty or liability for your use of this information.

## 2022-07-11 ENCOUNTER — OFFICE VISIT (OUTPATIENT)
Dept: ORTHOPEDIC SURGERY | Age: 77
End: 2022-07-11
Payer: MEDICARE

## 2022-07-11 VITALS
SYSTOLIC BLOOD PRESSURE: 122 MMHG | RESPIRATION RATE: 18 BRPM | HEIGHT: 70 IN | BODY MASS INDEX: 31.02 KG/M2 | HEART RATE: 80 BPM | TEMPERATURE: 97.6 F | DIASTOLIC BLOOD PRESSURE: 63 MMHG | OXYGEN SATURATION: 97 %

## 2022-07-11 DIAGNOSIS — H54.8 LEGALLY BLIND: ICD-10-CM

## 2022-07-11 DIAGNOSIS — G47.9 SLEEP DISORDER: ICD-10-CM

## 2022-07-11 DIAGNOSIS — R29.898 WEAKNESS OF BOTH LEGS: ICD-10-CM

## 2022-07-11 DIAGNOSIS — B02.29 POST HERPETIC NEURALGIA: ICD-10-CM

## 2022-07-11 DIAGNOSIS — G89.29 OTHER CHRONIC PAIN: Primary | ICD-10-CM

## 2022-07-11 DIAGNOSIS — M47.816 LUMBAR SPONDYLOSIS: ICD-10-CM

## 2022-07-11 DIAGNOSIS — R26.9 GAIT ABNORMALITY: ICD-10-CM

## 2022-07-11 DIAGNOSIS — M48.062 SPINAL STENOSIS OF LUMBAR REGION WITH NEUROGENIC CLAUDICATION: ICD-10-CM

## 2022-07-11 PROCEDURE — 1123F ACP DISCUSS/DSCN MKR DOCD: CPT | Performed by: PHYSICAL MEDICINE & REHABILITATION

## 2022-07-11 PROCEDURE — 99214 OFFICE O/P EST MOD 30 MIN: CPT | Performed by: PHYSICAL MEDICINE & REHABILITATION

## 2022-07-11 PROCEDURE — G8432 DEP SCR NOT DOC, RNG: HCPCS | Performed by: PHYSICAL MEDICINE & REHABILITATION

## 2022-07-11 PROCEDURE — G8536 NO DOC ELDER MAL SCRN: HCPCS | Performed by: PHYSICAL MEDICINE & REHABILITATION

## 2022-07-11 PROCEDURE — 1101F PT FALLS ASSESS-DOCD LE1/YR: CPT | Performed by: PHYSICAL MEDICINE & REHABILITATION

## 2022-07-11 PROCEDURE — G8427 DOCREV CUR MEDS BY ELIG CLIN: HCPCS | Performed by: PHYSICAL MEDICINE & REHABILITATION

## 2022-07-11 PROCEDURE — G8417 CALC BMI ABV UP PARAM F/U: HCPCS | Performed by: PHYSICAL MEDICINE & REHABILITATION

## 2022-07-11 RX ORDER — GLUCOSAMINE SULFATE 1500 MG
POWDER IN PACKET (EA) ORAL DAILY
COMMUNITY

## 2022-07-11 NOTE — PROGRESS NOTES
[facility]  [address] [place], [facility] [address]  Phone: [phone]  Fax: [phone]    Pt's YOB: 1945    ASSESSMENT     1. Other chronic pain    2. Spinal stenosis of lumbar region with neurogenic claudication    3. Lumbar spondylosis    4. Post herpetic neuralgia    5. Gait abnormality    6. Legally blind    7. Weakness of both legs    8. Sleep disorder       IMPRESSION AND PLAN:  Rachid Castillo is a 64 y.o. male with history of chronic lumbar pain and presents to the office today for follow up accompanied by his grandson. Pt has discontinued Tylenol #3 1 tab 2-3 x daily as his pain warrants and is using the lidocaine patches which is helping more with his lumbar pain. He is still taking Cymbalta 30 mg 1 cap daily, Vitamin D supplement, and Topamax 100 mg 1 tab QAM and 2 tabs QHS. 1) Pt was given information on lumbar arthritis exercises. 2) He will continue taking Cymbalta 30 mg 1 cap daily, Vitamin D supplement, and Topamax 100 mg 1 tab QAM and 2 tabs QHS. He will also continue to use Lidoderm patches as needed for pain. 3) Pt will discontinue using Tylenol #3 prn as pain warrants. 4) Counseled on safety of pt and sleep schedule. 5) Discussed seated exercises and resistance band exercises to help with strength. 6) Mr. Cesar Fletcher has a reminder for a \"due or due soon\" health maintenance. I have asked that he contact his primary care provider, None, for follow-up on this health maintenance. 7)  demonstrated consistency with prescribing. 8) Last UDS from 10/11/2021 was consistent. Follow-up and Dispositions    · Return in about 4 months (around 11/11/2022) for Medication follow up. HISTORY OF PRESENT ILLNESS:  Rachid Castillo is a 64 y.o. male with history of chronic lumbar pain and presents to the office today for follow up accompanied by his son.  Pt mentions the lidocaine patches have helped with the pain, he further notes the patches working better when lying down. Pt has discontinued Tylenol #3 1 tab 2-3 x daily and has been using the lidocaine patches with better relief of his back pain. He is still taking Cymbalta 30 mg 1 cap daily, Vitamin D supplement, and Topamax 100 mg 1 tab QAM and 2 tabs QHS prescribed by Dr. Barbara Randhawa. Pt admits to pain radiating down to left knee and troubles with sleeping at night. He does take day time naps at times and then has difficulty falling asleep at night but this is not consistent. Pt has been treating his constipation with Miralax and dietary fiber. He does have some in home help that assist with meals, some ADL's and household tasks. Pt at this time desires to continue with current care. Of note, pt recently lost younger sister due to lung cancer and a brain tumor. 30 minutes was spent with pt and his son discussing medication, sleep hygiene,  symptoms, recent death of his sister and current plan of care.     Pain Scale: 7/10    PCP: None     Past Medical History:   Diagnosis Date    ED (erectile dysfunction)     Essential hypertension     Gout     Hypogonadism in male     Hypotestosteronism     Ill-defined condition     ESSENTIAL TREMORS    Legally blind     Nocturia     Nodular prostate without urinary obstruction     Prostate cancer ([facility] [address] 2/14/13    PNBx-T2a - Prostatic Volume: 26.7 grams - Paradox 6 (3 + 3) -  Dr. Love Rios    Retinitis pigmentosa of both eyes     Seasonal allergic reaction     Thyroid disease     Nodule    Tremors of nervous system         Social History     Socioeconomic History    Marital status:      Spouse name: Not on file    Number of children: Not on file    Years of education: Not on file    Highest education level: Not on file   Occupational History    Not on file   Tobacco Use    Smoking status: Never Smoker    Smokeless tobacco: Never Used   Vaping Use    Vaping Use: Never used   Substance and Sexual Activity  Alcohol use: Not Currently     Comment: seldom    Drug use: Never    Sexual activity: Yes   Other Topics Concern    Not on file   Social History Narrative    Not on file     Social Determinants of Health     Financial Resource Strain:     Difficulty of Paying Living Expenses: Not on file   Food Insecurity:     Worried About Running Out of Food in the Last Year: Not on file    Michael of Food in the Last Year: Not on file   Transportation Needs:     Lack of Transportation (Medical): Not on file    Lack of Transportation (Non-Medical): Not on file   Physical Activity:     Days of Exercise per Week: Not on file    Minutes of Exercise per Session: Not on file   Stress:     Feeling of Stress : Not on file   Social Connections:     Frequency of Communication with Friends and Family: Not on file    Frequency of Social Gatherings with Friends and Family: Not on file    Attends Islam Services: Not on file    Active Member of [address] [place] [address] or Organizations: Not on file    Attends Club or Organization Meetings: Not on file    Marital Status: Not on file   Intimate Partner Violence:     Fear of Current or Ex-Partner: Not on file    Emotionally Abused: Not on file    Physically Abused: Not on file    Sexually Abused: Not on file   Housing Stability:     Unable to Pay for Housing in the Last Year: Not on file    Number of [place] in the Last Year: Not on file    Unstable Housing in the Last Year: Not on file       Current Outpatient Medications   Medication Sig Dispense Refill    cholecalciferol (Vitamin D3) 25 mcg (1,000 unit) cap Take  by mouth daily.  lidocaine (LIDODERM) 5 % Apply patch to the affected area for 12 hours a day and remove for 12 hours a day. 180 Patch 1    polyethylene glycol (Miralax) 17 gram/dose powder Take 17 g by mouth daily as needed for Constipation. 510 g 5    testosterone (ANDROGEL) 20.25 mg/1.25 gram (1.62 %) gel Apply 1 pump daily to inner thigh.  1 Each 1    diclofenac (VOLTAREN) 1 % gel Apply  to affected area four (4) times daily. Apply to right hip 4 times per day as directed 5 Each 3    acetaminophen-codeine (TYLENOL #3) 300-30 mg per tablet TAKE 1 TABLET BY MOUTH 3 TIMES A DAY AS NEEDED FOR PAIN FOR UP TO 30 DAYS (MAXIMUM 3 TABS PER DAY)      fluticasone (FLONASE) 50 mcg/actuation nasal spray 2 Sprays by Both Nostrils route daily as needed for Allergies or Rhinitis.  DULoxetine (CYMBALTA) 30 mg capsule Take 30 mg by mouth daily.  NIFEdipine ER (Adalat CC) 60 mg ER tablet Take 60 mg by mouth daily.  spironolactone (ALDACTONE) 25 mg tablet Take 25 mg by mouth two (2) times a day.  topiramate (Topamax) 100 mg tablet Take 100 mg by mouth two (2) times a day. takes 100 mg in AM , 200 mg every PM for a total of 300 mg per day  Indications: muscle tremors      alprostadiL ([place]) 500 mcg supp 500 mcg by IntraURETHral route daily as needed for PRN Reason (Other). Indications: the inability to have an erection 15 Suppository 1    allopurinol (ZYLOPRIM) 100 mg tablet Take 100 mg by mouth every other day. (Patient not taking: Reported on 7/11/2022)      hydrocortisone (ANUSOL-HC) 2.5 % rectal cream Insert  into rectum two (2) times a day. Use as needed as directed (Patient taking differently: Insert 1 Applicator into rectum two (2) times daily as needed for Itching. Use as needed as directed) 90 g 1    butenafine (MENTAX) 1 % topical cream Apply  to affected area as needed. Allergies   Allergen Reactions    Iodinated Contrast Media Itching    Iodine Hives     Other reaction(s): Unknown    Spironolactone Unknown (comments)         REVIEW OF SYSTEMS    Constitutional: Negative for fever, chills, or weight change. Respiratory: Negative for cough or shortness of breath. Cardiovascular: Negative for chest pain or palpitations. Gastrointestinal: Negative for acid reflux, change in bowel habits, or constipation.   Genitourinary: Negative for dysuria and flank pain. Musculoskeletal: Positive for lumbar and left thigh pain. Skin: Negative for rash. Neurological: Negative for headaches, dizziness, or numbness. Endo/Heme/Allergies: Negative for increased bruising. Psychiatric/Behavioral: Negative for difficulty with sleep. As per HPI    PHYSICAL EXAMINATION  Visit Vitals  /63 (BP 1 Location: Left arm, BP Patient Position: Sitting)   Pulse 80   Temp 97.6 °F (36.4 °C) (Temporal)   Resp 18   Ht 5' 10\" (1.778 m)   SpO2 97%   BMI 31.02 kg/m²       Constitutional: Awake, alert, and in no acute distress. Neurological: Sensation to light touch is intact. Negative Malloy's sign bilaterally. Skin: warm, dry, and intact. Musculoskeletal: Minimal pain with extension, axial loading, or forward flexion. No pain with internal or external rotation of his hips. Negative straight leg raise bilaterally. Pt ambulates today with four wheeled rolling walker. Hip Flex  Quads Hamstrings Ankle DF EHL Ankle PF   Right 4/5 4/5 4/5 4/5 4/5 4/5   Left 4/5 4/5 4/5 4/5 4/5 4/5     IMAGING:    Lumbar spine MRI from 07/24/2020 were personally reviewed with the patient and demonstrated:          Results from [place] encounter on 07/24/20   MRI LUMB SPINE W WO CONT     Narrative Sagittal and axial multisequence MR images of lumbar spine were obtained without  and with 20 cc Dotarem gadolinium IV contrast     HISTORY: Back pain and left sciatica.     COMPARISON: August 6, 2010     Loss of lumbar lordosis, similar to before. Trace retrolisthesis L5.     No focal compression fracture. Patchy fat signal in the bone marrow with no  pathologic marrow signal abnormalities. No abnormal enhancement. Conus  medullaris ends at L1 with normal morphology and signal intensity.     Multiple large left renal cysts.     L1-L2: Posterior lateral corner disc protrusion. No central stenosis. Mild facet  and ligamentous hypertrophy.  Mild foraminal narrowing with no nerve root  compression.     L2-L3: Posterior disc bulge and small left paracentral disc protrusion. No  significant central stenosis.  Facet and ligamentous hypertrophy. Mild foraminal  narrowing with no nerve root compression.     L3-L4: Mild posterior disc bulge. Facet and ligamentous hypertrophy. Posterior  epidural fat present. AP canal measures 9.9 mm  No significant central stenosis. Mild foraminal narrowing with no nerve root compression.     L4-L5: Mild posterior disc bulge with left lateral disc protrusion/posterior  lateral annular tear. Facet and ligamentous hypertrophy. No significant central  stenosis. Mild to moderate foraminal stenosis but no definite exiting nerve root  compression. Possible mild arachnoiditis     L5-S1: Left laminotomy. Mild posterior disc bulge and endplate spondylosis,  slightly worse on the left. No central stenosis. Right facet and ligamentous  hypertrophy. Moderate right foraminal stenosis with compression of the right  exiting L5 nerve root. Mild-to-moderate left foraminal stenosis, mild  compression of left exiting L5 nerve root also possible.        Impression IMPRESSION: Degenerative disease worst at L4-L5 and L5-S1, with more severe  foraminal stenosis and nerve root compression at L5-S1. Right slightly worse  than left. However, patient is more symptomatic on the left. Written by Abi Niño, as dictated by Ramona Meier MD.  I, Dr. Ramona Meier confirm that all documentation is accurate.

## 2022-07-11 NOTE — PATIENT INSTRUCTIONS
Low Back Arthritis: Exercises  Introduction  Here are some examples of typical rehabilitation exercises for your condition. Start each exercise slowly. Ease off the exercise if you start to have pain. Your doctor or physical therapist will tell you when you can start these exercises and which ones will work best for you. When you are not being active, find a comfortable position for rest. Some people are comfortable on the floor or a medium-firm bed with a small pillow under their head and another under their knees. Some people prefer to lie on their side with a pillow between their knees. Don't stay in one position for too long. Take short walks (10 to 20 minutes) every 2 to 3 hours. Avoid slopes, hills, and stairs until you feel better. Walk only distances you can manage without pain, especially leg pain. How to do the exercises  Pelvic tilt    1. Lie on your back with your knees bent. 2. \"Brace\" your stomachtighten your muscles by pulling in and imagining your belly button moving toward your spine. 3. Press your lower back into the floor. You should feel your hips and pelvis rock back. 4. Hold for 6 seconds while breathing smoothly. 5. Relax and allow your pelvis and hips to rock forward. 6. Repeat 8 to 12 times. Back stretches    1. Get down on your hands and knees on the floor. 2. Relax your head and allow it to droop. Round your back up toward the ceiling until you feel a nice stretch in your upper, middle, and lower back. Hold this stretch for as long as it feels comfortable, or about 15 to 30 seconds. 3. Return to the starting position with a flat back while you are on your hands and knees. 4. Let your back sway by pressing your stomach toward the floor. Lift your buttocks toward the ceiling. 5. Hold this position for 15 to 30 seconds. 6. Repeat 2 to 4 times. Follow-up care is a key part of your treatment and safety.  Be sure to make and go to all appointments, and call your doctor if you are having problems. It's also a good idea to know your test results and keep a list of the medicines you take. Where can you learn more? Go to http://www.[employer].com/  Enter T094 in the search box to learn more about \"Low Back Arthritis: Exercises. \"  Current as of: July 1, 2021               Content Version: 13.2  © 2006-2022 [employer]. Care instructions adapted under license by [employer] (which disclaims liability or warranty for this information). If you have questions about a medical condition or this instruction, always ask your healthcare professional. [place] [address] any warranty or liability for your use of this information.

## 2022-07-27 ENCOUNTER — ANESTHESIA EVENT (OUTPATIENT)
Dept: ENDOSCOPY | Age: 77
End: 2022-07-27
Payer: MEDICARE

## 2022-07-28 ENCOUNTER — HOSPITAL ENCOUNTER (OUTPATIENT)
Age: 77
Setting detail: OUTPATIENT SURGERY
Discharge: HOME | End: 2022-07-28
Attending: INTERNAL MEDICINE | Admitting: INTERNAL MEDICINE
Payer: MEDICARE

## 2022-07-28 ENCOUNTER — ANESTHESIA (OUTPATIENT)
Dept: ENDOSCOPY | Age: 77
End: 2022-07-28
Payer: MEDICARE

## 2022-07-28 VITALS
WEIGHT: 215 LBS | BODY MASS INDEX: 30.78 KG/M2 | SYSTOLIC BLOOD PRESSURE: 129 MMHG | HEART RATE: 75 BPM | HEIGHT: 70 IN | RESPIRATION RATE: 16 BRPM | OXYGEN SATURATION: 100 % | DIASTOLIC BLOOD PRESSURE: 72 MMHG | TEMPERATURE: 97.4 F

## 2022-07-28 LAB
ATRIAL RATE: 86 BPM
CALCULATED P AXIS, ECG09: 42 DEGREES
CALCULATED R AXIS, ECG10: -53 DEGREES
CALCULATED T AXIS, ECG11: 90 DEGREES
DIAGNOSIS, 93000: NORMAL
P-R INTERVAL, ECG05: 216 MS
Q-T INTERVAL, ECG07: 396 MS
QRS DURATION, ECG06: 122 MS
QTC CALCULATION (BEZET), ECG08: 473 MS
VENTRICULAR RATE, ECG03: 86 BPM

## 2022-07-28 PROCEDURE — 74011250636 HC RX REV CODE- 250/636: Performed by: NURSE ANESTHETIST, CERTIFIED REGISTERED

## 2022-07-28 PROCEDURE — 77030013992 HC SNR POLYP ENDOSC BSC -B: Performed by: INTERNAL MEDICINE

## 2022-07-28 PROCEDURE — 99100 ANES PT EXTEME AGE<1 YR&>70: CPT | Performed by: NURSE ANESTHETIST, CERTIFIED REGISTERED

## 2022-07-28 PROCEDURE — 88305 TISSUE EXAM BY PATHOLOGIST: CPT

## 2022-07-28 PROCEDURE — 74011000250 HC RX REV CODE- 250: Performed by: NURSE ANESTHETIST, CERTIFIED REGISTERED

## 2022-07-28 PROCEDURE — 76040000019: Performed by: INTERNAL MEDICINE

## 2022-07-28 PROCEDURE — 00811 ANES LWR INTST NDSC NOS: CPT | Performed by: ANESTHESIOLOGY

## 2022-07-28 PROCEDURE — 76060000031 HC ANESTHESIA FIRST 0.5 HR: Performed by: INTERNAL MEDICINE

## 2022-07-28 PROCEDURE — 74011250637 HC RX REV CODE- 250/637: Performed by: NURSE ANESTHETIST, CERTIFIED REGISTERED

## 2022-07-28 PROCEDURE — 00811 ANES LWR INTST NDSC NOS: CPT | Performed by: NURSE ANESTHETIST, CERTIFIED REGISTERED

## 2022-07-28 PROCEDURE — 2709999900 HC NON-CHARGEABLE SUPPLY: Performed by: INTERNAL MEDICINE

## 2022-07-28 PROCEDURE — 77030008565 HC TBNG SUC IRR ERBE -B: Performed by: INTERNAL MEDICINE

## 2022-07-28 PROCEDURE — 99100 ANES PT EXTEME AGE<1 YR&>70: CPT | Performed by: ANESTHESIOLOGY

## 2022-07-28 PROCEDURE — 93005 ELECTROCARDIOGRAM TRACING: CPT

## 2022-07-28 RX ORDER — LIDOCAINE HYDROCHLORIDE 10 MG/ML
0.1 INJECTION, SOLUTION EPIDURAL; INFILTRATION; INTRACAUDAL; PERINEURAL AS NEEDED
Status: DISCONTINUED | OUTPATIENT
Start: 2022-07-28 | End: 2022-07-28 | Stop reason: HOSPADM

## 2022-07-28 RX ORDER — SODIUM CHLORIDE, SODIUM LACTATE, POTASSIUM CHLORIDE, CALCIUM CHLORIDE 600; 310; 30; 20 MG/100ML; MG/100ML; MG/100ML; MG/100ML
25 INJECTION, SOLUTION INTRAVENOUS CONTINUOUS
Status: DISCONTINUED | OUTPATIENT
Start: 2022-07-28 | End: 2022-07-28 | Stop reason: HOSPADM

## 2022-07-28 RX ORDER — PHENYLEPHRINE HCL IN 0.9% NACL 1 MG/10 ML
SYRINGE (ML) INTRAVENOUS AS NEEDED
Status: DISCONTINUED | OUTPATIENT
Start: 2022-07-28 | End: 2022-07-28 | Stop reason: HOSPADM

## 2022-07-28 RX ORDER — LIDOCAINE HYDROCHLORIDE 20 MG/ML
INJECTION, SOLUTION EPIDURAL; INFILTRATION; INTRACAUDAL; PERINEURAL AS NEEDED
Status: DISCONTINUED | OUTPATIENT
Start: 2022-07-28 | End: 2022-07-28 | Stop reason: HOSPADM

## 2022-07-28 RX ORDER — PROPOFOL 10 MG/ML
INJECTION, EMULSION INTRAVENOUS AS NEEDED
Status: DISCONTINUED | OUTPATIENT
Start: 2022-07-28 | End: 2022-07-28 | Stop reason: HOSPADM

## 2022-07-28 RX ORDER — INSULIN LISPRO 100 [IU]/ML
INJECTION, SOLUTION INTRAVENOUS; SUBCUTANEOUS ONCE
Status: DISCONTINUED | OUTPATIENT
Start: 2022-07-28 | End: 2022-07-28 | Stop reason: HOSPADM

## 2022-07-28 RX ORDER — FAMOTIDINE 20 MG/1
20 TABLET, FILM COATED ORAL ONCE
Status: COMPLETED | OUTPATIENT
Start: 2022-07-28 | End: 2022-07-28

## 2022-07-28 RX ADMIN — PROPOFOL 100 MG: 10 INJECTION, EMULSION INTRAVENOUS at 10:53

## 2022-07-28 RX ADMIN — PROPOFOL 20 MG: 10 INJECTION, EMULSION INTRAVENOUS at 11:02

## 2022-07-28 RX ADMIN — PROPOFOL 20 MG: 10 INJECTION, EMULSION INTRAVENOUS at 10:55

## 2022-07-28 RX ADMIN — LIDOCAINE HYDROCHLORIDE 80 MG: 20 INJECTION, SOLUTION EPIDURAL; INFILTRATION; INTRACAUDAL; PERINEURAL at 10:53

## 2022-07-28 RX ADMIN — Medication 100 MCG: at 11:12

## 2022-07-28 RX ADMIN — SODIUM CHLORIDE, POTASSIUM CHLORIDE, SODIUM LACTATE AND CALCIUM CHLORIDE 25 ML/HR: 600; 310; 30; 20 INJECTION, SOLUTION INTRAVENOUS at 10:16

## 2022-07-28 RX ADMIN — PROPOFOL 20 MG: 10 INJECTION, EMULSION INTRAVENOUS at 11:00

## 2022-07-28 RX ADMIN — FAMOTIDINE 20 MG: 20 TABLET ORAL at 10:16

## 2022-07-28 RX ADMIN — PROPOFOL 20 MG: 10 INJECTION, EMULSION INTRAVENOUS at 10:57

## 2022-07-28 RX ADMIN — PROPOFOL 20 MG: 10 INJECTION, EMULSION INTRAVENOUS at 10:58

## 2022-07-28 NOTE — ANESTHESIA PREPROCEDURE EVALUATION
Relevant Problems   PERSONAL HX & FAMILY HX OF CANCER   (+) Prostate cancer ([facility] [address]       Anesthetic History   No history of anesthetic complications            Review of Systems / Medical History  Patient summary reviewed and pertinent labs reviewed    Pulmonary  Within defined limits                 Neuro/Psych   Within defined limits           Cardiovascular    Hypertension: well controlled              Exercise tolerance: >4 METS     GI/Hepatic/Renal  Within defined limits              Endo/Other      Hypothyroidism: well controlled  Arthritis     Other Findings   Comments: Legally Blond           Physical Exam    Airway  Mallampati: II  TM Distance: 4 - 6 cm  Neck ROM: normal range of motion   Mouth opening: Normal     Cardiovascular  Regular rate and rhythm,  S1 and S2 normal,  no murmur, click, rub, or gallop             Dental  No notable dental hx       Pulmonary  Breath sounds clear to auscultation               Abdominal  GI exam deferred       Other Findings            Anesthetic Plan    ASA: 3  Anesthesia type: MAC          Induction: Intravenous  Anesthetic plan and risks discussed with: Patient

## 2022-07-28 NOTE — DISCHARGE INSTRUCTIONS
Colonoscopy: What to Expect at [address] [place] [address]  After a colonoscopy, you'll stay at the clinic until you wake up. Then you can go home. But you'll need to arrange for a ride. Your doctor will tell you when you can eat and do your other usual activities. Your doctor will talk to you about when you'll need your next colonoscopy. Your doctor can help you decide how often you need to be checked. This will depend on the results of your test and your risk for colorectal cancer. After the test, you may be bloated or have gas pains. You may need to pass gas. If a biopsy was done or a polyp was removed, you may have streaks of blood in your stool (feces) for a few days. Problems such as heavy rectal bleeding may not occur until several weeks after the test. This isn't common. But it can happen after polyps are removed. This care sheet gives you a general idea about how long it will take for you to recover. But each person recovers at a different pace. Follow the steps below to get better as quickly as possible. How can you care for yourself at home? Activity    Rest when you feel tired. You can do your normal activities when it feels okay to do so. Diet    Follow your doctor's directions for eating. Unless your doctor has told you not to, drink plenty of fluids. This helps to replace the fluids that were lost during the colon prep. Do not drink alcohol. Medicines    Your doctor will tell you if and when you can restart your medicines. You will also be given instructions about taking any new medicines. If you take aspirin or some other blood thinner, ask your doctor if and when to start taking it again. Make sure that you understand exactly what your doctor wants you to do. If polyps were removed or a biopsy was done during the test, your doctor may tell you not to take aspirin or other anti-inflammatory medicines for a few days. These include ibuprofen (Advil, Motrin) and naproxen (Aleve). Other instructions    For your safety, do not drive or operate machinery until the medicine wears off and you can think clearly. Your doctor may tell you not to drive or operate machinery until the day after your test.     Do not sign legal documents or make major decisions until the medicine wears off and you can think clearly. The anesthesia can make it hard for you to fully understand what you are agreeing to. Follow-up care is a key part of your treatment and safety. Be sure to make and go to all appointments, and call your doctor if you are having problems. It's also a good idea to know your test results and keep a list of the medicines you take. When should you call for help? Call 911 anytime you think you may need emergency care. For example, call if:    You passed out (lost consciousness). You pass maroon or bloody stools. You have trouble breathing. Call your doctor now or seek immediate medical care if:    You have pain that does not get better after you take pain medicine. You are sick to your stomach or cannot drink fluids. You have new or worse belly pain. You have blood in your stools. You have a fever. You cannot pass stools or gas. Watch closely for changes in your health, and be sure to contact your doctor if you have any problems. Where can you learn more? Go to http://www.gray.com/  Enter E264 in the search box to learn more about \"Colonoscopy: What to Expect at Home. \"  Current as of: September 8, 2021               Content Version: 13.2  © 2006-2022 Healthwise[employer]. Care instructions adapted under license by [employer] (which disclaims liability or warranty for this information). If you have questions about a medical condition or this instruction, always ask your healthcare professional. [place] [address] any warranty or liability for your use of this information.     DISCHARGE SUMMARY from Nurse     POST-PROCEDURE INSTRUCTIONS:    Call your Physician if you:  Observe any excess bleeding. Develop a temperature over 100.5o F. Experience abdominal, shoulder or chest pain. Notice any signs of decreased circulation or nerve impairment to an extremity such as a change in color, persistent numbness, tingling, coldness or increase in pain. Vomit blood or you have nausea and vomiting lasting longer than 4 hours. Are unable to take medications. Are unable to urinate within 8 hours after discharge following general anesthesia or intravenous sedation. For the next 24 hours after receiving general anesthesia or intravenous sedation, or while taking prescription Narcotics, limit your activities:  Do NOT drive a motor vehicle, operate hazard machinery or power tools, or perform tasks that require coordination. The medication you received during your procedure may have some effect on your mental awareness. Do NOT make important personal or business decisions. The medication you received during your procedure may have some effect on your mental awareness. Do NOT drink alcoholic beverages. These drinks do not mix well with the medications that have been given to you. Upon discharge from the hospital, you must be accompanied by a responsible adult. Resume your diet as directed by your physician. Resume medications as your physician has prescribed. Please give a list of your current medications to your Primary Care Provider. Please update this list whenever your medications are discontinued, doses are changed, or new medications (including over-the-counter products) are added. Please carry medication information at all times in case of emergency situations. These are general instructions for a healthy lifestyle:    No smoking/ No tobacco products/ Avoid exposure to second hand smoke.   Surgeon General's Warning:  Quitting smoking now greatly reduces serious risk to your health. Obesity, smoking, and a sedentary lifestyle greatly increase your risk for illness. A healthy diet, regular physical exercise & weight monitoring are important for maintaining a healthy lifestyle  You may be retaining fluid if you have a history of heart failure or if you experience any of the following symptoms:  Weight gain of 3 pounds or more overnight or 5 pounds in a week, increased swelling in our hands or feet or shortness of breath while lying flat in bed. Please call your doctor as soon as you notice any of these symptoms; do not wait until your next office visit. Recognize signs and symptoms of STROKE:  F  -  Face looks uneven  A  -  Arms unable to move or move unevenly  S  -  Speech slurred or non-existent  T  -  Time to call 911 - as soon as signs and symptoms begin - DO NOT go back to bed or wait to see If you get better - TIME IS BRAIN. Colorectal Screening  Colorectal cancer almost always develops from precancerous polyps (abnormal growths) in the colon or rectum. Screening tests can find precancerous polyps, so that they can be removed before they turn into cancer. Screening tests can also find colorectal cancer early, when treatment works best.  Speak with your physician about when you should begin screening and how often you should be tested. [facility] Activation    Thank you for requesting access to [employer]. Please follow the instructions below to securely access and download your online medical record. [employer] allows you to send messages to your doctor, view your test results, renew your prescriptions, schedule appointments, and more. How Do I Sign Up? In your internet browser, go to [email or web address]. Click on the First Time User? Click Here link in the Sign In box. You will see the New Member Sign Up page. Enter your [employer] Access Code exactly as it appears below.  You will not need to use this code after youve completed the sign-up process. If you do not sign up before the expiration date, you must request a new code. [employer] Access Code: Activation code not generated  Current [facility] Status: Active (This is the date your [facility] access code will )    Enter the last four digits of your Social Security Number (xxxx) and Date of Birth (mm/dd/yyyy) as indicated and click Submit. You will be taken to the next sign-up page. Create a [facility] ID. This will be your [facility] login ID and cannot be changed, so think of one that is secure and easy to remember. Create a [employer] password. You can change your password at any time. Enter your Password Reset Question and Answer. This can be used at a later time if you forget your password. Enter your e-mail address. You will receive e-mail notification when new information is available in [address] Click Sign Up. You can now view and download portions of your medical record. Click the [employer] link to download a portable copy of your medical information. Additional Information    If you have questions, please call [phone]. Remember, [facility] is NOT to be used for urgent needs. For medical emergencies, dial 911. Educational references and/or instructions provided during this visit included:    See Attached      APPOINTMENTS:    Per MD Instruction    Discharge information has been reviewed with the patient. The patient verbalized understanding. Colon Polyps: Care Instructions  Your Care Instructions     Colon polyps are growths in the colon or the rectum. The cause of most colon polyps is not known, and most people who get them do not have any problems. But a certain kind can turn into cancer. For this reason, regular testing for colon polyps is important for people as they get older. It is also important for anyone who has an increased risk for colon cancer. Polyps are usually found through routine colon cancer screening tests.  Although most colon polyps are not cancerous, they are usually removed and then tested for cancer. Screening for colon cancer saves lives because the cancer can usually be cured if it is caught early. If you have a polyp that is the type that can turn into cancer, you may need more tests to examine your entire colon. The doctor will remove any other polyps that he or she finds, and you will be tested more often. Follow-up care is a key part of your treatment and safety. Be sure to make and go to all appointments, and call your doctor if you are having problems. It's also a good idea to know your test results and keep a list of the medicines you take. How can you care for yourself at home? Regular exams to look for colon polyps are the best way to prevent polyps from turning into colon cancer. These can include stool tests, sigmoidoscopy, colonoscopy, and CT colonography. Talk with your doctor about a testing schedule that is right for you. To prevent polyps  There is no home treatment that can prevent colon polyps. But these steps may help lower your risk for cancer. Stay active. Being active can help you get to and stay at a healthy weight. Try to exercise on most days of the week. Walking is a good choice. Eat well. Choose a variety of vegetables, fruits, legumes (such as peas and beans), fish, poultry, and whole grains. Do not smoke. If you need help quitting, talk to your doctor about stop-smoking programs and medicines. These can increase your chances of quitting for good. If you drink alcohol, limit how much you drink. Limit alcohol to 2 drinks a day for men and 1 drink a day for women. When should you call for help? Call your doctor now or seek immediate medical care if:    You have severe belly pain. Your stools are maroon or very bloody. Watch closely for changes in your health, and be sure to contact your doctor if:    You have a fever. You have nausea or vomiting.      You have a change in bowel habits (new constipation or diarrhea). Your symptoms get worse or are not improving as expected. Where can you learn more? Go to http://www.Nanorex.com/  Enter C571 in the search box to learn more about \"Colon Polyps: Care Instructions. \"  Current as of: September 8, 2021               Content Version: 13.2  © 2006-2022 [employer]. Care instructions adapted under license by [employer] (which disclaims liability or warranty for this information). If you have questions about a medical condition or this instruction, always ask your healthcare professional. [place] [address] any warranty or liability for your use of this information. Hemorrhoids: Care Instructions  Overview     Hemorrhoids are swollen veins that develop in the anal canal. Bleeding during bowel movements, itching, and rectal pain are the most common symptoms. Hemorrhoids can be uncomfortable at times, but rarely are they a serious problem. Most of the time, you can treat them with simple changes to your diet and bowel habits. These changes include eating more fiber and not straining to pass stools. Most hemorrhoids don't need surgery or other treatment unless they are very large and painful or bleed a lot. Follow-up care is a key part of your treatment and safety. Be sure to make and go to all appointments, and call your doctor if you are having problems. It's also a good idea to know your test results and keep a list of the medicines you take. How can you care for yourself at home? Sit in a few inches of warm water (sitz bath) 3 times a day and after bowel movements. The warm water helps with pain and itching. Put ice on your anal area several times a day for 10 minutes at a time. Put a thin cloth between the ice and your skin. Follow this by placing a warm, wet towel on the area for another 10 to 20 minutes. Take pain medicines exactly as directed.   If the doctor gave you a prescription medicine for pain, take it as prescribed. If you are not taking a prescription pain medicine, ask your doctor if you can take an over-the-counter medicine. Keep the anal area clean, but be gentle. Use water and a fragrance-free soap, or use baby wipes or medicated pads such as Tucks. Wear cotton underwear and loose clothing to decrease moisture in the anal area. Eat more fiber. Include foods such as whole-grain breads and cereals, raw vegetables, raw and dried fruits, and beans. Drink plenty of fluids. If you have kidney, heart, or liver disease and have to limit fluids, talk with your doctor before you increase the amount of fluids you drink. Use a stool softener that contains bran or psyllium. You can save money by buying bran or psyllium (available in bulk at most health food stores) and sprinkling it on foods or stirring it into fruit juice. Or you can use a product such as Metamucil or Hydrocil. Practice healthy bowel habits. Go to the bathroom as soon as you have the urge. Avoid straining to pass stools. Relax and give yourself time to let things happen naturally. Do not hold your breath while passing stools. Do not read while sitting on the toilet. Get off the toilet as soon as you have finished. Take your medicines exactly as prescribed. Call your doctor if you think you are having a problem with your medicine. When should you call for help? Call 911 anytime you think you may need emergency care. For example, call if:    You pass maroon or very bloody stools. Call your doctor now or seek immediate medical care if:    You have increased pain. You have increased bleeding. Watch closely for changes in your health, and be sure to contact your doctor if:    Your symptoms have not improved after 3 or 4 days. Where can you learn more?   Go to http://www.gray.com/  Enter F228 in the search box to learn more about \"Hemorrhoids: Care Instructions. \"  Current as of: September 8, 2021               Content Version: 13.2  © 9589-4354 [employer]. Care instructions adapted under license by [employer] (which disclaims liability or warranty for this information). If you have questions about a medical condition or this instruction, always ask your healthcare professional. [address] any warranty or liability for your use of this information. Diverticulosis: Care Instructions  Your Care Instructions  In diverticulosis, pouches called diverticula form in the wall of the large intestine (colon). The pouches do not cause any pain or other symptoms. Most people who have diverticulosis do not know they have it. But the pouches sometimes bleed, and if they become infected, they can cause pain and other symptoms. When this happens, it is called diverticulitis. Diverticula form when pressure pushes the wall of the colon outward at certain weak points. A diet that is too low in fiber can cause diverticula. Follow-up care is a key part of your treatment and safety. Be sure to make and go to all appointments, and call your doctor if you are having problems. It's also a good idea to know your test results and keep a list of the medicines you take. How can you care for yourself at home? Include fruits, leafy green vegetables, beans, and whole grains in your diet each day. These foods are high in fiber. Take a fiber supplement, such as Citrucel or Metamucil, every day if needed. Read and follow all instructions on the label. Drink plenty of fluids. If you have kidney, heart, or liver disease and have to limit fluids, talk with your doctor before you increase the amount of fluids you drink. Get at least 30 minutes of exercise on most days of the week. Walking is a good choice. You also may want to do other activities, such as running, swimming, cycling, or playing tennis or team sports.   Cut out foods that cause gas, pain, or other symptoms. When should you call for help? Call your doctor now or seek immediate medical care if:    You have belly pain. You pass maroon or very bloody stools. You have a fever. You have nausea and vomiting. You have unusual changes in your bowel movements or abdominal swelling. You have burning pain when you urinate. You have abnormal vaginal discharge. You have shoulder pain. You have cramping pain that does not get better when you have a bowel movement or pass gas. You pass gas or stool from your urethra while urinating. Watch closely for changes in your health, and be sure to contact your doctor if you have any problems. Where can you learn more? Go to http://www.gray.com/  Enter [id number] in the search box to learn more about \"Diverticulosis: Care Instructions. \"  Current as of: September 8, 2021               Content Version: 13.2  © 2006-2022 [employer] Care instructions adapted under license by [employer] (which disclaims liability or warranty for this information). If you have questions about a medical condition or this instruction, always ask your healthcare professional. [place] [address] any warranty or liability for your use of this information.

## 2022-07-28 NOTE — PROGRESS NOTES
[email or web address]  [place], [address]      Brief Procedure Note    Tate Alston  1945  [id number]    Date of Procedure: 7/28/2022    Preoperative diagnosis: Personal history of colonic polyps:  V12.72 - Z86.010    Postoperative diagnosis: Diverticulosis, hemorrhoids, Transverse polyp x3, descending polyp x1     Type of Anesthesia: MAC (Monitored anesthesia care)    Description of findings: same as post op dx    Procedure: Procedure(s):  COLONOSCOPY with polypectomies    :  Dr. Sumit Dupree MD    Assistant(s): Endoscopy Technician-1: Bernetta Goodell Float Staff: Riana Noriega RN    Devices/implants/grafts/tissues/prosthesis: None    EBL:None    Specimens:   ID Type Source Tests Collected by Time Destination   1 : Transverse polyps Preservative Colon, Donzetta Farnaz  Melanie Mesa MD 7/28/2022 1059 Pathology   2 : descending polyp  Preservative Colon, Descending  Melanie Mesa MD 7/28/2022 1112 Pathology       Findings: See printed and scanned procedure note    Complications: None    Dr. Sumit Dupree MD  7/28/2022  11:14 AM

## 2022-07-28 NOTE — H&P
[email or web address]  [phone]    Gastroenterology pre op History and Physical     Impression:   1. High risk colon cancer screening/Colon polyp surveillance exam      Plan:     1. Colonoscopy    Addendum: All lab tests orders pertaining to the procedure have been ordered by the anesthesia personnel and results will be addressed by the anesthesia team      Chief Complaint: high risk colon cancer screening exam.      HPI:  Nita Cabrera is a 64 y.o. male who is being seen on consult for high risk colon cancer screening with colonoscopy.  There is a previous history of colon polyps, and the patient returns for a surveillence exam    PMH:   Past Medical History:   Diagnosis Date    ED (erectile dysfunction)     Essential hypertension     Gout     Hypogonadism in male     Hypotestosteronism     Ill-defined condition     ESSENTIAL TREMORS    Legally blind     Nocturia     Nodular prostate without urinary obstruction     Prostate cancer ([facility] [address] 2/14/13    PNBx-T2a - Prostatic Volume: 26.7 grams - Jeffery 6 (3 + 3) -  Dr. Victoria Merritt    Retinitis pigmentosa of both eyes     Seasonal allergic reaction     Thyroid disease     Nodule    Tremors of nervous system        PSH:   Past Surgical History:   Procedure Laterality Date    COLONOSCOPY N/A 4/12/2021    COLONOSCOPY with polypectomies performed by Savanna Berry MD at [facility]    HX APPENDECTOMY      HX [address]    HX CARPAL TUNNEL RELEASE      HX CATARACT REMOVAL      HX HERNIA REPAIR      umbilical       Social HX:   Social History     Socioeconomic History    Marital status:      Spouse name: Not on file    Number of children: Not on file    Years of education: Not on file    Highest education level: Not on file   Occupational History    Not on file   Tobacco Use    Smoking status: Never    Smokeless tobacco: Never   Vaping Use    Vaping Use: Never used   Substance and Sexual Activity    Alcohol use: Not Currently     Comment: seldom    Drug use: Never    Sexual activity: Yes   Other Topics Concern    Not on file   Social History Narrative    Not on file     Social Determinants of Health     Financial Resource Strain: Not on file   Food Insecurity: Not on file   Transportation Needs: Not on file   Physical Activity: Not on file   Stress: Not on file   Social Connections: Not on file   Intimate Partner Violence: Not on file   Housing Stability: Not on file       FHX:   Family History   Problem Relation Age of Onset    Cancer Sister        Allergy:   Allergies   Allergen Reactions    Iodinated Contrast Media Itching    Iodine Hives     Other reaction(s): Unknown    Spironolactone Unknown (comments)     Pt denies       Home Medications:     Medications Prior to Admission   Medication Sig    cholecalciferol (VITAMIN D3) 25 mcg (1,000 unit) cap Take  by mouth daily. lidocaine (LIDODERM) 5 % Apply patch to the affected area for 12 hours a day and remove for 12 hours a day. polyethylene glycol (Miralax) 17 gram/dose powder Take 17 g by mouth daily as needed for Constipation. diclofenac (VOLTAREN) 1 % gel Apply  to affected area four (4) times daily. Apply to right hip 4 times per day as directed    allopurinol (ZYLOPRIM) 100 mg tablet Take 100 mg by mouth every other day. fluticasone propionate (FLONASE) 50 mcg/actuation nasal spray 2 Sprays by Both Nostrils route daily as needed for Allergies or Rhinitis. DULoxetine (CYMBALTA) 30 mg capsule Take 30 mg by mouth daily. NIFEdipine ER (ADALAT CC) 60 mg ER tablet Take 60 mg by mouth daily. spironolactone (ALDACTONE) 25 mg tablet Take 25 mg by mouth two (2) times a day. topiramate (TOPAMAX) 100 mg tablet Take 100 mg by mouth two (2) times a day. takes 100 mg in AM , 200 mg every PM for a total of 300 mg per day  Indications: muscle tremors    alprostadiL (Muse) 500 mcg supp 500 mcg by IntraURETHral route daily as needed for PRN Reason (Other).  Indications: the inability to have an erection acetaminophen-codeine (TYLENOL #3) 300-30 mg per tablet TAKE 1 TABLET BY MOUTH 3 TIMES A DAY AS NEEDED FOR PAIN FOR UP TO 30 DAYS (MAXIMUM 3 TABS PER DAY) (Patient not taking: Reported on 7/26/2022)    hydrocortisone (ANUSOL-HC) 2.5 % rectal cream Insert  into rectum two (2) times a day. Use as needed as directed (Patient not taking: Reported on 7/26/2022)    butenafine (MENTAX) 1 % topical cream Apply  to affected area as needed. Review of Systems:     Constitutional: No fevers, chills, weight loss, fatigue. Skin: No rashes, pruritis, jaundice, ulcerations, erythema. HENT: No headaches, nosebleeds, sinus pressure, rhinorrhea, sore throat. Eyes: No visual changes, blurred vision, eye pain, photophobia, jaundice. Cardiovascular: No chest pain, heart palpitations. Respiratory: No cough, SOB, wheezing, chest discomfort, orthopnea. Gastrointestinal: Neg unless noted otherwise in H&P   Genitourinary: No dysuria, bleeding, discharge, pyuria. Musculoskeletal: No weakness, arthralgias, wasting. Endo: No sweats. Heme: No bruising, easy bleeding. Allergies: As noted. Neurological: Cranial nerves intact. Alert and oriented. Gait not assessed. Psychiatric:  No anxiety, depression, hallucinations. There were no vitals taken for this visit. Physical Assessment:     constitutional: appearance: well developed, well nourished, normal habitus, no deformities, in no acute distress. skin: inspection: no rashes, ulcers, icterus or other lesions; no clubbing or telangiectasias. palpation: no induration or subcutaneos nodules. eyes: inspection: normal conjunctivae and lids; no jaundice pupils: symmetrical, normoreactive to light, normal accommodation and size. ENMT: mouth: normal oral mucosa,lips and gums; good dentition. oropharynx: normal tongue, hard and soft palate; posterior pharynx without erithema, exudate or lesions.    neck: thyroid: normal size, consistency and position; no masses or tenderness. respiratory: effort: normal chest excursion; no intercostal retraction or accessory muscle use. cardiovascular: abdominal aorta: normal size and position; no bruits. palpation: PMI of normal size and position; normal rhythm; no thrill or murmurs. abdominal: abdomen: normal consistency; no tenderness or masses. hernias: no hernias appreciated. liver: normal size and consistency. spleen: not palpable. rectal: hemoccult/guaiac: not performed. musculoskeletal: digits and nails: no clubbing, cyanosis, petechiae or other inflammatory conditions. gait: normal gait and station head and neck: normal range of motion; no pain, crepitation or contracture. spine/ribs/pelvis: normal range of motion; no pain, deformity or contracture. lymphatic: axilae: not palpable. groin: not palpable. neck: within normal limits. other: not palpable. neurologic: cranial nerves: II-XII normal.   psychiatric: judgement/insight: within normal limits. memory: within normal limits for recent and remote events. mood and affect: no evidence of depression, anxiety or agitation. orientation: oriented to time, space and person. Basic Metabolic Profile   No results for input(s): NA, K, CL, CO2, BUN, GLU, CA, MG, PHOS in the last 72 hours. No lab exists for component: CREAT      CBC w/Diff    No results for input(s): WBC, RBC, HGB, HCT, MCV, MCH, MCHC, RDW, PLT, HGBEXT, HCTEXT, PLTEXT in the last 72 hours. No lab exists for component: MPV No results for input(s): GRANS, LYMPH, EOS, PRO, MYELO, METAS, BLAST in the last 72 hours. No lab exists for component: MONO, BASO     Hepatic Function   No results for input(s): ALB, TP, TBILI, AP, AML, LPSE in the last 72 hours. No lab exists for component: DBILI, GPT, SGOT       Tiffany Hinton, [address] [place] [address]  [facility], [address] [place] [address]  [email or web address]

## 2022-07-28 NOTE — ANESTHESIA POSTPROCEDURE EVALUATION
Procedure(s):  COLONOSCOPY with polypectomies. MAC    Anesthesia Post Evaluation      Multimodal analgesia: multimodal analgesia used between 6 hours prior to anesthesia start to PACU discharge  Patient location during evaluation: bedside  Patient participation: complete - patient participated  Level of consciousness: awake  Pain score: 0  Pain management: adequate  Airway patency: patent  Anesthetic complications: no  Cardiovascular status: stable  Respiratory status: acceptable  Hydration status: acceptable  Post anesthesia nausea and vomiting:  controlled  Final Post Anesthesia Temperature Assessment:  Normothermia (36.0-37.5 degrees C)      INITIAL Post-op Vital signs:   Vitals Value Taken Time   /75 07/28/22 1146   Temp     Pulse 78 07/28/22 1148   Resp 17 07/28/22 1148   SpO2 100 % 07/28/22 1148   Vitals shown include unvalidated device data.

## 2022-11-07 ENCOUNTER — OFFICE VISIT (OUTPATIENT)
Dept: ORTHOPEDIC SURGERY | Age: 77
End: 2022-11-07
Payer: MEDICARE

## 2022-11-07 VITALS — OXYGEN SATURATION: 98 % | TEMPERATURE: 96.9 F | WEIGHT: 213.6 LBS | HEART RATE: 67 BPM | BODY MASS INDEX: 30.65 KG/M2

## 2022-11-07 DIAGNOSIS — G89.29 OTHER CHRONIC PAIN: ICD-10-CM

## 2022-11-07 DIAGNOSIS — B02.29 POST HERPETIC NEURALGIA: ICD-10-CM

## 2022-11-07 DIAGNOSIS — H54.8 LEGALLY BLIND: ICD-10-CM

## 2022-11-07 DIAGNOSIS — M48.062 SPINAL STENOSIS OF LUMBAR REGION WITH NEUROGENIC CLAUDICATION: ICD-10-CM

## 2022-11-07 DIAGNOSIS — M25.551 RIGHT HIP PAIN: Primary | ICD-10-CM

## 2022-11-07 DIAGNOSIS — K59.03 DRUG INDUCED CONSTIPATION: ICD-10-CM

## 2022-11-07 DIAGNOSIS — R29.898 WEAKNESS OF BOTH LEGS: ICD-10-CM

## 2022-11-07 DIAGNOSIS — R26.9 GAIT ABNORMALITY: ICD-10-CM

## 2022-11-07 DIAGNOSIS — M47.816 LUMBAR SPONDYLOSIS: ICD-10-CM

## 2022-11-07 PROCEDURE — G8427 DOCREV CUR MEDS BY ELIG CLIN: HCPCS | Performed by: PHYSICAL MEDICINE & REHABILITATION

## 2022-11-07 PROCEDURE — 99213 OFFICE O/P EST LOW 20 MIN: CPT | Performed by: PHYSICAL MEDICINE & REHABILITATION

## 2022-11-07 PROCEDURE — 1101F PT FALLS ASSESS-DOCD LE1/YR: CPT | Performed by: PHYSICAL MEDICINE & REHABILITATION

## 2022-11-07 PROCEDURE — G8432 DEP SCR NOT DOC, RNG: HCPCS | Performed by: PHYSICAL MEDICINE & REHABILITATION

## 2022-11-07 PROCEDURE — G8417 CALC BMI ABV UP PARAM F/U: HCPCS | Performed by: PHYSICAL MEDICINE & REHABILITATION

## 2022-11-07 PROCEDURE — G8536 NO DOC ELDER MAL SCRN: HCPCS | Performed by: PHYSICAL MEDICINE & REHABILITATION

## 2022-11-07 PROCEDURE — 1123F ACP DISCUSS/DSCN MKR DOCD: CPT | Performed by: PHYSICAL MEDICINE & REHABILITATION

## 2022-11-07 RX ORDER — DICLOFENAC SODIUM 10 MG/G
GEL TOPICAL 4 TIMES DAILY
Qty: 5 EACH | Refills: 3 | Status: SHIPPED | OUTPATIENT
Start: 2022-11-07

## 2022-11-07 RX ORDER — POLYETHYLENE GLYCOL 3350 17 G/17G
17 POWDER, FOR SOLUTION ORAL
Qty: 510 G | Refills: 5 | Status: SHIPPED | OUTPATIENT
Start: 2022-11-07

## 2022-11-07 NOTE — PATIENT INSTRUCTIONS
Low Back Arthritis: Exercises  Introduction  Here are some examples of typical rehabilitation exercises for your condition. Start each exercise slowly. Ease off the exercise if you start to have pain. Your doctor or physical therapist will tell you when you can start these exercises and which ones will work best for you. When you are not being active, find a comfortable position for rest. Some people are comfortable on the floor or a medium-firm bed with a small pillow under their head and another under their knees. Some people prefer to lie on their side with a pillow between their knees. Don't stay in one position for too long. Take short walks (10 to 20 minutes) every 2 to 3 hours. Avoid slopes, hills, and stairs until you feel better. Walk only distances you can manage without pain, especially leg pain. How to do the exercises  Pelvic tilt  Lie on your back with your knees bent. \"Brace\" your stomach--tighten your muscles by pulling in and imagining your belly button moving toward your spine. Press your lower back into the floor. You should feel your hips and pelvis rock back. Hold for 6 seconds while breathing smoothly. Relax and allow your pelvis and hips to rock forward. Repeat 8 to 12 times. Back stretches  Get down on your hands and knees on the floor. Relax your head and allow it to droop. Round your back up toward the ceiling until you feel a nice stretch in your upper, middle, and lower back. Hold this stretch for as long as it feels comfortable, or about 15 to 30 seconds. Return to the starting position with a flat back while you are on your hands and knees. Let your back sway by pressing your stomach toward the floor. Lift your buttocks toward the ceiling. Hold this position for 15 to 30 seconds. Repeat 2 to 4 times. Follow-up care is a key part of your treatment and safety. Be sure to make and go to all appointments, and call your doctor if you are having problems.  It's also a good idea to know your test results and keep a list of the medicines you take. Current as of: March 9, 2022               Content Version: 13.4  © 2006-2022 Healthwise, Incorporated. Care instructions adapted under license by [employer] (which disclaims liability or warranty for this information). If you have questions about a medical condition or this instruction, always ask your healthcare professional. [address] any warranty or liability for your use of this information. Learning About the [address] Diet  What is the Mediterranean diet? The Mediterranean diet is a style of eating rather than a diet plan. It features foods eaten in [place], [place], [place] and [place], and other countries along the [place]. It emphasizes eating foods like fish, fruits, vegetables, beans, high-fiber breads and whole grains, nuts, and olive oil. This style of eating includes limited red meat, cheese, and sweets. Why choose the Mediterranean diet? A Mediterranean-style diet may improve heart health. It contains more fat than other heart-healthy diets. But the fats are mainly from nuts, unsaturated oils (such as fish oils and olive oil), and certain nut or seed oils (such as canola, soybean, or flaxseed oil). These fats may help protect the heart and blood vessels. How can you get started on the Mediterranean diet? Here are some things you can do to switch to a more Mediterranean way of eating. What to eat  Eat a variety of fruits and vegetables each day, such as grapes, blueberries, tomatoes, broccoli, peppers, figs, olives, spinach, eggplant, beans, lentils, and chickpeas. Eat a variety of whole-grain foods each day, such as oats, brown rice, and whole wheat bread, pasta, and couscous. Eat fish at least 2 times a week. Try tuna, salmon, mackerel, lake trout, herring, or sardines.   Eat moderate amounts of low-fat dairy products, such as milk, cheese, or yogurt. Eat moderate amounts of poultry and eggs. Choose healthy (unsaturated) fats, such as nuts, olive oil, and certain nut or seed oils like canola, soybean, and flaxseed. Limit unhealthy (saturated) fats, such as butter, palm oil, and coconut oil. And limit fats found in animal products, such as meat and dairy products made with whole milk. Try to eat red meat only a few times a month in very small amounts. Limit sweets and desserts to only a few times a week. This includes sugar-sweetened drinks like soda. The Mediterranean diet may also include red wine with your meal--1 glass each day for women and up to 2 glasses a day for men. Tips for eating at home  Use herbs, spices, garlic, lemon zest, and citrus juice instead of salt to add flavor to foods. Add avocado slices to your sandwich instead of hermosillo. Have fish for lunch or dinner instead of red meat. Brush the fish with olive oil, and broil or grill it. Sprinkle your salad with seeds or nuts instead of cheese. Cook with olive or canola oil instead of butter or oils that are high in saturated fat. Switch from 2% milk or whole milk to 1% or fat-free milk. Dip raw vegetables in a vinaigrette dressing or hummus instead of dips made from mayonnaise or sour cream.  Have a piece of fruit for dessert instead of a piece of cake. Try baked apples, or have some dried fruit. Tips for eating out  Try broiled, grilled, baked, or poached fish instead of having it fried or breaded. Ask your  to have your meals prepared with olive oil instead of butter. Order dishes made with marinara sauce or sauces made from olive oil. Avoid sauces made from cream or mayonnaise. Choose whole-grain breads, whole wheat pasta and pizza crust, brown rice, beans, and lentils. Cut back on butter or margarine on bread. Instead, you can dip your bread in a small amount of olive oil. Ask for a side salad or grilled vegetables instead of french fries or chips.   Where can you learn more? Go to http://www.My[employer]Up.com/  Enter O407 in the search box to learn more about \"Learning About the Mediterranean Diet. \"  Current as of: May 9, 2022               Content Version: 13.4  © 5332-4805 Healthwise, Incorporated. Care instructions adapted under license by [employer] (which disclaims liability or warranty for this information). If you have questions about a medical condition or this instruction, always ask your healthcare professional. [address] any warranty or liability for your use of this information.

## 2022-11-07 NOTE — PROGRESS NOTES
[facility]  [address], [facility] [address]  Phone: [phone]  Fax: [phone]    Pt's YOB: 1945    ASSESSMENT   Diagnoses and all orders for this visit:    1. Right hip pain  -     diclofenac (VOLTAREN) 1 % gel; Apply  to affected area four (4) times daily. Apply to right hip 4 times per day as directed    2. Lumbar spondylosis  -     diclofenac (VOLTAREN) 1 % gel; Apply  to affected area four (4) times daily. Apply to right hip 4 times per day as directed    3. Other chronic pain    4. Drug induced constipation  -     polyethylene glycol (Miralax) 17 gram/dose powder; Take 17 g by mouth daily as needed for Constipation. 5. Spinal stenosis of lumbar region with neurogenic claudication    6. Weakness of both legs    7. Post herpetic neuralgia    8. Legally blind    9. Gait abnormality       IMPRESSION AND PLAN:  Cahd Hartman is a 76 y.o. male with history of chronic lumbar pain and presents to the office today for follow up accompanied by his son who lives with him. Pt continues to complain of chronic lumbar pain with no significant change in his symptoms since his last office visit. He continues to take Miralax, Cymbalta 30 mg 1 cap daily, Topamax 100 mg 1 tab QAM and 2 tabs QHS prescribed by Dr. Agatha Ye, and supplement with vitamin D.    1) Pt was given information on lumbar arthritis exercises and the Mediterranean diet. 2) He will continue taking Cymbalta 30 mg 1 cap daily and Topamax 100 mg 1 tab QAM and 2 tabs QHS prescribed by Dr. Agatha Ye. He does not require refills at this time. 3) Pt received refills of Voltaren 1% gel and Miralax at this time. 4) Mr. Kai Cuevas has a reminder for a \"due or due soon\" health maintenance. I have asked that he contact his primary care provider, Unknown, Provider, MD, for follow-up on this health maintenance. 5)  demonstrated consistency with prescribing.    6) Last UDS from 10/11/2021 was consistent. 7) Home PT had been previously ordered but some difficulty continuing due to in home help  Follow-up and Dispositions    Return in about 4 months (around 3/7/2023) for Medication follow up. HISTORY OF PRESENT ILLNESS:  Urvashi Coley is a 76 y.o. male with history of chronic lumbar pain and presents to the office today for follow up accompanied by his son. Pt continues to complain of chronic lumbar pain with no significant change in his symptoms since his last office visit. He reports his pain is well managed with his medication regimen and use of a rolling walking but notes his symptoms are exacerbated by the colder weather. He also notes he is able to ambulate around the house without any issues, denying loss of balance or dizziness. Pt has discontinued Tylenol #3 1 tab 2-3 x daily and has been using Voltaren 1% gel with benefit. He continues to take Cymbalta 30 mg 1 cap daily, Topamax 100 mg 1 tab QAM and 2 tabs QHS prescribed by Dr. Dimitri Hood, and supplementing with vitamin D. He does have hx of chronic constipation which is relieved with use of Miralax and he needs a refill for this. Pt at this time desires to continue with current care.     Pain Scale: 6/10    PCP: Unknown, Provider, MD     Past Medical History:   Diagnosis Date    ED (erectile dysfunction)     Essential hypertension     Gout     Hypogonadism in male     Hypotestosteronism     Ill-defined condition     ESSENTIAL TREMORS    Legally blind     Nocturia     Nodular prostate without urinary obstruction     Prostate cancer ([address] 2/14/13    PNBx-T2a - Prostatic Volume: 26.7 grams - Jeffery 6 (3 + 3) -  Dr. Funmilayo Sorenson    Retinitis pigmentosa of both eyes     Seasonal allergic reaction     Thyroid disease     Nodule    Tremors of nervous system         Social History     Socioeconomic History    Marital status:      Spouse name: Not on file    Number of children: Not on file    Years of education: Not on file    Highest education level: Not on file   Occupational History    Not on file   Tobacco Use    Smoking status: Never    Smokeless tobacco: Never   Vaping Use    Vaping Use: Never used   Substance and Sexual Activity    Alcohol use: Not Currently     Comment: seldom    Drug use: Never    Sexual activity: Yes   Other Topics Concern    Not on file   Social History Narrative    Not on file     Social Determinants of Health     Financial Resource Strain: Not on file   Food Insecurity: Not on file   Transportation Needs: Not on file   Physical Activity: Not on file   Stress: Not on file   Social Connections: Not on file   Intimate Partner Violence: Not on file   Housing Stability: Not on file       Current Outpatient Medications   Medication Sig Dispense Refill    diclofenac (VOLTAREN) 1 % gel Apply  to affected area four (4) times daily. Apply to right hip 4 times per day as directed 5 Each 3    polyethylene glycol (Miralax) 17 gram/dose powder Take 17 g by mouth daily as needed for Constipation. 510 g 5    cholecalciferol (VITAMIN D3) 25 mcg (1,000 unit) cap Take  by mouth daily. hydrocortisone (ANUSOL-HC) 2.5 % rectal cream Insert  into rectum two (2) times a day. Use as needed as directed 90 g 1    butenafine (MENTAX) 1 % topical cream Apply  to affected area as needed. fluticasone propionate (FLONASE) 50 mcg/actuation nasal spray 2 Sprays by Both Nostrils route daily as needed for Allergies or Rhinitis. DULoxetine (CYMBALTA) 30 mg capsule Take 30 mg by mouth daily. NIFEdipine ER (ADALAT CC) 60 mg ER tablet Take 60 mg by mouth daily. spironolactone (ALDACTONE) 25 mg tablet Take 25 mg by mouth two (2) times a day. topiramate (TOPAMAX) 100 mg tablet Take 100 mg by mouth two (2) times a day.  takes 100 mg in AM , 200 mg every PM for a total of 300 mg per day  Indications: muscle tremors      lidocaine (LIDODERM) 5 % Apply patch to the affected area for 12 hours a day and remove for 12 hours a day. (Patient not taking: Reported on 11/7/2022) 180 Patch 1    alprostadiL (Muse) 500 mcg supp 500 mcg by IntraURETHral route daily as needed for PRN Reason (Other). Indications: the inability to have an erection (Patient not taking: Reported on 11/7/2022) 15 Suppository 1    acetaminophen-codeine (TYLENOL #3) 300-30 mg per tablet TAKE 1 TABLET BY MOUTH 3 TIMES A DAY AS NEEDED FOR PAIN FOR UP TO 30 DAYS (MAXIMUM 3 TABS PER DAY) (Patient not taking: No sig reported)      allopurinol (ZYLOPRIM) 100 mg tablet Take 100 mg by mouth every other day. (Patient not taking: Reported on 11/7/2022)         Allergies   Allergen Reactions    Iodinated Contrast Media Itching    Iodine Hives     Other reaction(s): Unknown    Spironolactone Unknown (comments)     Pt denies         REVIEW OF SYSTEMS    Constitutional: Negative for fever, chills, or weight change. Respiratory: Negative for cough or shortness of breath. Cardiovascular: Negative for chest pain or palpitations. Gastrointestinal: Negative for acid reflux, change in bowel habits, or constipation. Genitourinary: Negative for dysuria and flank pain. Musculoskeletal: Positive for lumbar pain. Skin: Negative for rash. Neurological: Negative for headaches, dizziness, or numbness. Endo/Heme/Allergies: Negative for increased bruising. Psychiatric/Behavioral: Negative for difficulty with sleep. As per [facility]    PHYSICAL EXAMINATION  Visit Vitals  Pulse 67   Temp 96.9 °F (36.1 °C) (Temporal)   Wt 213 lb 9.6 oz (96.9 kg)   SpO2 98%   BMI 30.65 kg/m²       Constitutional: Awake, alert, and in no acute distress. Neurological: 1+ symmetrical DTRs in the upper extremities. 1+ symmetrical DTRs in the lower extremities. Sensation to light touch is intact. Negative Malloy's sign bilaterally. Skin: warm, dry, and intact. Musculoskeletal: Mild - moderate pain with extension, axial loading, and less with forward flexion.  No pain with internal or external rotation of his hips. Negative straight leg raise bilaterally. Pt ambulates today with four wheeled rolling walker. Biceps  Triceps Deltoids Wrist Ext Wrist Flex Hand Intrin   Right +4/5 +4/5 +4/5 +4/5 +4/5 +4/5   Left +4/5 +4/5 +4/5 +4/5 +4/5 +4/5      Hip Flex  Quads Hamstrings Ankle DF EHL Ankle PF   Right -4/5 -4/5 -4/5 -4/5 -4/5 -4/5   Left -4/5 -4/5 -4/5 -4/5 -4/5 -4/5      IMAGING:    Lumbar spine MRI from 07/24/2020 were personally reviewed with the patient and demonstrated:  Results from [place] encounter on 07/24/20   MRI LUMB SPINE W WO CONT   Narrative Sagittal and axial multisequence MR images of lumbar spine were obtained without  and with 20 cc Dotarem gadolinium IV contrast     HISTORY: Back pain and left sciatica. COMPARISON: August 6, 2010     Loss of lumbar lordosis, similar to before. Trace retrolisthesis L5. No focal compression fracture. Patchy fat signal in the bone marrow with no  pathologic marrow signal abnormalities. No abnormal enhancement. Conus  medullaris ends at L1 with normal morphology and signal intensity. Multiple large left renal cysts. L1-L2: Posterior lateral corner disc protrusion. No central stenosis. Mild facet  and ligamentous hypertrophy. Mild foraminal narrowing with no nerve root  compression. L2-L3: Posterior disc bulge and small left paracentral disc protrusion. No  significant central stenosis. Facet and ligamentous hypertrophy. Mild foraminal  narrowing with no nerve root compression. L3-L4: Mild posterior disc bulge. Facet and ligamentous hypertrophy. Posterior  epidural fat present. AP canal measures 9.9 mm  No significant central stenosis. Mild foraminal narrowing with no nerve root compression. L4-L5: Mild posterior disc bulge with left lateral disc protrusion/posterior  lateral annular tear. Facet and ligamentous hypertrophy. No significant central  stenosis.  Mild to moderate foraminal stenosis but no definite exiting nerve root  compression. Possible mild arachnoiditis     L5-S1: Left laminotomy. Mild posterior disc bulge and endplate spondylosis,  slightly worse on the left. No central stenosis. Right facet and ligamentous  hypertrophy. Moderate right foraminal stenosis with compression of the right  exiting L5 nerve root. Mild-to-moderate left foraminal stenosis, mild  compression of left exiting L5 nerve root also possible. Impression IMPRESSION: Degenerative disease worst at L4-L5 and L5-S1, with more severe  foraminal stenosis and nerve root compression at L5-S1. Right slightly worse  than left. However, patient is more symptomatic on the left. Written by Elizabeth Rodgers, as dictated by Natalia Cantu MD.  I, Dr. Natalia Cantu confirm that all documentation is accurate.

## 2023-01-27 ENCOUNTER — HOSPITAL ENCOUNTER (OUTPATIENT)
Dept: LAB | Age: 78
End: 2023-01-27
Payer: MEDICARE

## 2023-01-27 ENCOUNTER — TRANSCRIBE ORDER (OUTPATIENT)
Dept: REGISTRATION | Age: 78
End: 2023-01-27

## 2023-01-27 DIAGNOSIS — G25.0 BENIGN ESSENTIAL TREMOR: ICD-10-CM

## 2023-01-27 DIAGNOSIS — G25.0 BENIGN ESSENTIAL TREMOR: Primary | ICD-10-CM

## 2023-01-27 LAB
ALBUMIN SERPL-MCNC: 3.7 G/DL (ref 3.4–5)
ALBUMIN/GLOB SERPL: 1.2 (ref 0.8–1.7)
ALP SERPL-CCNC: 77 U/L (ref 45–117)
ALT SERPL-CCNC: 19 U/L (ref 16–61)
ANION GAP SERPL CALC-SCNC: 4 MMOL/L (ref 3–18)
AST SERPL-CCNC: 7 U/L (ref 10–38)
BASOPHILS # BLD: 0 K/UL (ref 0–0.1)
BASOPHILS NFR BLD: 1 % (ref 0–2)
BILIRUB DIRECT SERPL-MCNC: <0.1 MG/DL (ref 0–0.2)
BILIRUB SERPL-MCNC: 0.4 MG/DL (ref 0.2–1)
BUN SERPL-MCNC: 18 MG/DL (ref 7–18)
BUN/CREAT SERPL: 13 (ref 12–20)
CALCIUM SERPL-MCNC: 10.2 MG/DL (ref 8.5–10.1)
CHLORIDE SERPL-SCNC: 114 MMOL/L (ref 100–111)
CO2 SERPL-SCNC: 24 MMOL/L (ref 21–32)
CREAT SERPL-MCNC: 1.34 MG/DL (ref 0.6–1.3)
DIFFERENTIAL METHOD BLD: ABNORMAL
EOSINOPHIL # BLD: 0.5 K/UL (ref 0–0.4)
EOSINOPHIL NFR BLD: 8 % (ref 0–5)
ERYTHROCYTE [DISTWIDTH] IN BLOOD BY AUTOMATED COUNT: 13 % (ref 11.6–14.5)
GLOBULIN SER CALC-MCNC: 3.2 G/DL (ref 2–4)
GLUCOSE SERPL-MCNC: 108 MG/DL (ref 74–99)
HCT VFR BLD AUTO: 44.5 % (ref 36–48)
HGB BLD-MCNC: 14.4 G/DL (ref 13–16)
IMM GRANULOCYTES # BLD AUTO: 0 K/UL (ref 0–0.04)
IMM GRANULOCYTES NFR BLD AUTO: 0 % (ref 0–0.5)
LYMPHOCYTES # BLD: 2 K/UL (ref 0.9–3.6)
LYMPHOCYTES NFR BLD: 35 % (ref 21–52)
MCH RBC QN AUTO: 32 PG (ref 24–34)
MCHC RBC AUTO-ENTMCNC: 32.4 G/DL (ref 31–37)
MCV RBC AUTO: 98.9 FL (ref 78–100)
MONOCYTES # BLD: 0.3 K/UL (ref 0.05–1.2)
MONOCYTES NFR BLD: 6 % (ref 3–10)
NEUTS SEG # BLD: 2.9 K/UL (ref 1.8–8)
NEUTS SEG NFR BLD: 51 % (ref 40–73)
NRBC # BLD: 0 K/UL (ref 0–0.01)
NRBC BLD-RTO: 0 PER 100 WBC
PLATELET # BLD AUTO: 327 K/UL (ref 135–420)
PMV BLD AUTO: 9.9 FL (ref 9.2–11.8)
POTASSIUM SERPL-SCNC: 4.4 MMOL/L (ref 3.5–5.5)
PROT SERPL-MCNC: 6.9 G/DL (ref 6.4–8.2)
RBC # BLD AUTO: 4.5 M/UL (ref 4.35–5.65)
SODIUM SERPL-SCNC: 142 MMOL/L (ref 136–145)
WBC # BLD AUTO: 5.7 K/UL (ref 4.6–13.2)

## 2023-01-27 PROCEDURE — 85025 COMPLETE CBC W/AUTO DIFF WBC: CPT

## 2023-01-27 PROCEDURE — 36415 COLL VENOUS BLD VENIPUNCTURE: CPT

## 2023-01-27 PROCEDURE — 80076 HEPATIC FUNCTION PANEL: CPT

## 2023-01-27 PROCEDURE — 80048 BASIC METABOLIC PNL TOTAL CA: CPT

## 2023-04-26 ENCOUNTER — OFFICE VISIT (OUTPATIENT)
Age: 78
End: 2023-04-26
Payer: MEDICARE

## 2023-04-26 VITALS
RESPIRATION RATE: 16 BRPM | WEIGHT: 216 LBS | OXYGEN SATURATION: 98 % | SYSTOLIC BLOOD PRESSURE: 120 MMHG | HEIGHT: 71 IN | HEART RATE: 79 BPM | BODY MASS INDEX: 30.24 KG/M2 | DIASTOLIC BLOOD PRESSURE: 73 MMHG | TEMPERATURE: 98.3 F

## 2023-04-26 DIAGNOSIS — G89.29 OTHER CHRONIC PAIN: ICD-10-CM

## 2023-04-26 DIAGNOSIS — M47.816 SPONDYLOSIS WITHOUT MYELOPATHY OR RADICULOPATHY, LUMBAR REGION: Primary | ICD-10-CM

## 2023-04-26 DIAGNOSIS — M48.062 SPINAL STENOSIS, LUMBAR REGION WITH NEUROGENIC CLAUDICATION: ICD-10-CM

## 2023-04-26 DIAGNOSIS — R26.89 BALANCE PROBLEM: ICD-10-CM

## 2023-04-26 DIAGNOSIS — R26.9 GAIT ABNORMALITY: ICD-10-CM

## 2023-04-26 DIAGNOSIS — R53.1 WEAKNESS: ICD-10-CM

## 2023-04-26 DIAGNOSIS — K59.03 DRUG INDUCED CONSTIPATION: ICD-10-CM

## 2023-04-26 DIAGNOSIS — R29.898 OTHER SYMPTOMS AND SIGNS INVOLVING THE MUSCULOSKELETAL SYSTEM: ICD-10-CM

## 2023-04-26 DIAGNOSIS — H54.8 LEGAL BLINDNESS, AS DEFINED IN USA: ICD-10-CM

## 2023-04-26 PROCEDURE — 99214 OFFICE O/P EST MOD 30 MIN: CPT | Performed by: PHYSICAL MEDICINE & REHABILITATION

## 2023-04-26 PROCEDURE — G8417 CALC BMI ABV UP PARAM F/U: HCPCS | Performed by: PHYSICAL MEDICINE & REHABILITATION

## 2023-04-26 PROCEDURE — 1036F TOBACCO NON-USER: CPT | Performed by: PHYSICAL MEDICINE & REHABILITATION

## 2023-04-26 PROCEDURE — 1123F ACP DISCUSS/DSCN MKR DOCD: CPT | Performed by: PHYSICAL MEDICINE & REHABILITATION

## 2023-04-26 PROCEDURE — G8427 DOCREV CUR MEDS BY ELIG CLIN: HCPCS | Performed by: PHYSICAL MEDICINE & REHABILITATION

## 2023-04-26 NOTE — PROGRESS NOTES
Chief Complaint   Patient presents with    Follow-up       Pt preferred language for health care discussion is english. Is someone accompanying this pt? yes    Is the patient using any DME equipment during OV? yes    Depression Screening:  No flowsheet data found. Learning Assessment:  No flowsheet data found. Abuse Screening:  No flowsheet data found. Fall Risk  No flowsheet data found. Advance Directive:  1. Do you have an advance directive in place? Patient Reply:no    2. If not, would you like material regarding how to put one in place? Patient Reply: no    2. Per patient no changes to their ACP contact no. Coordination of Care:  1. Have you been to the ER, urgent care clinic since your last visit? Hospitalized since your last visit? no    2. Have you seen or consulted any other health care providers outside of the [address] [place] [address] since your last visit? Include any pap smears or colon screening.  no

## 2023-04-26 NOTE — PROGRESS NOTES
[facility]  [address] [place] [address], [facility] [address]  Phone: [phone]  Fax: [phone]    Pt's YOB: 1945    Lesley Adan was seen today for follow-up. Diagnoses and all orders for this visit:    Spondylosis without myelopathy or radiculopathy, lumbar region  -     [address], [place]    Spinal stenosis, lumbar region with neurogenic claudication  -     [address], [place]    Gait abnormality  -     [address], [place]    Weakness  -     [address], [place]    Balance problem  -     [address], [place]    Other chronic pain  -     [address], [place]    Drug induced constipation    Other symptoms and signs involving the musculoskeletal system  -     [address], [place]    Legal blindness, as defined in [place]  -     [address], [place]         IMPRESSION AND PLAN:    Zachary Patterson is a 76 y.o. male with history of chronic lumbar pain and presents to the office today for follow up accompanied by his son who lives with him. Pt continues to complain of chronic  lumbar pain with no significant change in his symptoms since his last office visit.  He continues to take Miralax, Cymbalta 30 mg 1 cap daily, Topamax 100 mg 1 tab QAM and 2 tabs QHS prescribed by Dr. Quynh Eaton, and supplement with vitamin D.    1) Medication discussed extensively -- he defers on any other medication at this time ; he has some left over Tylenol #3 which he uses on rare occasions  2) Encouraged in home physical therapy for strengthening, balance and walking-- pt agrees to this for safety and strengthening    3) He will continue taking Cymbalta 30 mg 1 cap daily and Topamax 100 mg 1 tab QAM and 2 tabs QHS prescribed by

## 2023-04-27 ENCOUNTER — HOME HEALTH ADMISSION (OUTPATIENT)
Age: 78
End: 2023-04-27
Payer: MEDICARE

## 2023-04-28 ENCOUNTER — HOME CARE VISIT (OUTPATIENT)
Age: 78
End: 2023-04-28

## 2023-04-28 VITALS
DIASTOLIC BLOOD PRESSURE: 85 MMHG | HEART RATE: 82 BPM | TEMPERATURE: 97.6 F | OXYGEN SATURATION: 100 % | SYSTOLIC BLOOD PRESSURE: 130 MMHG | RESPIRATION RATE: 18 BRPM

## 2023-04-28 PROCEDURE — G0151 HHCP-SERV OF PT,EA 15 MIN: HCPCS

## 2023-04-28 PROCEDURE — 0221000100 HH NO PAY CLAIM PROCEDURE

## 2023-04-28 ASSESSMENT — ENCOUNTER SYMPTOMS
DYSPNEA ACTIVITY LEVEL: AFTER AMBULATING MORE THAN 20 FT
PAIN LOCATION - PAIN QUALITY: ACHING

## 2023-04-28 NOTE — HOME HEALTH
narcotics performed (specify) none     Dr. Carlton Bishop notified of any discrepancies/look a like medications/medication interactions no severe interaction noted . Home health supplies by type and quantity ordered/delivered this visit include: none     Patient education provided this visit to include: HEP, fall prevention, dc planning     Patient level of understanding of education provided: Patient was able to partially teach back HEP     Sharps Education Provided: n/a  Patient response to procedure performed:  Patient needed verbal cues for HEP     Home exercise program/Homework provided: patient educated with HEP including seated hip flex, knee extension, hip abduction, hip adduction, ankle PF and DF and ball squeeze x 20 reps x 3 sets daily to improve MMT on BLE to facilitate with improved bed mobility, transfers and gait with AD. patient also educated with deep breathing exercises to be done daily x 10 reps x 3 sets to prevent SOB during activity. Patient educated with fall prevention technique by decluttering space, proper use of AD and footwear    Pt/Caregiver instructed on plan of care and are agreeable to plan of care at this time. Physician Carlton Bishop MD notified of patient admission to home health and plan of care including anticipated frequency of 1w1 2w4 for PT     Discharge planning discussed with patient and caregiver. Discharge planning as follows: dc when all goals are met. Pt/Caregiver did verbalize understanding of discharge planning. Next MD appointmentTBD. Patient/caregiver encouraged/instructed to keep appointment as lack of follow through with physician appointment could result in discontinuation of home care services for non-compliance.     PMHx: Spondylosis without myelopathy or radiculopathy, lumbar region      Spinal stenosis, lumbar region with neurogenic claudication      Gait abnormality      Weakness      Balance problem      Other chronic pain      Drug induced

## 2023-05-01 ENCOUNTER — HOME CARE VISIT (OUTPATIENT)
Age: 78
End: 2023-05-01

## 2023-05-05 ENCOUNTER — HOME CARE VISIT (OUTPATIENT)
Age: 78
End: 2023-05-05

## 2023-05-05 PROCEDURE — G0157 HHC PT ASSISTANT EA 15: HCPCS

## 2023-05-05 ASSESSMENT — ENCOUNTER SYMPTOMS: PAIN LOCATION - PAIN QUALITY: SORE

## 2023-05-07 VITALS
OXYGEN SATURATION: 96 % | TEMPERATURE: 97.6 F | RESPIRATION RATE: 18 BRPM | DIASTOLIC BLOOD PRESSURE: 78 MMHG | SYSTOLIC BLOOD PRESSURE: 118 MMHG | HEART RATE: 78 BPM

## 2023-05-08 ENCOUNTER — HOME CARE VISIT (OUTPATIENT)
Age: 78
End: 2023-05-08
Payer: MEDICARE

## 2023-05-08 PROCEDURE — G0157 HHC PT ASSISTANT EA 15: HCPCS

## 2023-05-09 VITALS
DIASTOLIC BLOOD PRESSURE: 81 MMHG | TEMPERATURE: 97.3 F | HEART RATE: 72 BPM | RESPIRATION RATE: 18 BRPM | OXYGEN SATURATION: 97 % | SYSTOLIC BLOOD PRESSURE: 133 MMHG

## 2023-05-09 ASSESSMENT — ENCOUNTER SYMPTOMS: PAIN LOCATION - PAIN QUALITY: SORE

## 2023-05-09 NOTE — HOME HEALTH
SUBJECTIVE: Patient observed ambulating with can upon arrival for PT. No falls were reported by pt/CG. CAREGIVER INVOLVEMENT/ASSISTANCE NEEDED FOR: Patient resides with family who assist with ADL's and transfers as needed. HOME HEALTH SUPPLIES BY TYPE AND QUANTITY ORDERED/DELIVERED THIS VISIT INCLUDE: none   OBJECTIVE: See interventions. PATIENT RESPONSE TO TREATMENT: Patient required frequent rest breaks throughout activities due to fatigue and weakness in LE's. PATIENT LEVEL OF UNDERSTANDING OF EDUCATION PROVIDED: Patient/CG required reinforcement on safety with transfers, use of AD at all times, HEP 2-3x/day, increasing activity throughout day, fall prevention techniques, energy conservation. ASSESSMENT OF PROGRESS TOWARD GOALS: Patient is progressing towards goals. Patient demonstrated thera ex in sitting/standing with ~50% verbal cueing for proper technique. Patient transferred sit<->stand with verbal cues for UE placement and to proper positioning. Patient ambulated throughout home with verbal cueing for upward gaze, posturing and proper technique. CONTINUED NEED FOR THE FOLLOWING SKILLS: Continuation of PT to further improve patient balance, functional mobility and strength to decrease pts risk for falls. PLAN FOR NEXT VISIT: Progress with balance activities in standing next visit. THE FOLLOWING DISCHARGE PLANNING WAS DISCUSSED WITH THE PATIENT/CAREGIVER: D/C from HHPT when goals are met or max potential benefit achieved.

## 2023-05-10 ENCOUNTER — HOME CARE VISIT (OUTPATIENT)
Age: 78
End: 2023-05-10
Payer: MEDICARE

## 2023-05-10 PROCEDURE — G0157 HHC PT ASSISTANT EA 15: HCPCS

## 2023-05-11 VITALS
TEMPERATURE: 97.5 F | OXYGEN SATURATION: 99 % | DIASTOLIC BLOOD PRESSURE: 78 MMHG | SYSTOLIC BLOOD PRESSURE: 133 MMHG | HEART RATE: 70 BPM | RESPIRATION RATE: 18 BRPM

## 2023-05-11 ASSESSMENT — ENCOUNTER SYMPTOMS: PAIN LOCATION - PAIN QUALITY: SORE

## 2023-05-15 ENCOUNTER — HOME CARE VISIT (OUTPATIENT)
Age: 78
End: 2023-05-15
Payer: MEDICARE

## 2023-05-15 VITALS
OXYGEN SATURATION: 97 % | RESPIRATION RATE: 18 BRPM | SYSTOLIC BLOOD PRESSURE: 138 MMHG | DIASTOLIC BLOOD PRESSURE: 89 MMHG | TEMPERATURE: 97.5 F | HEART RATE: 72 BPM

## 2023-05-15 PROCEDURE — G0157 HHC PT ASSISTANT EA 15: HCPCS

## 2023-05-15 ASSESSMENT — ENCOUNTER SYMPTOMS: PAIN LOCATION - PAIN QUALITY: SHARP

## 2023-05-16 NOTE — HOME HEALTH
SUBJECTIVE: No falls per pt/CG. Patient reports with increase back pain today. CAREGIVER INVOLVEMENT/ASSISTANCE NEEDED FOR: Patient resides with family who assist with ADL's and transfers as needed. HOME HEALTH SUPPLIES BY TYPE AND QUANTITY ORDERED/DELIVERED THIS VISIT INCLUDE: none   OBJECTIVE: See interventions. PATIENT RESPONSE TO TREATMENT: Patient required occasional rest breaks throughout activities due to fatigue and weakness in LE's. Patient with increase pain to 8/10 in back during activities. PATIENT LEVEL OF UNDERSTANDING OF EDUCATION PROVIDED: Patient/CG required reinforcement on safety with transfers, use of AD at all times, HEP 2-3x/day, increasing activity throughout day, fall prevention techniques, energy conservation. ASSESSMENT OF PROGRESS TOWARD GOALS: Patient is progressing towards goals, however, reports with increase back pain during activities. Patient performed FTSTS ~14 seconds with UE use. Patient demonstrated sitting and standing thera ex with verbal cueing for proper technique and occasional ~1-2 minute rest break due to fatigue. Patient would benefit from further focus on balance activities, gait training and thera ex to improve functional mobility and strength of LE's. CONTINUED NEED FOR THE FOLLOWING SKILLS: Continuation of PT to further improve patient balance, functional mobility and strength to decrease pts risk for falls. PLAN FOR NEXT VISIT: Progress with gait training on outdoor surfaces with walker next visit. THE FOLLOWING DISCHARGE PLANNING WAS DISCUSSED WITH THE PATIENT/CAREGIVER: D/C from HHPT when goals are met or max potential benefit achieved.

## 2023-05-19 ENCOUNTER — HOME CARE VISIT (OUTPATIENT)
Age: 78
End: 2023-05-19
Payer: MEDICARE

## 2023-05-19 ENCOUNTER — TELEPHONE (OUTPATIENT)
Age: 78
End: 2023-05-19

## 2023-05-19 VITALS
DIASTOLIC BLOOD PRESSURE: 78 MMHG | HEART RATE: 65 BPM | OXYGEN SATURATION: 97 % | RESPIRATION RATE: 18 BRPM | TEMPERATURE: 97.8 F | SYSTOLIC BLOOD PRESSURE: 117 MMHG

## 2023-05-19 PROCEDURE — G0151 HHCP-SERV OF PT,EA 15 MIN: HCPCS

## 2023-05-19 ASSESSMENT — ENCOUNTER SYMPTOMS: PAIN LOCATION - PAIN QUALITY: DULL

## 2023-05-19 NOTE — HOME HEALTH
SUBJECTIVE: Patient has no complaints. He is agreeable to the visit. CAREGIVER INVOLVEMENT/ASSISTANCE NEEDED FOR: Supervision, Meal prep, ADLs, transportation, care coordination. HOME HEALTH SUPPLIES BY TYPE AND QUANTITY ORDERED/DELIVERED THIS VISIT INCLUDE: n/a  OBJECTIVE:  See interventions. Resting tremors, not parkinson's, worse at night, legally blind. 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 PATIENT RESPONSE TO TREATMENT:  Patient was able to participate easily throughout the visit. He denies change in pain. He demonstrates good awareness of his surroundings and is demonstrating progress toward all goals. He is in agreement to extending PT for core strengthening, balance and stretches. PATIENT LEVEL OF UNDERSTANDING OF EDUCATION PROVIDED: Extend for core stability and stretches (HS), balance, gait car transfer and bed mob. Hamstrings stretch in supine. ASSESSMENT OF PROGRESS TOWARD GOALS: Patient is making progress toward all goals. He is close to meeting several goals. Bed mobility: he struggles to lift legs onto bed and to scoot up, SBA; BLE strength:  R hip 3/5, L hip 3+/5, Knees 4/5; Transfers:  need to address car transfers. Back pain is one of the limiting factors in his mobility, and there is noted R > L hamstring muscle tightness. He is able to control back pain to a 4/10, and the pain may spike to 7/10, goal is to achieve 4/10, therefore he is progressing toward pain control goal.  Due to the patient's impaired vision, his balance and strength need to be imporved to max potential for safety, and strength through the LEs and core with stretches to hips, knees and trunk, are needed in order to improve his fluidity of movement, his postural control and will increase his safety in the home and help to reduce pain.   Tinetti balance test indicates medium fall risk: 22/28, that is improved from 10/28, high fall risk, and is close to meeting

## 2023-05-22 ENCOUNTER — HOME CARE VISIT (OUTPATIENT)
Age: 78
End: 2023-05-22
Payer: MEDICARE

## 2023-05-22 PROCEDURE — G0157 HHC PT ASSISTANT EA 15: HCPCS

## 2023-05-22 ASSESSMENT — ENCOUNTER SYMPTOMS: PAIN LOCATION - PAIN QUALITY: SORE

## 2023-05-23 VITALS
RESPIRATION RATE: 18 BRPM | TEMPERATURE: 97.3 F | SYSTOLIC BLOOD PRESSURE: 133 MMHG | DIASTOLIC BLOOD PRESSURE: 80 MMHG | HEART RATE: 67 BPM | OXYGEN SATURATION: 96 %

## 2023-05-24 ENCOUNTER — TELEPHONE (OUTPATIENT)
Age: 78
End: 2023-05-24

## 2023-05-24 DIAGNOSIS — R53.1 WEAKNESS: ICD-10-CM

## 2023-05-24 DIAGNOSIS — M48.062 SPINAL STENOSIS, LUMBAR REGION WITH NEUROGENIC CLAUDICATION: Primary | ICD-10-CM

## 2023-05-24 DIAGNOSIS — R26.89 BALANCE PROBLEM: ICD-10-CM

## 2023-05-24 NOTE — HOME HEALTH
SUBJECTIVE: No falls and no changes in medication per pt. CAREGIVER INVOLVEMENT/ASSISTANCE NEEDED FOR: Patient resides with family who assist with ADL's and transfers as needed. HOME HEALTH SUPPLIES BY TYPE AND QUANTITY ORDERED/DELIVERED THIS VISIT INCLUDE: none   OBJECTIVE: See interventions. PATIENT RESPONSE TO TREATMENT: Patient required occasional rest breaks throughout activities due to fatigue and weakness in LE's. PATIENT LEVEL OF UNDERSTANDING OF EDUCATION PROVIDED: Patient/CG required reinforcement on safety with transfers, use of AD at all times, HEP 2-3x/day, increasing activity throughout day, fall prevention techniques, energy conservation. ASSESSMENT OF PROGRESS TOWARD GOALS: Patient is progressing towards goals. Patient ambulated on indoor/outdoor surfaces increase distances with verbal cueing for posturing and to stay within walker. Patient transferred sit<->stand with verbal cueing for UE placement. Patient demonstrated thera ex with occasional rest breaks and increase back pain to 8/10. Patient would benefit from further focus on balance, strengthening and functional mobility of LE's. CONTINUED NEED FOR THE FOLLOWING SKILLS: Continuation of PT to further improve patient balance, functional mobility and strength to decrease pts risk for falls. PLAN FOR NEXT VISIT: Bed mobility and core stability exercises next visit. THE FOLLOWING DISCHARGE PLANNING WAS DISCUSSED WITH THE PATIENT/CAREGIVER: D/C from HHPT when goals are met or max potential benefit achieved.

## 2023-05-26 ENCOUNTER — HOME CARE VISIT (OUTPATIENT)
Age: 78
End: 2023-05-26
Payer: MEDICARE

## 2023-05-26 PROCEDURE — G0157 HHC PT ASSISTANT EA 15: HCPCS

## 2023-05-30 VITALS
RESPIRATION RATE: 18 BRPM | HEART RATE: 68 BPM | DIASTOLIC BLOOD PRESSURE: 75 MMHG | TEMPERATURE: 97.5 F | SYSTOLIC BLOOD PRESSURE: 132 MMHG | OXYGEN SATURATION: 96 %

## 2023-05-30 ASSESSMENT — ENCOUNTER SYMPTOMS: PAIN LOCATION - PAIN QUALITY: SORE

## 2023-05-30 NOTE — HOME HEALTH
SUBJECTIVE: No falls and no changes in medication per pt. CAREGIVER INVOLVEMENT/ASSISTANCE NEEDED FOR: Patient resides with family who assist with ADL's and transfers as needed. HOME HEALTH SUPPLIES BY TYPE AND QUANTITY ORDERED/DELIVERED THIS VISIT INCLUDE: none   OBJECTIVE: See interventions. PATIENT RESPONSE TO TREATMENT: Patient required 2x ~1 minute rest breaks throughout gait training due to fatigue/weakness in LE's. PATIENT LEVEL OF UNDERSTANDING OF EDUCATION PROVIDED: Patient/CG required reinforcement on safety with transfers, use of AD at all times, HEP 2-3x/day, increasing activity throughout day, fall prevention techniques, energy conservation. ASSESSMENT OF PROGRESS TOWARD GOALS: Patient is progressing towards goals. Patient ambulated increase distances on indoor/outdoor surfaces with verbal cueing for posture and to stay within walker. Patient presents with decreased heel strike and forward lean. Patient required 2x rest breaks throughout gait training. Patient demonstrated thera ex in standing and sitting with verbal cueing for proper technique. CONTINUED NEED FOR THE FOLLOWING SKILLS: Continuation of PT to further improve patient balance, functional mobility and strength to decrease pts risk for falls. PLAN FOR NEXT VISIT: Bed mobility thera ex next visit. THE FOLLOWING DISCHARGE PLANNING WAS DISCUSSED WITH THE PATIENT/CAREGIVER: D/C from HHPT when goals are met or max potential benefit achieved.

## 2023-05-31 ENCOUNTER — HOME CARE VISIT (OUTPATIENT)
Age: 78
End: 2023-05-31
Payer: MEDICARE

## 2023-05-31 PROCEDURE — G0157 HHC PT ASSISTANT EA 15: HCPCS

## 2023-06-02 ASSESSMENT — ENCOUNTER SYMPTOMS: PAIN LOCATION - PAIN QUALITY: SORE

## 2023-06-02 NOTE — HOME HEALTH
SUBJECTIVE: Patient notes he fell over the weekend due left LE giving way. He was able to get himself off from the floor with no injuries. CAREGIVER INVOLVEMENT/ASSISTANCE NEEDED FOR: Patient resides with family who assist with ADL's and transfers as needed. HOME HEALTH SUPPLIES BY TYPE AND QUANTITY ORDERED/DELIVERED THIS VISIT INCLUDE: none   OBJECTIVE: See interventions. PATIENT RESPONSE TO TREATMENT: Patient required occasional rest breaks throughout activities due to fatigue/weakness in LE's. PATIENT LEVEL OF UNDERSTANDING OF EDUCATION PROVIDED: Patient/CG required reinforcement on safety with transfers, use of AD at all times, HEP 2-3x/day, increasing activity throughout day, fall prevention techniques, energy conservation. ASSESSMENT OF PROGRESS TOWARD GOALS: Patient is progressing towards goals. Patient demonstrated supine thera ex to improve core stability and strength. Patient with verbal cueing for proper technique with transfers from sit<->supine and sit<->stand. Patient with occasional pain to left LE during standing activities and gait training. CONTINUED NEED FOR THE FOLLOWING SKILLS: Continuation of PT to further improve patient balance, functional mobility and strength to decrease pts risk for falls. PLAN FOR NEXT VISIT: PFA next visit. THE FOLLOWING DISCHARGE PLANNING WAS DISCUSSED WITH THE PATIENT/CAREGIVER: D/C from HHPT when goals are met or max potential benefit achieved.

## 2023-06-04 VITALS
SYSTOLIC BLOOD PRESSURE: 133 MMHG | TEMPERATURE: 78 F | HEART RATE: 72 BPM | OXYGEN SATURATION: 97 % | DIASTOLIC BLOOD PRESSURE: 78 MMHG | RESPIRATION RATE: 18 BRPM

## 2023-06-05 ENCOUNTER — HOME CARE VISIT (OUTPATIENT)
Age: 78
End: 2023-06-05
Payer: MEDICARE

## 2023-06-05 VITALS
TEMPERATURE: 97.8 F | HEART RATE: 67 BPM | SYSTOLIC BLOOD PRESSURE: 117 MMHG | DIASTOLIC BLOOD PRESSURE: 75 MMHG | OXYGEN SATURATION: 96 % | RESPIRATION RATE: 18 BRPM

## 2023-06-05 PROCEDURE — G0157 HHC PT ASSISTANT EA 15: HCPCS

## 2023-06-06 NOTE — HOME HEALTH
SUBJECTIVE: No falls and no changes in medication per pt. CAREGIVER INVOLVEMENT/ASSISTANCE NEEDED FOR: Patient resides with family who assist with ADL's and transfers as needed. HOME HEALTH SUPPLIES BY TYPE AND QUANTITY ORDERED/DELIVERED THIS VISIT INCLUDE: none   OBJECTIVE: See interventions. PATIENT RESPONSE TO TREATMENT: Patient required occasional rest breaks throughout activities due to fatigue/weakness in LE's. PATIENT LEVEL OF UNDERSTANDING OF EDUCATION PROVIDED: Patient/CG required reinforcement on safety with transfers, use of AD at all times, HEP 2-3x/day, increasing activity throughout day, fall prevention techniques, energy conservation. ASSESSMENT OF PROGRESS TOWARD GOALS: Patient is progressing towards goals. Patient ambulated increase distances on indoor/outdoor surfaces with 2x ~1 minute rest breaks throughout due to fatigue/weakness in LE's. Patient transferred from sit<->stand with verbal cueing for UE placement. Patient demonstrated core stability exercises in supine with ~50% verbal cueing for proper technique. CONTINUED NEED FOR THE FOLLOWING SKILLS: Continuation of PT to further improve patient balance, functional mobility and strength to decrease pts risk for falls. PLAN FOR NEXT VISIT: Balance activities in standing. THE FOLLOWING DISCHARGE PLANNING WAS DISCUSSED WITH THE PATIENT/CAREGIVER: D/C from HHPT when goals are met or max potential benefit achieved.

## 2023-06-07 ASSESSMENT — ENCOUNTER SYMPTOMS: PAIN LOCATION - PAIN QUALITY: SORE

## 2023-06-16 ENCOUNTER — HOME CARE VISIT (OUTPATIENT)
Age: 78
End: 2023-06-16
Payer: MEDICARE

## 2023-06-16 PROCEDURE — G0157 HHC PT ASSISTANT EA 15: HCPCS

## 2023-06-19 VITALS
DIASTOLIC BLOOD PRESSURE: 79 MMHG | TEMPERATURE: 97 F | RESPIRATION RATE: 18 BRPM | HEART RATE: 71 BPM | OXYGEN SATURATION: 96 % | SYSTOLIC BLOOD PRESSURE: 119 MMHG

## 2023-06-19 ASSESSMENT — ENCOUNTER SYMPTOMS: PAIN LOCATION - PAIN QUALITY: SORE

## 2023-06-20 NOTE — HOME HEALTH
SUBJECTIVE: No falls and no changes in medication per pt. CAREGIVER INVOLVEMENT/ASSISTANCE NEEDED FOR: Patient resides with family who assist with ADL's and transfers as needed. HOME HEALTH SUPPLIES BY TYPE AND QUANTITY ORDERED/DELIVERED THIS VISIT INCLUDE: none   OBJECTIVE: See interventions. PATIENT RESPONSE TO TREATMENT: Patient required 2x ~1 minute rest break during standing thera ex due to fatigue. PATIENT LEVEL OF UNDERSTANDING OF EDUCATION PROVIDED: Patient/CG required reinforcement on safety with transfers, use of AD at all times, HEP 2-3x/day, increasing activity throughout day, fall prevention techniques, D/C instructions and energy conservation. ASSESSMENT OF PROGRESS TOWARD GOALS: Patient is progressing towards goals. Patient ambulated throughout home with cane and verbal cueing for posture and proper technique. Patient demonstrated thera ex in sitting and standing with decrease verbal cueing for technique. Patient required occasional rest breaks during standing thera ex due to fatigue and weakness in LE's. CONTINUED NEED FOR THE FOLLOWING SKILLS: none  PLAN FOR NEXT VISIT:  D/C  THE FOLLOWING DISCHARGE PLANNING WAS DISCUSSED WITH THE PATIENT/CAREGIVER: D/C from PT next visit due to goals met.

## 2023-06-23 ENCOUNTER — HOME CARE VISIT (OUTPATIENT)
Age: 78
End: 2023-06-23
Payer: MEDICARE

## 2023-06-23 VITALS
RESPIRATION RATE: 12 BRPM | DIASTOLIC BLOOD PRESSURE: 76 MMHG | HEART RATE: 69 BPM | OXYGEN SATURATION: 98 % | SYSTOLIC BLOOD PRESSURE: 122 MMHG | TEMPERATURE: 98 F

## 2023-06-23 PROCEDURE — G0151 HHCP-SERV OF PT,EA 15 MIN: HCPCS

## 2023-06-23 ASSESSMENT — ENCOUNTER SYMPTOMS: PAIN LOCATION - PAIN QUALITY: ACHY

## 2023-08-24 NOTE — PROGRESS NOTES
[facility]  [address] [place] [address], [address] [facility], [address] [place] [address] Dr  Phone: [phone]  Fax: [phone]    Pt's YOB: 1945    Phani Terrell was seen today for lower back pain. Diagnoses and all orders for this visit:    Spinal stenosis, lumbar region with neurogenic claudication  -     AMB POC XRAY, SPINE, LUMBOSACRAL; 2 O    Spondylosis without myelopathy or radiculopathy, lumbar region  -     AMB POC XRAY, SPINE, LUMBOSACRAL; 2 O    Thoracic spine pain  -     AMB POC XRAY, SPINE; THORACIC, 2 VIEW    Chronic pain of both knees  -     diclofenac sodium (VOLTAREN) 1 % GEL; Apply topically 4 times daily Apply to knees qid as needed for pain    Balance problem    Gait abnormality    Drug induced constipation    Legal blindness, as defined in [address]:  Melisa Carroll is a 76 y.o. male with history of chronic lumbar pain and presents to the office today for PT and medication follow up, he is accompanied by his son. Pt continues to complain of chronic lumbar and  thoracic pain with no significant change in his symptoms since his last office visit. Pt reports undergoing physical therapy with relief. He is taking Cymbalta 30 mg 1 cap daily, Topamax 100 mg 1 tab QAM and 2 tabs QHS, as prescribed by Dr. Louis Courtney, uses Voltaren 1% gel and supplements with vitamin D.     1) Lumbar and thoracic 2V x-ray's were obtained in the office today and reviewed with the patient and his son. 2) Pt is using Voltaren 1% gel 4g 4 times topically as needed for his knees and received refills at this time. 3) Mr. Kole Gambino has a reminder for a \"due or due soon\" health maintenance. I have asked that he contact his primary care provider, Devi Mackenzie MD, for follow-up on this health maintenance. 4)  demonstrated consistency with prescribing.    5) Pt will continue with HEP from home PT     Return in about 4 months (around 12/28/2023) for Medication

## 2023-08-28 ENCOUNTER — OFFICE VISIT (OUTPATIENT)
Age: 78
End: 2023-08-28
Payer: MEDICARE

## 2023-08-28 VITALS
OXYGEN SATURATION: 96 % | TEMPERATURE: 96.8 F | WEIGHT: 226 LBS | HEIGHT: 71 IN | BODY MASS INDEX: 31.64 KG/M2 | HEART RATE: 74 BPM

## 2023-08-28 DIAGNOSIS — M54.6 THORACIC SPINE PAIN: ICD-10-CM

## 2023-08-28 DIAGNOSIS — R26.9 GAIT ABNORMALITY: ICD-10-CM

## 2023-08-28 DIAGNOSIS — M25.561 CHRONIC PAIN OF BOTH KNEES: ICD-10-CM

## 2023-08-28 DIAGNOSIS — H54.8 LEGAL BLINDNESS, AS DEFINED IN USA: ICD-10-CM

## 2023-08-28 DIAGNOSIS — M47.816 SPONDYLOSIS WITHOUT MYELOPATHY OR RADICULOPATHY, LUMBAR REGION: ICD-10-CM

## 2023-08-28 DIAGNOSIS — K59.03 DRUG INDUCED CONSTIPATION: ICD-10-CM

## 2023-08-28 DIAGNOSIS — M48.062 SPINAL STENOSIS, LUMBAR REGION WITH NEUROGENIC CLAUDICATION: Primary | ICD-10-CM

## 2023-08-28 DIAGNOSIS — G89.29 CHRONIC PAIN OF BOTH KNEES: ICD-10-CM

## 2023-08-28 DIAGNOSIS — M25.562 CHRONIC PAIN OF BOTH KNEES: ICD-10-CM

## 2023-08-28 DIAGNOSIS — R26.89 BALANCE PROBLEM: ICD-10-CM

## 2023-08-28 PROBLEM — R63.4 ABNORMAL WEIGHT LOSS: Status: ACTIVE | Noted: 2023-08-28

## 2023-08-28 PROBLEM — I51.7 LEFT VENTRICULAR HYPERTROPHY: Status: ACTIVE | Noted: 2023-08-28

## 2023-08-28 PROBLEM — R26.2 DISABILITY OF WALKING: Status: ACTIVE | Noted: 2023-08-28

## 2023-08-28 PROBLEM — M54.50 LOW BACK PAIN: Status: ACTIVE | Noted: 2023-08-28

## 2023-08-28 PROBLEM — H35.52 RETINITIS PIGMENTOSA: Status: ACTIVE | Noted: 2023-08-28

## 2023-08-28 PROBLEM — H90.3 SENSORINEURAL HEARING LOSS, BILATERAL: Status: ACTIVE | Noted: 2023-08-28

## 2023-08-28 PROBLEM — H35.52 PIGMENTARY RETINAL DYSTROPHY: Status: ACTIVE | Noted: 2023-08-28

## 2023-08-28 PROBLEM — I51.89 DIASTOLIC DYSFUNCTION: Status: ACTIVE | Noted: 2023-08-28

## 2023-08-28 PROCEDURE — G8427 DOCREV CUR MEDS BY ELIG CLIN: HCPCS | Performed by: PHYSICAL MEDICINE & REHABILITATION

## 2023-08-28 PROCEDURE — G8417 CALC BMI ABV UP PARAM F/U: HCPCS | Performed by: PHYSICAL MEDICINE & REHABILITATION

## 2023-08-28 PROCEDURE — 72100 X-RAY EXAM L-S SPINE 2/3 VWS: CPT | Performed by: PHYSICAL MEDICINE & REHABILITATION

## 2023-08-28 PROCEDURE — 72070 X-RAY EXAM THORAC SPINE 2VWS: CPT | Performed by: PHYSICAL MEDICINE & REHABILITATION

## 2023-08-28 PROCEDURE — 1036F TOBACCO NON-USER: CPT | Performed by: PHYSICAL MEDICINE & REHABILITATION

## 2023-08-28 PROCEDURE — 99214 OFFICE O/P EST MOD 30 MIN: CPT | Performed by: PHYSICAL MEDICINE & REHABILITATION

## 2023-08-28 PROCEDURE — 1123F ACP DISCUSS/DSCN MKR DOCD: CPT | Performed by: PHYSICAL MEDICINE & REHABILITATION

## 2023-08-28 RX ORDER — LORATADINE 10 MG/1
TABLET ORAL
COMMUNITY
Start: 2023-08-09

## 2023-08-28 RX ORDER — NIFEDIPINE 60 MG/1
TABLET, EXTENDED RELEASE ORAL
COMMUNITY
Start: 2023-06-12

## 2023-08-28 NOTE — PROGRESS NOTES
Rica Mcnulty presents today for   Chief Complaint   Patient presents with    Lower Back Pain       Is someone accompanying this pt? Yes, son    Is the patient using any DME equipment during [address]? Yes, walker      Coordination of Care:  1. Have you been to the ER, urgent care clinic since your last visit? no  Hospitalized since your last visit? no    2. Have you seen or consulted any other health care providers outside of the [address] since your last visit? no Include any pap smears or colon screening.  no

## 2023-12-11 ENCOUNTER — HOSPITAL ENCOUNTER (OUTPATIENT)
Facility: HOSPITAL | Age: 78
Discharge: HOME | End: 2023-12-14
Payer: MEDICARE

## 2023-12-11 DIAGNOSIS — G25.0 FAMILIAL TREMOR: ICD-10-CM

## 2023-12-11 LAB
ALBUMIN SERPL-MCNC: 3.5 G/DL (ref 3.4–5)
ALBUMIN/GLOB SERPL: 1.1 (ref 0.8–1.7)
ALP SERPL-CCNC: 86 U/L (ref 45–117)
ALT SERPL-CCNC: 21 U/L (ref 16–61)
ANION GAP SERPL CALC-SCNC: 3 MMOL/L (ref 3–18)
AST SERPL-CCNC: 11 U/L (ref 10–38)
BASOPHILS # BLD: 0 K/UL (ref 0–0.1)
BASOPHILS NFR BLD: 1 % (ref 0–2)
BILIRUB DIRECT SERPL-MCNC: <0.1 MG/DL (ref 0–0.2)
BILIRUB SERPL-MCNC: 0.4 MG/DL (ref 0.2–1)
BUN SERPL-MCNC: 20 MG/DL (ref 7–18)
BUN/CREAT SERPL: 15 (ref 12–20)
CALCIUM SERPL-MCNC: 10.1 MG/DL (ref 8.5–10.1)
CHLORIDE SERPL-SCNC: 109 MMOL/L (ref 100–111)
CO2 SERPL-SCNC: 27 MMOL/L (ref 21–32)
CREAT SERPL-MCNC: 1.37 MG/DL (ref 0.6–1.3)
DIFFERENTIAL METHOD BLD: ABNORMAL
EOSINOPHIL # BLD: 0.4 K/UL (ref 0–0.4)
EOSINOPHIL NFR BLD: 8 % (ref 0–5)
ERYTHROCYTE [DISTWIDTH] IN BLOOD BY AUTOMATED COUNT: 13.2 % (ref 11.6–14.5)
GLOBULIN SER CALC-MCNC: 3.2 G/DL (ref 2–4)
GLUCOSE SERPL-MCNC: 104 MG/DL (ref 74–99)
HCT VFR BLD AUTO: 44.2 % (ref 36–48)
HGB BLD-MCNC: 14.3 G/DL (ref 13–16)
IMM GRANULOCYTES # BLD AUTO: 0 K/UL (ref 0–0.04)
IMM GRANULOCYTES NFR BLD AUTO: 0 % (ref 0–0.5)
LYMPHOCYTES # BLD: 2 K/UL (ref 0.9–3.6)
LYMPHOCYTES NFR BLD: 38 % (ref 21–52)
MCH RBC QN AUTO: 31.7 PG (ref 24–34)
MCHC RBC AUTO-ENTMCNC: 32.4 G/DL (ref 31–37)
MCV RBC AUTO: 98 FL (ref 78–100)
MONOCYTES # BLD: 0.4 K/UL (ref 0.05–1.2)
MONOCYTES NFR BLD: 8 % (ref 3–10)
NEUTS SEG # BLD: 2.4 K/UL (ref 1.8–8)
NEUTS SEG NFR BLD: 45 % (ref 40–73)
NRBC # BLD: 0 K/UL (ref 0–0.01)
NRBC BLD-RTO: 0 PER 100 WBC
PLATELET # BLD AUTO: 334 K/UL (ref 135–420)
PMV BLD AUTO: 9.4 FL (ref 9.2–11.8)
POTASSIUM SERPL-SCNC: 4.6 MMOL/L (ref 3.5–5.5)
PROT SERPL-MCNC: 6.7 G/DL (ref 6.4–8.2)
RBC # BLD AUTO: 4.51 M/UL (ref 4.35–5.65)
SODIUM SERPL-SCNC: 139 MMOL/L (ref 136–145)
WBC # BLD AUTO: 5.3 K/UL (ref 4.6–13.2)

## 2023-12-11 PROCEDURE — 80076 HEPATIC FUNCTION PANEL: CPT

## 2023-12-11 PROCEDURE — 85025 COMPLETE CBC W/AUTO DIFF WBC: CPT

## 2023-12-11 PROCEDURE — 36415 COLL VENOUS BLD VENIPUNCTURE: CPT

## 2023-12-11 PROCEDURE — 80048 BASIC METABOLIC PNL TOTAL CA: CPT

## 2024-01-09 ENCOUNTER — OFFICE VISIT (OUTPATIENT)
Age: 79
End: 2024-01-09
Payer: MEDICARE

## 2024-01-09 VITALS
BODY MASS INDEX: 31.36 KG/M2 | HEART RATE: 72 BPM | DIASTOLIC BLOOD PRESSURE: 80 MMHG | HEIGHT: 71 IN | TEMPERATURE: 97 F | OXYGEN SATURATION: 97 % | WEIGHT: 224 LBS | SYSTOLIC BLOOD PRESSURE: 130 MMHG

## 2024-01-09 DIAGNOSIS — M48.062 SPINAL STENOSIS, LUMBAR REGION WITH NEUROGENIC CLAUDICATION: Primary | ICD-10-CM

## 2024-01-09 DIAGNOSIS — H54.8 LEGAL BLINDNESS, AS DEFINED IN USA: ICD-10-CM

## 2024-01-09 DIAGNOSIS — R26.9 GAIT ABNORMALITY: ICD-10-CM

## 2024-01-09 DIAGNOSIS — R26.89 BALANCE PROBLEM: ICD-10-CM

## 2024-01-09 DIAGNOSIS — R53.1 WEAKNESS GENERALIZED: ICD-10-CM

## 2024-01-09 DIAGNOSIS — M47.816 LUMBAR FACET ARTHROPATHY: ICD-10-CM

## 2024-01-09 PROCEDURE — 1036F TOBACCO NON-USER: CPT | Performed by: PHYSICAL MEDICINE & REHABILITATION

## 2024-01-09 PROCEDURE — 99213 OFFICE O/P EST LOW 20 MIN: CPT | Performed by: PHYSICAL MEDICINE & REHABILITATION

## 2024-01-09 PROCEDURE — G8484 FLU IMMUNIZE NO ADMIN: HCPCS | Performed by: PHYSICAL MEDICINE & REHABILITATION

## 2024-01-09 PROCEDURE — G8427 DOCREV CUR MEDS BY ELIG CLIN: HCPCS | Performed by: PHYSICAL MEDICINE & REHABILITATION

## 2024-01-09 PROCEDURE — 1123F ACP DISCUSS/DSCN MKR DOCD: CPT | Performed by: PHYSICAL MEDICINE & REHABILITATION

## 2024-01-09 PROCEDURE — G8417 CALC BMI ABV UP PARAM F/U: HCPCS | Performed by: PHYSICAL MEDICINE & REHABILITATION

## 2024-01-09 NOTE — PROGRESS NOTES
[facility]  [facility] [facility]  [place] [address]  Phone: [phone]  Fax: [phone]    Pt's YOB: 1945    ASSESSMENT   Randy was seen today for lower back pain.    Diagnoses and all orders for this visit:    Spinal stenosis, lumbar region with neurogenic claudication  -     Ambulatory referral to Home Health    Balance problem  -     Ambulatory referral to Home Health    Gait abnormality  -     Ambulatory referral to Home Health    Weakness generalized  -     Ambulatory referral to Home Health    Lumbar facet arthropathy  -     Ambulatory referral to Home Health    Legal blindness, as defined in [place]         IMPRESSION AND PLAN:  Randy Yoo is a 77 y.o.  male with history of chronic lumbar pain and presents to the office today for PT and medication follow up, he is accompanied by his son. Pt continues to complain of chronic lumbar and  thoracic pain with no significant change in his pain since his last office visit. He does state he feels weaker since his previous visit and has more difficulty with every day activities. He states he does a lot of standing, and sitting exercises with little improvement. He notes that he has had difficulty  getting out of bed in the morning. He is taking Cymbalta 30 mg 1 cap daily, uses Voltaren 1% gel and supplements with vitamin D, no refills requested.     1) He is taking Cymbalta 30 mg 1 cap daily, uses Voltaren 1% gel and supplements with vitamin D, no refills requested.   2) Pt is having progressive weakness- will order home PT for strengthening, balance and gait-- concern for increased fall risk and other injury  3) Pt receives his prescriptions through [facility].  4) Mr. Yoo has a reminder for a \"due or due soon\" health maintenance. I have asked that he contact his primary care provider, Luis A Harrell MD, for follow-up on this health maintenance.  5)  demonstrated

## 2024-01-12 ENCOUNTER — HOME HEALTH ADMISSION (OUTPATIENT)
Age: 79
End: 2024-01-12
Payer: MEDICARE

## 2024-01-13 ENCOUNTER — HOME CARE VISIT (OUTPATIENT)
Age: 79
End: 2024-01-13

## 2024-01-13 VITALS
SYSTOLIC BLOOD PRESSURE: 126 MMHG | DIASTOLIC BLOOD PRESSURE: 78 MMHG | TEMPERATURE: 96 F | RESPIRATION RATE: 14 BRPM | HEART RATE: 78 BPM | OXYGEN SATURATION: 92 %

## 2024-01-13 PROCEDURE — 0221000100 HH NO PAY CLAIM PROCEDURE

## 2024-01-13 PROCEDURE — G0151 HHCP-SERV OF PT,EA 15 MIN: HCPCS

## 2024-01-13 ASSESSMENT — ENCOUNTER SYMPTOMS
DYSPNEA ACTIVITY LEVEL: AFTER AMBULATING MORE THAN 20 FT
PAIN LOCATION - PAIN QUALITY: DULL ACHING

## 2024-01-13 NOTE — HOME HEALTH
PMHx: H+P per MD appointment 1/9/24  List of Comorbidities:   Diastolic dysfunction  Left ventricular hypertrophy  Hypogonadism male  Prostate cancer (HCC)  Prostate nodule  Chronic pain  Lumbar spondylosis  Legally blind  Lumbar spinal stenosis  Abdominal pain  Gait abnormality  Weakness of both legs  Abnormal weight loss  Disability of walking  Low back pain  Pigmentary retinal dystrophy  Retinitis pigmentosa  Sensorineural hearing loss, bilateral    SUBJECTIVE: I am doing ok today  LIVING SITUATION: Pt lives with son  in a single level home with 1 step to enter without railings. Pt has private duty aide assistance 3 days a week for 5 hrs   REQUIRES CAREGIVER ASSISTANCE FOR: transportation, medications, ADSl,IADLS   PLOF: Pt was I with amb with LQBC in the home and rollator walker in community with A secondary to decreased B step length . Pt was S with dressing and bathing   MEDICATIONS REVIEWED AND RECONCILED  Medications reconciled. All medications are available in the home this visit. The following education was provided regarding medications, medication interactions, and look alike medications.  Medications are effective at this time.  no severe interactions noted  NEXT MD APPT: Dr. Barton 7/9/24     Dr Harrell PCP TBD   VISION; Pt reports that he is legally blind, He is able to see movement and the color red.  EQUIPMENT: shower chair, LBQC, rollator , wc   ROM:B LE WFL   STRENGTH: B hip flexors, quads, hamstrings DF/PF 5/5   WOUNDS:none Pt has a fatty tumor on his upper back   BED MOBILITY:sit to sidleying the CGA for B LE . Rolling R and L with S incresed time needed to complete task. sidleying to sit with SBA increased time needed to complete task Pt has a very big bed that takes up most of the room. Pt reported that he is looking to get a smaller bed so that he has more room to walk to the bathroom as he currently has a very narrow space. We discussed getting a bed railing to A with bed mobilty. Pt and son

## 2024-01-17 ENCOUNTER — HOME CARE VISIT (OUTPATIENT)
Age: 79
End: 2024-01-17
Payer: MEDICARE

## 2024-01-17 PROCEDURE — G0157 HHC PT ASSISTANT EA 15: HCPCS

## 2024-01-18 VITALS
TEMPERATURE: 98.2 F | OXYGEN SATURATION: 96 % | RESPIRATION RATE: 17 BRPM | SYSTOLIC BLOOD PRESSURE: 150 MMHG | HEART RATE: 75 BPM | DIASTOLIC BLOOD PRESSURE: 70 MMHG

## 2024-01-18 ASSESSMENT — ENCOUNTER SYMPTOMS: PAIN LOCATION - PAIN QUALITY: DULL, SHARP

## 2024-01-19 ENCOUNTER — HOME CARE VISIT (OUTPATIENT)
Age: 79
End: 2024-01-19
Payer: MEDICARE

## 2024-01-19 VITALS
TEMPERATURE: 99.4 F | RESPIRATION RATE: 17 BRPM | OXYGEN SATURATION: 92 % | DIASTOLIC BLOOD PRESSURE: 80 MMHG | SYSTOLIC BLOOD PRESSURE: 120 MMHG | HEART RATE: 77 BPM

## 2024-01-19 PROCEDURE — G0157 HHC PT ASSISTANT EA 15: HCPCS

## 2024-01-19 ASSESSMENT — ENCOUNTER SYMPTOMS: PAIN LOCATION - PAIN QUALITY: ACHING

## 2024-01-19 NOTE — HOME HEALTH
SUBJECTIVE: \"My pain is about a 5/10. I don't take a whole lot of pain medication because it's bad on the liver and the kidneys. I use the Voltaren hyacinth the pain is probably about a 7. I use the patch which works pretty good, but it doesn't stay on.\"    CAREGIVER INVOLVEMENT/ASSISTANCE NEEDED FOR: Pt's son lives in the home and assists w/ meals, medications, and transportation needs.  Pt has a PCA 3days/week, 5 hrs/day - She assists w/ meals, laundry needs, and household chores, and bathing and dressing needs, assists w/ medications.    MD appt: 1/23/204 Dr. Martinez - Urologist @ 11:30am.  .  OBJECTIVE:  See interventions.    PATIENT EDUCATION PROVIDED THIS VISIT: Try using medical supply tape to keep lidocaine patch on back for full 12 hrs. Discussed positions of comfort lying in bed. Fall Prevention (see interventions.)    PATIENT RESPONSE TO EDUCATION PROVIDED: Pt verbalized understanding, and able to verbalized placement of pillow between, under and on side of knees and abdomen for comfort- reported he is a side sleeper with a pillow between knees.     PATIENT RESPONSE TO TREATMENT:   Good recall and demonstration of standing there ex, and bed mobility log roll technique this visit. No increased fatigue reported w/ LE functional transfers from the edge of bed.   .  ASSESSMENT OF PROGRESS TOWARD GOALS:   Pt has macular degeneration which impairs his gait safety and mobility, however is able to navigate his home w/ LBQC. He is utilizing his Rollator when out in community, and requires assistance w/ transportation and managing AD in/out of vehicle. Pt is managing his back pain w/ prescribed medication, and use of ice/heat pad, and lidocaine patches. No falls have been reported, and he demonstrates good understanding of HEP, and bed mobility (goal met), this visit.  He would benefit from continued skilled interventions to improve his functional strength, balance, gait safety outdoors on uneven surfaces for upcoming MD

## 2024-01-20 NOTE — HOME HEALTH
SUBJECTIVE: \"I don't have any pain today. I went out to the dentist and took my cane. I wonder why my left side was hurting. It doesn't do it all the time. It's usually when I get up.\"    CAREGIVER INVOLVEMENT/ASSISTANCE NEEDED FOR: Pt's son lives in the home and assists w/ meals, medications, and transportation needs.  Pt has a PCA 3 days/week, 5 hrs/day - She assists w/ meals, laundry needs, and household chores, and bathing and dressing needs, assists w/ medications.     MD appt: 1/29/204 Dr. Martinez - Urologist @ 11:30am.      OBJECTIVE:  See interventions.       PATIENT EDUCATION PROVIDED THIS VISIT: Continue HEP over the weekend, and use cane in the house at all times. Take rest breaks and do not overdo it for the rest of the day.      PATIENT RESPONSE TO EDUCATION PROVIDED: Pt verbalized understanding.       PATIENT RESPONSE TO TREATMENT:   Pt presents today appearing fatigued from going out to MD appts, and w/ increased instability d/t fatigue despite reporting not feeling fatigued.       ASSESSMENT OF PROGRESS TOWARD GOALS:   Pt progressing towards Tinetti Balance/gait assessment today scoring 25/28 indicating low fall risk. He requires use of an AD all times, and recommending use of Rollator when going to MD appts for energy conservation, and fall prevention when fatigued.  Pt has reported compliance w/ HEP, and will be progressed as able. Pain has been well controlled w/ rest. Skilled interventions to improve his functional strength, balance, gait safety outdoors on uneven surfaces for upcoming MD appts, and future community mobility.   .  PLAN FOR NEXT VISIT: Progress standing there ex reps, and gait training outdoors weather permitted.       THE FOLLOWING DISCHARGE PLANNING WAS DISCUSSED WITH THE PATIENT/CAREGIVER: HHPT frequency 1w1, 2w3 w/ planned DC on 2/1 to self, family, and under MD supervision when goals met, or max benefits achieved. - pt verbalized understanding.

## 2024-01-22 ENCOUNTER — HOME CARE VISIT (OUTPATIENT)
Age: 79
End: 2024-01-22
Payer: MEDICARE

## 2024-01-22 PROCEDURE — G0157 HHC PT ASSISTANT EA 15: HCPCS

## 2024-01-23 VITALS
DIASTOLIC BLOOD PRESSURE: 70 MMHG | SYSTOLIC BLOOD PRESSURE: 130 MMHG | OXYGEN SATURATION: 97 % | RESPIRATION RATE: 17 BRPM | TEMPERATURE: 98.3 F | HEART RATE: 86 BPM

## 2024-01-23 ASSESSMENT — ENCOUNTER SYMPTOMS: PAIN LOCATION - PAIN QUALITY: ACHING

## 2024-01-23 NOTE — HOME HEALTH
w/ planned DC on 2/1 to self, family, and under MD supervision when goals met, or max benefits achieved. - pt verbalized understanding.

## 2024-01-24 ENCOUNTER — HOME CARE VISIT (OUTPATIENT)
Age: 79
End: 2024-01-24
Payer: MEDICARE

## 2024-01-24 PROCEDURE — G0157 HHC PT ASSISTANT EA 15: HCPCS

## 2024-01-25 VITALS
HEART RATE: 78 BPM | TEMPERATURE: 98.6 F | RESPIRATION RATE: 18 BRPM | DIASTOLIC BLOOD PRESSURE: 74 MMHG | SYSTOLIC BLOOD PRESSURE: 136 MMHG | OXYGEN SATURATION: 97 %

## 2024-01-25 ASSESSMENT — ENCOUNTER SYMPTOMS: PAIN LOCATION - PAIN QUALITY: ACHING

## 2024-01-25 NOTE — HOME HEALTH
SUBJECTIVE: \"I feel fine. My lower back has a little bit of pain. When I walked the other day, it kind of got me there. I only used the cane to get out of the car, and the Rollator to get to the office. It's about a 5 right.\"       CAREGIVER INVOLVEMENT/ASSISTANCE NEEDED FOR: Pt's son lives in the home and assists w/ meals, medications, and transportation needs. Pt has a PCA 3 days/week, 5 hrs/day - He assists w/ meals, laundry needs, and household chores, and bathing and dressing needs, assists w/ medications.     MD appt: 1/29/204 Dr. Martinez - Urologist @ 11:30am.       OBJECTIVE:  See interventions.       PATIENT EDUCATION PROVIDED THIS VISIT: Discussed discharge planning for next week. Take Rollator when going out for MD appts to allow for rest breaks and energy conservation, prevent pain.  NOMNC reviewed and signed today in preparation for DC next week. Fall Prevention (see interventions)      PATIENT RESPONSE TO EDUCATION PROVIDED: Pt verbalized understanding, and agreeable to DC.       PATIENT RESPONSE TO TREATMENT:   Pt begins to drag BLE when fatigued with increased gait distance requiring two seated rest breaks when ambulating outdoors. Vitals remained w/I normal parameters w/ pt reported RPE (Rate of Perceived Exertion) at 3/10 light activity.        ASSESSMENT OF PROGRESS TOWARD GOALS:   Pt continues to progress well towards functional goals evidenced by Tinetti Balance/gait Assessment score of 24/28 indicating medium fall risk. He continues to require use of an AD, and staggers with 360 degree turn. He demonstrates good spatial awareness despite visual impairment in the home, and navigating the inclined driveway (I) using Rollator. Ambulation goals are met at this time > 75 ft. No falls have been reported. This allows pt to safely leave the home for MD appts, however w/ assistance w/ transportation needs. Pt has two visits remaining w/ HHPT, however pain is managed w/ seated rest, repositioning, w/ pt

## 2024-01-31 ENCOUNTER — HOME CARE VISIT (OUTPATIENT)
Age: 79
End: 2024-01-31
Payer: MEDICARE

## 2024-01-31 VITALS
RESPIRATION RATE: 16 BRPM | HEART RATE: 80 BPM | OXYGEN SATURATION: 94 % | DIASTOLIC BLOOD PRESSURE: 74 MMHG | SYSTOLIC BLOOD PRESSURE: 130 MMHG | TEMPERATURE: 97 F

## 2024-01-31 PROCEDURE — G0151 HHCP-SERV OF PT,EA 15 MIN: HCPCS

## 2024-01-31 ASSESSMENT — ENCOUNTER SYMPTOMS
DYSPNEA ACTIVITY LEVEL: AFTER AMBULATING MORE THAN 20 FT
PAIN LOCATION - PAIN QUALITY: DULL ACHING

## 2024-01-31 NOTE — HOME HEALTH
SUBJECTIVE: I am doing ok today  REQUIRES CAREGIVER ASSISTANCE FOR: transportation, IADLS   MEDICATIONS REVIEWED AND RECONCILED no changes   NEXT MD APPT: , 7/9/24  Dr yung [birth year]  ROM:B LE WFL   STRENGTH:  B hip flexors, quads, hamstrings DF/PF 5/5   WOUNDS:none   BED MOBILITY:supine<>sit MOD I  TRANSFERS: sit to stand from couch x 3 with and without B UE support for push off MOD I. sit to stand from bed with B UE support for push off MOD I. Per LPTA visit 1/24/24sit <> stand from couch, rollator, and kitchen chair mod (I)  GAIT: Pt amb 100 ft on flat level surfaces with a LBQC with recipical gait pattern with SBA pt does reach out to touch objects when amb for orientation secondary to decrased vision. Pt b feet did clear the floor during swing phase of gait and increased B step length noted.  Per LPTA visit 1/29/24 Pt amb indoors for 100ft w/ SBA using LBQC w/ SBA. Pt uses walls and furniture for balance and for navigating through hallways.  STAIRS:Per LPTA visit 1/24/24 Pt negotiated 1 small step to egress/ingress the home w/ min A to manage Rollator for safety. Pt reported, \"My son usually helps me get the walker in and out the house.\"   BALANCE: Pt scored 21/28 on Tinetti Balance Assessment placing pt at med risk for falls.   PATIENT RESPONE TO TX: Pt pain level remained the same throughout tx session   PATIENT LEVEL OF UNDERSTANDING OF EDUCATION PROVIDED Cont to use Quad cane in the home and Rollator walker in the community  PATIENT EDUCATION PROVIDED THIS VISIT: safety, HEP, walking, deep breathing,         HEP consisting of:  LAQs, pillow squeezes 10x5 seconds, hip abduction w/ heavy resistance band - pt completed 1 set 10-20xea.  Standing: heel/toe raises, hip abduction, extension, flexion, marching, hamstring curls, sit <> stand. Pt completed 1 set 15xea, lateral stepping on counter top 3.   Verbally reviewed supine: straight leg raises, trunk rotations, abdominal bracing w/ shoulder extensions,

## 2024-04-11 ENCOUNTER — TELEPHONE (OUTPATIENT)
Age: 79
End: 2024-04-11

## 2024-07-10 NOTE — PROGRESS NOTES
[facility]  [address] [place] [address]., [address]  [place] [address]  Phone: [phone]  Fax: [phone]    Pt's YOB: 1945    ASSESSMENT   Randy was seen today for lower back pain and foot pain.    Diagnoses and all orders for this visit:    Neuritis  -     gabapentin (NEURONTIN) 100 MG capsule; Take 1-2 capsules in the evening for neuropathic symptoms    Balance problem    Lumbar facet arthropathy    Gait abnormality    Legal blindness, as defined in [place]         IMPRESSION AND PLAN:  Randy Yoo is an 78 y.o.  male with history of chronic lumbar pain. He is taking Cymbalta 30 mg 1 cap daily and Topamax 100 mg 1 tab QAM and 2 tabs QHS prescribed by Dr. Goldberg. Patient also uses Voltaren 1% gel and supplements with vitamin D. He is being followed by urology for established prostate cancer (initially diagnosed in 2013).     1) Pt advised to practice going from sit-to-stand with his walker at home.  2) Patient will trial gabapentin 100 mg, 1 po QAH x 2 weeks for the twitching of his feet. He was advised against simultaneous use of the pregabalin 25 mg.  3) Mr. Yoo has a reminder for a \"due or due soon\" health maintenance. I have asked that he contact his primary care provider, Luis A Harrell MD, for follow-up on this health maintenance.  4)  demonstrated consistency with prescribing.     Return in about 6 months (around 1/15/2025) for Medication follow up.        HISTORY OF PRESENT ILLNESS:  Randy Yoo is a 78 y.o. male with history of chronic lumbar pain and presents to the office today for PT and medication follow up.   At the time of his last OV on 1/9/2024, the patient was advised to continue Voltaren 1% gel, Vit D supplementation and Cymbalta 30 mg QD. Home PT was ordered at that time as well.  Today, the patient presents accompanied by his son. Notes he completed a course of home therapy with benefit. Patient endorses a daily

## 2024-07-15 ENCOUNTER — OFFICE VISIT (OUTPATIENT)
Age: 79
End: 2024-07-15
Payer: MEDICARE

## 2024-07-15 VITALS
BODY MASS INDEX: 31.24 KG/M2 | HEIGHT: 71 IN | HEART RATE: 84 BPM | RESPIRATION RATE: 16 BRPM | DIASTOLIC BLOOD PRESSURE: 70 MMHG | SYSTOLIC BLOOD PRESSURE: 113 MMHG

## 2024-07-15 DIAGNOSIS — M79.2 NEURITIS: Primary | ICD-10-CM

## 2024-07-15 DIAGNOSIS — M47.816 LUMBAR FACET ARTHROPATHY: ICD-10-CM

## 2024-07-15 DIAGNOSIS — R26.89 BALANCE PROBLEM: ICD-10-CM

## 2024-07-15 DIAGNOSIS — H54.8 LEGAL BLINDNESS, AS DEFINED IN USA: ICD-10-CM

## 2024-07-15 DIAGNOSIS — R26.9 GAIT ABNORMALITY: ICD-10-CM

## 2024-07-15 PROBLEM — Z71.89 OTHER SPECIFIED COUNSELING: Status: ACTIVE | Noted: 2024-07-15

## 2024-07-15 PROBLEM — F41.0 PANIC DISORDER: Status: ACTIVE | Noted: 2024-07-15

## 2024-07-15 PROBLEM — H90.3 SENSORINEURAL HEARING LOSS (SNHL) OF BOTH EARS: Status: ACTIVE | Noted: 2024-07-15

## 2024-07-15 PROBLEM — M10.9 GOUT: Status: ACTIVE | Noted: 2024-07-15

## 2024-07-15 PROBLEM — D12.6 BENIGN NEOPLASM OF COLON: Status: ACTIVE | Noted: 2024-07-15

## 2024-07-15 PROBLEM — E04.1 NONTOXIC SINGLE THYROID NODULE: Status: ACTIVE | Noted: 2024-07-15

## 2024-07-15 PROBLEM — I10 PRIMARY HYPERTENSION: Status: ACTIVE | Noted: 2024-07-15

## 2024-07-15 PROBLEM — E66.9 OBESITY: Status: ACTIVE | Noted: 2024-07-15

## 2024-07-15 PROBLEM — J61 ASBESTOSIS (HCC): Status: ACTIVE | Noted: 2024-07-15

## 2024-07-15 PROBLEM — J30.9 ALLERGIC RHINITIS: Status: ACTIVE | Noted: 2024-07-15

## 2024-07-15 PROBLEM — R76.11 NONSPECIFIC REACTION TO TUBERCULIN SKIN TEST: Status: ACTIVE | Noted: 2024-07-15

## 2024-07-15 PROBLEM — G56.00 CARPAL TUNNEL SYNDROME: Status: ACTIVE | Noted: 2024-07-15

## 2024-07-15 PROBLEM — H35.89 OTHER SPECIFIED RETINAL DISORDERS: Status: ACTIVE | Noted: 2024-07-15

## 2024-07-15 PROBLEM — H91.90 HEARING LOSS: Status: ACTIVE | Noted: 2024-07-15

## 2024-07-15 PROCEDURE — 3074F SYST BP LT 130 MM HG: CPT | Performed by: PHYSICAL MEDICINE & REHABILITATION

## 2024-07-15 PROCEDURE — 99214 OFFICE O/P EST MOD 30 MIN: CPT | Performed by: PHYSICAL MEDICINE & REHABILITATION

## 2024-07-15 PROCEDURE — G8417 CALC BMI ABV UP PARAM F/U: HCPCS | Performed by: PHYSICAL MEDICINE & REHABILITATION

## 2024-07-15 PROCEDURE — 1036F TOBACCO NON-USER: CPT | Performed by: PHYSICAL MEDICINE & REHABILITATION

## 2024-07-15 PROCEDURE — 3078F DIAST BP <80 MM HG: CPT | Performed by: PHYSICAL MEDICINE & REHABILITATION

## 2024-07-15 PROCEDURE — G8427 DOCREV CUR MEDS BY ELIG CLIN: HCPCS | Performed by: PHYSICAL MEDICINE & REHABILITATION

## 2024-07-15 PROCEDURE — 1123F ACP DISCUSS/DSCN MKR DOCD: CPT | Performed by: PHYSICAL MEDICINE & REHABILITATION

## 2024-07-15 RX ORDER — LORATADINE 10 MG/1
TABLET ORAL
COMMUNITY
Start: 2024-05-24

## 2024-07-15 RX ORDER — BACLOFEN 10 MG/1
TABLET ORAL
COMMUNITY
Start: 2024-02-06 | End: 2025-02-04

## 2024-07-15 RX ORDER — GABAPENTIN 100 MG/1
CAPSULE ORAL
Qty: 60 CAPSULE | Refills: 4 | Status: SHIPPED | OUTPATIENT
Start: 2024-07-15 | End: 2024-12-13

## 2024-09-14 NOTE — LETTER
1/15/20 Patient: Clint Gruber YOB: 1945 Date of Visit: 1/15/2020 Alejandra Cherry MD 
[address] [facility] [address] [facility] [address] VIA Facsimile: [phone] Dear Alejandra Cherry MD, Thank you for referring Mr. Ellis Morin to [address] [place] [address] for evaluation. My notes for this consultation are attached. If you have questions, please do not hesitate to call me. I look forward to following your patient along with you. Sincerely, Neville Suarez MD 
 
 assistive devices/Impaired gait/Other

## 2024-09-27 ENCOUNTER — OFFICE VISIT (OUTPATIENT)
Age: 79
End: 2024-09-27
Payer: MEDICARE

## 2024-09-27 VITALS
DIASTOLIC BLOOD PRESSURE: 70 MMHG | HEIGHT: 69 IN | HEART RATE: 82 BPM | TEMPERATURE: 97.5 F | WEIGHT: 226 LBS | SYSTOLIC BLOOD PRESSURE: 129 MMHG | BODY MASS INDEX: 33.47 KG/M2 | RESPIRATION RATE: 18 BRPM | OXYGEN SATURATION: 96 %

## 2024-09-27 DIAGNOSIS — I51.7 LEFT VENTRICULAR HYPERTROPHY: ICD-10-CM

## 2024-09-27 DIAGNOSIS — G47.23 CIRCADIAN RHYTHM SLEEP DISORDER, IRREGULAR SLEEP WAKE TYPE: Primary | ICD-10-CM

## 2024-09-27 DIAGNOSIS — H54.8 LEGALLY BLIND: ICD-10-CM

## 2024-09-27 DIAGNOSIS — G89.29 OTHER CHRONIC PAIN: ICD-10-CM

## 2024-09-27 DIAGNOSIS — H90.3 SENSORINEURAL HEARING LOSS, BILATERAL: ICD-10-CM

## 2024-09-27 DIAGNOSIS — H35.52 RETINITIS PIGMENTOSA: ICD-10-CM

## 2024-09-27 DIAGNOSIS — R06.83 SNORING: ICD-10-CM

## 2024-09-27 DIAGNOSIS — R35.1 NOCTURIA: ICD-10-CM

## 2024-09-27 DIAGNOSIS — I10 PRIMARY HYPERTENSION: ICD-10-CM

## 2024-09-27 PROCEDURE — 3078F DIAST BP <80 MM HG: CPT | Performed by: OTOLARYNGOLOGY

## 2024-09-27 PROCEDURE — 99204 OFFICE O/P NEW MOD 45 MIN: CPT | Performed by: OTOLARYNGOLOGY

## 2024-09-27 PROCEDURE — 1036F TOBACCO NON-USER: CPT | Performed by: OTOLARYNGOLOGY

## 2024-09-27 PROCEDURE — G8427 DOCREV CUR MEDS BY ELIG CLIN: HCPCS | Performed by: OTOLARYNGOLOGY

## 2024-09-27 PROCEDURE — G8417 CALC BMI ABV UP PARAM F/U: HCPCS | Performed by: OTOLARYNGOLOGY

## 2024-09-27 PROCEDURE — 3074F SYST BP LT 130 MM HG: CPT | Performed by: OTOLARYNGOLOGY

## 2024-09-27 PROCEDURE — 1123F ACP DISCUSS/DSCN MKR DOCD: CPT | Performed by: OTOLARYNGOLOGY

## 2024-09-27 RX ORDER — MELATONIN 200 MCG
3 TABLET ORAL DAILY
Qty: 30 TABLET | Refills: 1 | Status: SHIPPED | OUTPATIENT
Start: 2024-09-27

## 2024-09-27 ASSESSMENT — SLEEP AND FATIGUE QUESTIONNAIRES
ESS TOTAL SCORE: 3
HOW LIKELY ARE YOU TO NOD OFF OR FALL ASLEEP WHEN YOU ARE A PASSENGER IN A CAR FOR AN HOUR WITHOUT A BREAK: WOULD NEVER DOZE
HOW LIKELY ARE YOU TO NOD OFF OR FALL ASLEEP WHILE SITTING AND READING: WOULD NEVER DOZE
HOW LIKELY ARE YOU TO NOD OFF OR FALL ASLEEP WHILE WATCHING TV: WOULD NEVER DOZE
HOW LIKELY ARE YOU TO NOD OFF OR FALL ASLEEP WHILE SITTING AND TALKING TO SOMEONE: WOULD NEVER DOZE
HOW LIKELY ARE YOU TO NOD OFF OR FALL ASLEEP WHILE SITTING QUIETLY AFTER LUNCH WITHOUT ALCOHOL: WOULD NEVER DOZE
HOW LIKELY ARE YOU TO NOD OFF OR FALL ASLEEP IN A CAR, WHILE STOPPED FOR A FEW MINUTES IN TRAFFIC: WOULD NEVER DOZE
HOW LIKELY ARE YOU TO NOD OFF OR FALL ASLEEP WHILE LYING DOWN TO REST IN THE AFTERNOON WHEN CIRCUMSTANCES PERMIT: HIGH CHANCE OF DOZING
HOW LIKELY ARE YOU TO NOD OFF OR FALL ASLEEP WHILE SITTING INACTIVE IN A PUBLIC PLACE: WOULD NEVER DOZE

## 2024-09-27 ASSESSMENT — PATIENT HEALTH QUESTIONNAIRE - PHQ9
SUM OF ALL RESPONSES TO PHQ QUESTIONS 1-9: 0
SUM OF ALL RESPONSES TO PHQ QUESTIONS 1-9: 0
SUM OF ALL RESPONSES TO PHQ9 QUESTIONS 1 & 2: 0
2. FEELING DOWN, DEPRESSED OR HOPELESS: NOT AT ALL
1. LITTLE INTEREST OR PLEASURE IN DOING THINGS: NOT AT ALL
SUM OF ALL RESPONSES TO PHQ QUESTIONS 1-9: 0
SUM OF ALL RESPONSES TO PHQ QUESTIONS 1-9: 0

## 2024-09-27 ASSESSMENT — ENCOUNTER SYMPTOMS: BACK PAIN: 1

## 2025-02-03 NOTE — PROGRESS NOTES
[facility]  [address] [place] [address]., [address]  [place] [address]  Phone: [phone]  Fax: [phone]    Patient's YOB: 1945    ASSESSMENT   Randy was seen today for back pain.    Diagnoses and all orders for this visit:    Neuritis    Chronic pain of both knees  -     diclofenac sodium (VOLTAREN) 1 % GEL; Apply topically 4 times daily Apply to knees qid as needed for pain    Lumbar facet arthropathy    Balance problem    Gait abnormality    Other symptoms and signs involving the musculoskeletal system    Spinal stenosis, lumbar region with neurogenic claudication    Legal blindness, as defined in [place]         IMPRESSION AND PLAN:  Randy Yoo is a 79 y.o. male with history of chronic lumbar pain. Since last visit, pt feels his symptoms have been manageable. Patient utilized Voltaren 1% Gel with benefit. Patient utilizes Vitamin D supplements. He is being followed by urology for established prostate cancer (initially diagnosed in 2013).      Patient was given information on muscle conditioning exercises.   Ordered Voltaren 1% Gel for knee pain.   Discontinued Gabapentin - lack of benefit.   Vit D3 and Zinc supplementation recommended.   Recommended patient return with a medication list at next office visit for assessment.   Mr. Yoo has a reminder for a \"due or due soon\" health maintenance. I have asked that he contact his primary care provider, Luis A Harrell MD, for follow-up on this health maintenance.   demonstrated consistency with prescribing.     Return in about 6 months (around 8/5/2025) for follow up.      HISTORY OF PRESENT ILLNESS:  Randy Yoo is a 79 y.o. male who presents to the office today for a medicatioin follow up. At his last OV (7/15/24), Ordered Gabapentin 100mg .    Pt presents here for medication follow-up. Pt states that he stopped taking Gabapentin and Topamax d/t lack of relief. Pt reports well-controlled BP on

## 2025-02-05 ENCOUNTER — OFFICE VISIT (OUTPATIENT)
Age: 80
End: 2025-02-05
Payer: MEDICARE

## 2025-02-05 VITALS
HEART RATE: 85 BPM | BODY MASS INDEX: 32.88 KG/M2 | HEIGHT: 69 IN | DIASTOLIC BLOOD PRESSURE: 77 MMHG | SYSTOLIC BLOOD PRESSURE: 136 MMHG | OXYGEN SATURATION: 99 % | TEMPERATURE: 97.8 F | WEIGHT: 222 LBS

## 2025-02-05 DIAGNOSIS — M47.816 LUMBAR FACET ARTHROPATHY: ICD-10-CM

## 2025-02-05 DIAGNOSIS — M25.561 CHRONIC PAIN OF BOTH KNEES: ICD-10-CM

## 2025-02-05 DIAGNOSIS — G89.29 CHRONIC PAIN OF BOTH KNEES: ICD-10-CM

## 2025-02-05 DIAGNOSIS — M79.2 NEURITIS: Primary | ICD-10-CM

## 2025-02-05 DIAGNOSIS — M25.562 CHRONIC PAIN OF BOTH KNEES: ICD-10-CM

## 2025-02-05 DIAGNOSIS — R26.89 BALANCE PROBLEM: ICD-10-CM

## 2025-02-05 DIAGNOSIS — R29.898 OTHER SYMPTOMS AND SIGNS INVOLVING THE MUSCULOSKELETAL SYSTEM: ICD-10-CM

## 2025-02-05 DIAGNOSIS — H54.8 LEGAL BLINDNESS, AS DEFINED IN USA: ICD-10-CM

## 2025-02-05 DIAGNOSIS — M48.062 SPINAL STENOSIS, LUMBAR REGION WITH NEUROGENIC CLAUDICATION: ICD-10-CM

## 2025-02-05 DIAGNOSIS — R26.9 GAIT ABNORMALITY: ICD-10-CM

## 2025-02-05 PROCEDURE — 3078F DIAST BP <80 MM HG: CPT | Performed by: PHYSICAL MEDICINE & REHABILITATION

## 2025-02-05 PROCEDURE — 1125F AMNT PAIN NOTED PAIN PRSNT: CPT | Performed by: PHYSICAL MEDICINE & REHABILITATION

## 2025-02-05 PROCEDURE — 1160F RVW MEDS BY RX/DR IN RCRD: CPT | Performed by: PHYSICAL MEDICINE & REHABILITATION

## 2025-02-05 PROCEDURE — G8417 CALC BMI ABV UP PARAM F/U: HCPCS | Performed by: PHYSICAL MEDICINE & REHABILITATION

## 2025-02-05 PROCEDURE — G8427 DOCREV CUR MEDS BY ELIG CLIN: HCPCS | Performed by: PHYSICAL MEDICINE & REHABILITATION

## 2025-02-05 PROCEDURE — 1123F ACP DISCUSS/DSCN MKR DOCD: CPT | Performed by: PHYSICAL MEDICINE & REHABILITATION

## 2025-02-05 PROCEDURE — 99213 OFFICE O/P EST LOW 20 MIN: CPT | Performed by: PHYSICAL MEDICINE & REHABILITATION

## 2025-02-05 PROCEDURE — 1159F MED LIST DOCD IN RCRD: CPT | Performed by: PHYSICAL MEDICINE & REHABILITATION

## 2025-02-05 PROCEDURE — 3075F SYST BP GE 130 - 139MM HG: CPT | Performed by: PHYSICAL MEDICINE & REHABILITATION

## 2025-02-05 PROCEDURE — 1036F TOBACCO NON-USER: CPT | Performed by: PHYSICAL MEDICINE & REHABILITATION

## 2025-02-05 NOTE — PROGRESS NOTES
[place] SEBASTIAN Fredo presents today for   Chief Complaint   Patient presents with    Back Pain     Back pain is getting worse since last visit  C/o popping        Is someone accompanying this pt? Yes, Son    Is the patient using any DME equipment during OV? Yes, rolling walker          Coordination of Care:  1. Have you been to the ER, urgent care clinic since your last visit? no  Hospitalized since your last visit? no    2. Have you seen or consulted any other health care providers outside of the [facility] since your last visit? no Include any pap smears or colon screening. no

## 2025-04-16 PROBLEM — B00.9 HERPES SIMPLEX: Status: ACTIVE | Noted: 2024-08-11

## 2025-07-28 ENCOUNTER — TRANSCRIBE ORDERS (OUTPATIENT)
Facility: HOSPITAL | Age: 80
End: 2025-07-28

## 2025-07-28 ENCOUNTER — HOSPITAL ENCOUNTER (OUTPATIENT)
Facility: HOSPITAL | Age: 80
Discharge: HOME | End: 2025-07-31
Payer: MEDICARE

## 2025-07-28 DIAGNOSIS — D35.01 BILATERAL ADRENAL ADENOMAS: Primary | ICD-10-CM

## 2025-07-28 DIAGNOSIS — D35.01 BILATERAL ADRENAL ADENOMAS: ICD-10-CM

## 2025-07-28 DIAGNOSIS — E26.09 PRIMARY HYPERALDOSTERONISM: ICD-10-CM

## 2025-07-28 DIAGNOSIS — D35.02 BILATERAL ADRENAL ADENOMAS: Primary | ICD-10-CM

## 2025-07-28 DIAGNOSIS — D35.02 BILATERAL ADRENAL ADENOMAS: ICD-10-CM

## 2025-07-28 LAB
ALBUMIN SERPL-MCNC: 3.5 G/DL (ref 3.4–5)
ALBUMIN/GLOB SERPL: 1.2 (ref 0.8–1.7)
ALP SERPL-CCNC: 80 U/L (ref 45–117)
ALT SERPL-CCNC: 14 U/L (ref 10–50)
ANION GAP SERPL CALC-SCNC: 13 MMOL/L (ref 3–18)
AST SERPL-CCNC: 13 U/L (ref 10–38)
BILIRUB SERPL-MCNC: 0.5 MG/DL (ref 0.2–1)
BUN SERPL-MCNC: 17 MG/DL (ref 6–23)
BUN/CREAT SERPL: 13 (ref 12–20)
CALCIUM SERPL-MCNC: 10.7 MG/DL (ref 8.5–10.1)
CHLORIDE SERPL-SCNC: 104 MMOL/L (ref 98–107)
CO2 SERPL-SCNC: 25 MMOL/L (ref 21–32)
CREAT SERPL-MCNC: 1.37 MG/DL (ref 0.6–1.3)
GLOBULIN SER CALC-MCNC: 2.9 G/DL (ref 2–4)
GLUCOSE SERPL-MCNC: 122 MG/DL (ref 74–108)
POTASSIUM SERPL-SCNC: 4.4 MMOL/L (ref 3.5–5.5)
PROT SERPL-MCNC: 6.4 G/DL (ref 6.4–8.2)
SODIUM SERPL-SCNC: 141 MMOL/L (ref 136–145)

## 2025-07-28 PROCEDURE — 84244 ASSAY OF RENIN: CPT

## 2025-07-28 PROCEDURE — 82088 ASSAY OF ALDOSTERONE: CPT

## 2025-07-28 PROCEDURE — 82024 ASSAY OF ACTH: CPT

## 2025-07-28 PROCEDURE — 80053 COMPREHEN METABOLIC PANEL: CPT

## 2025-07-28 PROCEDURE — 82627 DEHYDROEPIANDROSTERONE: CPT

## 2025-07-28 PROCEDURE — 36415 COLL VENOUS BLD VENIPUNCTURE: CPT

## 2025-07-28 PROCEDURE — 82533 TOTAL CORTISOL: CPT

## 2025-07-29 LAB
ACTH PLAS-MCNC: 4.3 PG/ML (ref 7.2–63.3)
CORTIS AM PEAK SERPL-MCNC: 10.2 UG/DL (ref 4.3–22.45)
DHEA-S SERPL-MCNC: 117 UG/DL (ref 20.8–226.4)

## 2025-07-31 LAB — ALDOST SERPL-MCNC: 17.5 NG/DL (ref 0–30)

## 2025-08-02 LAB — RENIN PLAS-CCNC: 0.65 NG/ML/HR (ref 0.17–5.38)

## (undated) DEVICE — SOLUTION IRRIG 1000ML H2O STRL BLT

## (undated) DEVICE — ENDOSCOPY PUMP TUBING/ CAP SET: Brand: ERBE

## (undated) DEVICE — LINER SUCT CANSTR 3000CC PLAS SFT PRE ASSEMB W/OUT TBNG W/

## (undated) DEVICE — GAUZE,SPONGE,4"X4",16PLY,STRL,LF,10/TRAY: Brand: MEDLINE

## (undated) DEVICE — FLUFF AND POLYMER UNDERPAD,EXTRA HEAVY: Brand: WINGS

## (undated) DEVICE — CATHETER SUCT TR FL TIP 14FR W/ O CTRL

## (undated) DEVICE — SYR 50ML SLIP TIP NSAF LF STRL --

## (undated) DEVICE — SYRINGE MED 25GA 3ML L5/8IN SUBQ PLAS W/ DETACH NDL SFTY

## (undated) DEVICE — TRAP SPEC COLL POLYP POLYSTYR --

## (undated) DEVICE — SYR 10ML LUER LOK 1/5ML GRAD --

## (undated) DEVICE — GOWN ISOL IMPERV UNIV, DISP, OPEN BACK, BLUE --

## (undated) DEVICE — SNARE POLYP M W27MMXL240CM OVL STIFF DISP CAPTIVATOR

## (undated) DEVICE — CANNULA ORIG TL CLR W FOAM CUSHIONS AND 14FT SUPL TB 3 CHN

## (undated) DEVICE — MEDI-VAC SUCTION HIGH CAPACITY: Brand: CARDINAL HEALTH

## (undated) DEVICE — MEDI-VAC NON-CONDUCTIVE SUCTION TUBING: Brand: CARDINAL HEALTH

## (undated) DEVICE — SYR 20ML LL STRL LF --

## (undated) DEVICE — 3M™ UNIVERSAL ELECTROSURGICAL PLATE, SPLIT, UNCORDED 9160: Brand: 3M™

## (undated) DEVICE — SNARE ENDOSCP POLYP 2.4 MM 240 CM 10 MM 2.8 MM CAPTIVATOR

## (undated) DEVICE — FLEX ADVANTAGE 3000CC: Brand: FLEX ADVANTAGE

## (undated) DEVICE — YANKAUER,SMOOTH HANDLE,HIGH CAPACITY: Brand: MEDLINE INDUSTRIES, INC.

## (undated) DEVICE — AIRLIFE™ NASAL OXYGEN CANNULA CURVED, NONFLARED TIP WITH 14 FOOT (4.3 M) CRUSH-RESISTANT TUBING, OVER-THE-EAR STYLE: Brand: AIRLIFE™

## (undated) DEVICE — SNARE VASC L240CM LOOP W10MM SHTH DIA2.4MM RND STIFF CLD

## (undated) DEVICE — FORCEPS BX L240CM JAW DIA2.8MM L CAP W/ NDL MIC MESH TOOTH

## (undated) DEVICE — CANNULA NSL AD TBNG L14FT STD PVC O2 CRV CONN NONFLARED NSL